# Patient Record
Sex: MALE | Race: BLACK OR AFRICAN AMERICAN | NOT HISPANIC OR LATINO | Employment: FULL TIME | URBAN - NONMETROPOLITAN AREA
[De-identification: names, ages, dates, MRNs, and addresses within clinical notes are randomized per-mention and may not be internally consistent; named-entity substitution may affect disease eponyms.]

---

## 2018-08-11 ENCOUNTER — APPOINTMENT (OUTPATIENT)
Dept: CT IMAGING | Facility: HOSPITAL | Age: 28
End: 2018-08-11

## 2018-08-11 ENCOUNTER — HOSPITAL ENCOUNTER (EMERGENCY)
Facility: HOSPITAL | Age: 28
Discharge: HOME | End: 2018-08-11
Attending: EMERGENCY MEDICINE | Admitting: EMERGENCY MEDICINE

## 2018-08-11 VITALS
HEART RATE: 57 BPM | SYSTOLIC BLOOD PRESSURE: 133 MMHG | HEIGHT: 70 IN | BODY MASS INDEX: 22.33 KG/M2 | OXYGEN SATURATION: 98 % | RESPIRATION RATE: 16 BRPM | WEIGHT: 156 LBS | TEMPERATURE: 98.6 F | DIASTOLIC BLOOD PRESSURE: 71 MMHG

## 2018-08-11 DIAGNOSIS — R20.2 PARESTHESIA: Primary | ICD-10-CM

## 2018-08-11 LAB
ALBUMIN SERPL-MCNC: 4.6 G/DL (ref 3.5–5)
ALBUMIN/GLOB SERPL: 1.4 G/DL (ref 1.1–2.5)
ALP SERPL-CCNC: 50 U/L (ref 24–120)
ALT SERPL W P-5'-P-CCNC: 23 U/L (ref 0–54)
ANION GAP SERPL CALCULATED.3IONS-SCNC: 10 MMOL/L (ref 4–13)
AST SERPL-CCNC: 27 U/L (ref 7–45)
BASOPHILS # BLD AUTO: 0.02 10*3/MM3 (ref 0–0.2)
BASOPHILS NFR BLD AUTO: 0.3 % (ref 0–2)
BILIRUB SERPL-MCNC: 0.5 MG/DL (ref 0.1–1)
BUN BLD-MCNC: 8 MG/DL (ref 5–21)
BUN/CREAT SERPL: 8 (ref 7–25)
CALCIUM SPEC-SCNC: 9.5 MG/DL (ref 8.4–10.4)
CHLORIDE SERPL-SCNC: 104 MMOL/L (ref 98–110)
CO2 SERPL-SCNC: 28 MMOL/L (ref 24–31)
CREAT BLD-MCNC: 1 MG/DL (ref 0.5–1.4)
DEPRECATED RDW RBC AUTO: 37.5 FL (ref 40–54)
EOSINOPHIL # BLD AUTO: 0.05 10*3/MM3 (ref 0–0.7)
EOSINOPHIL NFR BLD AUTO: 0.8 % (ref 0–4)
ERYTHROCYTE [DISTWIDTH] IN BLOOD BY AUTOMATED COUNT: 12.6 % (ref 12–15)
GFR SERPL CREATININE-BSD FRML MDRD: 106 ML/MIN/1.73
GLOBULIN UR ELPH-MCNC: 3.2 GM/DL
GLUCOSE BLD-MCNC: 103 MG/DL (ref 70–100)
HCT VFR BLD AUTO: 41.3 % (ref 40–52)
HGB BLD-MCNC: 13.4 G/DL (ref 14–18)
IMM GRANULOCYTES # BLD: 0.02 10*3/MM3 (ref 0–0.03)
IMM GRANULOCYTES NFR BLD: 0.3 % (ref 0–5)
LYMPHOCYTES # BLD AUTO: 1.14 10*3/MM3 (ref 0.72–4.86)
LYMPHOCYTES NFR BLD AUTO: 18.8 % (ref 15–45)
MAGNESIUM SERPL-MCNC: 2 MG/DL (ref 1.4–2.2)
MCH RBC QN AUTO: 26.7 PG (ref 28–32)
MCHC RBC AUTO-ENTMCNC: 32.4 G/DL (ref 33–36)
MCV RBC AUTO: 82.4 FL (ref 82–95)
MONOCYTES # BLD AUTO: 0.84 10*3/MM3 (ref 0.19–1.3)
MONOCYTES NFR BLD AUTO: 13.9 % (ref 4–12)
NEUTROPHILS # BLD AUTO: 3.98 10*3/MM3 (ref 1.87–8.4)
NEUTROPHILS NFR BLD AUTO: 65.9 % (ref 39–78)
NRBC BLD MANUAL-RTO: 0 /100 WBC (ref 0–0)
PLATELET # BLD AUTO: 254 10*3/MM3 (ref 130–400)
PMV BLD AUTO: 10 FL (ref 6–12)
POTASSIUM BLD-SCNC: 4.1 MMOL/L (ref 3.5–5.3)
PROT SERPL-MCNC: 7.8 G/DL (ref 6.3–8.7)
RBC # BLD AUTO: 5.01 10*6/MM3 (ref 4.8–5.9)
SODIUM BLD-SCNC: 142 MMOL/L (ref 135–145)
WBC NRBC COR # BLD: 6.05 10*3/MM3 (ref 4.8–10.8)

## 2018-08-11 PROCEDURE — 70450 CT HEAD/BRAIN W/O DYE: CPT

## 2018-08-11 PROCEDURE — 85025 COMPLETE CBC W/AUTO DIFF WBC: CPT | Performed by: EMERGENCY MEDICINE

## 2018-08-11 PROCEDURE — 80053 COMPREHEN METABOLIC PANEL: CPT | Performed by: EMERGENCY MEDICINE

## 2018-08-11 PROCEDURE — 83735 ASSAY OF MAGNESIUM: CPT | Performed by: EMERGENCY MEDICINE

## 2018-08-11 PROCEDURE — 72125 CT NECK SPINE W/O DYE: CPT

## 2018-08-11 PROCEDURE — 99283 EMERGENCY DEPT VISIT LOW MDM: CPT

## 2018-08-11 RX ORDER — SODIUM CHLORIDE 0.9 % (FLUSH) 0.9 %
10 SYRINGE (ML) INJECTION AS NEEDED
Status: DISCONTINUED | OUTPATIENT
Start: 2018-08-11 | End: 2018-08-11 | Stop reason: HOSPADM

## 2018-08-11 NOTE — ED PROVIDER NOTES
Subjective   Patient is a 28-year-old male who presents to the ER with neurological changes.  Patient states that approximately one month ago he woke up from sleep and the left side of his body from his left shoulder to his foot felt warm.  Patient states the left side feels different than the right side and he can only describe it as feeling warm and the feeling has persisted.  He says sometimes he will have tingling but denies any tingling at present.  He also denies any weakness, trauma, fever or headache.  Patient never had anything like this previously.  He denies any chest pain, shortness of breath, abdominal pain, nausea vomiting diarrhea, fever, urinary changes.            Review of Systems   Constitutional: Negative.    HENT: Negative.    Eyes: Negative.    Respiratory: Negative.    Cardiovascular: Negative.    Gastrointestinal: Negative.    Endocrine: Negative.    Genitourinary: Negative.    Musculoskeletal: Negative.    Skin: Negative.    Allergic/Immunologic: Negative.    Neurological: Negative.         Abnormal temperature sensation to left side of body   Hematological: Negative.    Psychiatric/Behavioral: Negative.    All other systems reviewed and are negative.      History reviewed. No pertinent past medical history.    Allergies   Allergen Reactions   • Shellfish-Derived Products Anaphylaxis   • Aldomet [Methyldopa] Rash       History reviewed. No pertinent surgical history.    No family history on file.    Social History     Social History   • Marital status: Single     Social History Main Topics   • Drug use: Unknown     Other Topics Concern   • Not on file           Objective   Physical Exam   Constitutional: He is oriented to person, place, and time. He appears well-developed and well-nourished.   HENT:   Head: Normocephalic and atraumatic.   Eyes: Pupils are equal, round, and reactive to light. Conjunctivae are normal.   Neck: Normal range of motion.   Cardiovascular: Normal rate, regular  rhythm and normal heart sounds.    Pulmonary/Chest: Effort normal and breath sounds normal.   Abdominal: Soft. There is no tenderness.   Musculoskeletal: Normal range of motion. He exhibits no edema or deformity.   Neurological: He is alert and oriented to person, place, and time. He has normal strength. No cranial nerve deficit or sensory deficit.   Skin: Skin is warm.   Psychiatric: He has a normal mood and affect. His behavior is normal.   Nursing note and vitals reviewed.      Procedures           ED Course        Lab Results (last 24 hours)     Procedure Component Value Units Date/Time    CBC & Differential [[id number]] Collected:  08/11/18 0942    Specimen:  Blood Updated:  08/11/18 0958    Narrative:       The following orders were created for panel order CBC & Differential.  Procedure                               Abnormality         Status                     ---------                               -----------         ------                     CBC Auto Differential[[id number]]         Abnormal            Final result                 Please view results for these tests on the individual orders.    Comprehensive Metabolic Panel [[id number]]  (Abnormal) Collected:  08/11/18 0942    Specimen:  Blood Updated:  08/11/18 1008     Glucose 103 (H) mg/dL      BUN 8 mg/dL      Creatinine 1.00 mg/dL      Sodium 142 mmol/L      Potassium 4.1 mmol/L      Chloride 104 mmol/L      CO2 28.0 mmol/L      Calcium 9.5 mg/dL      Total Protein 7.8 g/dL      Albumin 4.60 g/dL      ALT (SGPT) 23 U/L      AST (SGOT) 27 U/L      Alkaline Phosphatase 50 U/L      Total Bilirubin 0.5 mg/dL      eGFR   Amer 106 mL/min/1.73      Globulin 3.2 gm/dL      A/G Ratio 1.4 g/dL      BUN/Creatinine Ratio 8.0     Anion Gap 10.0 mmol/L     Magnesium [[id number]]  (Normal) Collected:  08/11/18 0942    Specimen:  Blood Updated:  08/11/18 1008     Magnesium 2.0 mg/dL     CBC Auto Differential [[id number]]  (Abnormal) Collected:  08/11/18 0942     Specimen:  Blood Updated:  08/11/18 0958     WBC 6.05 10*3/mm3      RBC 5.01 10*6/mm3      Hemoglobin 13.4 (L) g/dL      Hematocrit 41.3 %      MCV 82.4 fL      MCH 26.7 (L) pg      MCHC 32.4 (L) g/dL      RDW 12.6 %      RDW-SD 37.5 (L) fl      MPV 10.0 fL      Platelets 254 10*3/mm3      Neutrophil % 65.9 %      Lymphocyte % 18.8 %      Monocyte % 13.9 (H) %      Eosinophil % 0.8 %      Basophil % 0.3 %      Immature Grans % 0.3 %      Neutrophils, Absolute 3.98 10*3/mm3      Lymphocytes, Absolute 1.14 10*3/mm3      Monocytes, Absolute 0.84 10*3/mm3      Eosinophils, Absolute 0.05 10*3/mm3      Basophils, Absolute 0.02 10*3/mm3      Immature Grans, Absolute 0.02 10*3/mm3      nRBC 0.0 /100 WBC         CT Cervical Spine Without Contrast   Final Result   1. No evidence of acute osseous injury or malalignment in the cervical   spine.           This report was finalized on 08/11/2018 10:15 by Dr. Hood Rose MD.      CT Head Without Contrast   Final Result   1. No acute intracranial process.   2. Mucous cyst left sphenoid air cell           This report was finalized on 08/11/2018 10:14 by Dr. Hood Rose MD.        Labs were negative.  CT scan of the head and C-spine were negative.  No cause for temperature difference was found.  He will be discharged home to follow-up with PCP and neurology as soon as possible.   he may need an outpatient MRI if symptoms persist.  Return immediately for any worsening or new symptoms or other concerns.          MDM      Final diagnoses:   ParSury Moraes MD  08/11/18 5570

## 2018-08-11 NOTE — ED NOTES
Patient's mother signs with him;  Reports warmth and tingling on the left side of the body.     Sis Miller RN  08/11/18 1002

## 2018-08-11 NOTE — DISCHARGE INSTRUCTIONS
Follow up with one of the [facility] physician groups below to setup primary care. If you have trouble following up, please call the [facility] Nurse Line at [phone]    (Dr. Francis Ann DO,  Jagruti Gonzalez APRLUIS ARMANDO, and LISBET Khan)  [facility], Primary Care   [address], [address], [place], [place] [address] [phone]     (Dr. Lexus Vazquez MD, LISEBT Harris, and LISBET Barreto)  [facility], Primary Care   [address], [place], [place] [address] [phone]    (Dr. Doc Fontanez MD and Dr. Sterling Melgar MD)  [facility], Primary Care  [address], [address] [phone]    (Dr. Don Hernández MD)  [facility], Primary Care  [address], [address], [place], [place], [address] [phone]

## 2018-10-25 ENCOUNTER — DOCUMENTATION (OUTPATIENT)
Dept: NEUROLOGY | Facility: CLINIC | Age: 28
End: 2018-10-25

## 2018-10-25 ENCOUNTER — OFFICE VISIT (OUTPATIENT)
Dept: NEUROLOGY | Facility: CLINIC | Age: 28
End: 2018-10-25

## 2018-10-25 VITALS
HEART RATE: 80 BPM | WEIGHT: 159 LBS | BODY MASS INDEX: 22.26 KG/M2 | SYSTOLIC BLOOD PRESSURE: 140 MMHG | HEIGHT: 71 IN | RESPIRATION RATE: 18 BRPM | DIASTOLIC BLOOD PRESSURE: 80 MMHG

## 2018-10-25 DIAGNOSIS — R20.2 NUMBNESS AND TINGLING: Primary | ICD-10-CM

## 2018-10-25 DIAGNOSIS — R68.89 SENSATION OF CHANGE IN BODY TEMPERATURE: ICD-10-CM

## 2018-10-25 DIAGNOSIS — R20.0 NUMBNESS AND TINGLING: Primary | ICD-10-CM

## 2018-10-25 DIAGNOSIS — G47.09 OTHER INSOMNIA: ICD-10-CM

## 2018-10-25 DIAGNOSIS — R20.2 NUMBNESS AND TINGLING OF LEFT UPPER EXTREMITY: ICD-10-CM

## 2018-10-25 DIAGNOSIS — R20.0 NUMBNESS AND TINGLING OF LEFT UPPER EXTREMITY: ICD-10-CM

## 2018-10-25 DIAGNOSIS — R00.2 PALPITATIONS: ICD-10-CM

## 2018-10-25 DIAGNOSIS — M54.2 NECK PAIN: ICD-10-CM

## 2018-10-25 PROCEDURE — 99214 OFFICE O/P EST MOD 30 MIN: CPT | Performed by: NURSE PRACTITIONER

## 2018-10-25 NOTE — PROGRESS NOTES
Spoke with patient's God mother in regards to his MRI and having any metal in his body that would interfere with him having the MRI.  The God mother states he does not have any metal nor a Cochlea Implant.  I have notified Berta Oconnor NP.

## 2018-10-25 NOTE — PROGRESS NOTES
Subjective     Chief Complaint   Patient presents with   • Numbness     left side   • Pain       Deangelo Hall is a 28 y.o. male .  He is present today with family.  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 We have accessed our services and were provided with  via [facility] interpretation services. He presents today for HFU for episodes on left sided numbness and paraesthesia involving the left side of body from neck to foot.  These spells are intermittent and have happened twice.  He states that he has not had any episodes since last ER visit on 8/2018 but continues to have pain in his neck area with numbness.  He continues to deny any trauma, recent illness, or weakness. He was also evaluated at a hospital in [place] for the same problem but we do not have access to those records and the patient does not know which hospital it was.    He denies any known PMH other than being premature infant. At times he will feel his heart fluttering and relates that he was told he had a heart problem as an infant.  Patient nor family in the room can determine what kind of heart condition.  He denies any tobacco, alcohol, or illicit drug use.  He recently obtained insurance and has PCP listed on his card but has not established any routine care. Overall, he is improved today. He does relate continued pain to the left shoulder area when lying on it with numbness down the left arm. He is unable to sleep on this side.     He reports that he has difficulty initiating and falling to sleep.  He feels anxious at times.    Neurologic Problem   Primary symptoms comment: numbness parasthesia on left side with pain . This is a new problem. The current episode started more than 1 month ago. The neurological problem developed gradually. The problem has been resolved since onset. Affected Side: left side of body. Past treatments  "include nothing.        No current outpatient prescriptions on file.     No current facility-administered medications for this visit.        Past Medical History:   Diagnosis Date   • Deaf        History reviewed. No pertinent surgical history.    family history is not on file.    Social History   Substance Use Topics   • Smoking status: Never Smoker   • Smokeless tobacco: Never Used   • Alcohol use Yes      Comment: socially       Review of Systems   Constitutional: Negative.    HENT: Positive for hearing loss.    Eyes: Negative.    Respiratory: Negative.    Cardiovascular: Negative.    Gastrointestinal: Negative.    Endocrine: Negative.    Genitourinary: Negative.    Musculoskeletal: Negative.    Skin: Negative.    Allergic/Immunologic: Negative.    Neurological: Positive for numbness.   Hematological: Negative.    Psychiatric/Behavioral: Positive for sleep disturbance.   All other systems reviewed and are negative.      Objective     /80 (BP Location: Left arm, Patient Position: Sitting)   Pulse 80   Resp 18   Ht 180.3 cm (71\")   Wt 72.1 kg (159 lb)   BMI 22.18 kg/m² , Body mass index is 22.18 kg/m².    Physical Exam   Constitutional: He is oriented to person, place, and time. He appears well-developed and well-nourished.   HENT:   Head: Normocephalic and atraumatic.   Right Ear: Decreased hearing is noted.   Left Ear: Decreased hearing is noted.   Eyes: Pupils are equal, round, and reactive to light. EOM are normal.   Neck: Normal range of motion. Neck supple.   Cardiovascular: Normal rate, normal heart sounds and intact distal pulses.    Pulmonary/Chest: Effort normal and breath sounds normal.   Abdominal: Soft.   Musculoskeletal: Normal range of motion.   Neurological: He is alert and oriented to person, place, and time. He has normal strength and normal reflexes. He displays a negative Romberg sign. GCS eye subscore is 4. GCS verbal subscore is 5. GCS motor subscore is 6.   Reflex Scores:       " Tricep reflexes are 2+ on the right side and 2+ on the left side.       Bicep reflexes are 2+ on the right side and 2+ on the left side.       Brachioradialis reflexes are 2+ on the right side and 2+ on the left side.       Patellar reflexes are 2+ on the right side and 2+ on the left side.       Achilles reflexes are 2+ on the right side and 2+ on the left side.  Awake, alert. No aphasia, no dysarthria  Completes simple and complex commands    CN II:  Visual fields full.  Pupils equally reactive to light  CN III, IV, VI:  Extraocular Muscles full with no signs of nystagmus  CN V:  Facial sensory is symmetric with no asymetries.  CN VII:  Facial motor symmetric  CN VIII:  Gross hearing intact bilaterally  CN IX:  Palate elevates symmetrically  CN X:  Palate elevates symmetrically  CN XI:  Shoulder shrug symmetric  CN XII:  Tongue is midline on protrusion    Full and symmetric strength bilateral upper and lower extremities.   Skin: Skin is warm and dry. Capillary refill takes less than 2 seconds.   Psychiatric: He has a normal mood and affect. His speech is normal and behavior is normal. Cognition and memory are normal.   Nursing note and vitals reviewed.        Results for orders placed or performed during the hospital encounter of 08/11/18   Comprehensive Metabolic Panel   Result Value Ref Range    Glucose 103 (H) 70 - 100 mg/dL    BUN 8 5 - 21 mg/dL    Creatinine 1.00 0.50 - 1.40 mg/dL    Sodium 142 135 - 145 mmol/L    Potassium 4.1 3.5 - 5.3 mmol/L    Chloride 104 98 - 110 mmol/L    CO2 28.0 24.0 - 31.0 mmol/L    Calcium 9.5 8.4 - 10.4 mg/dL    Total Protein 7.8 6.3 - 8.7 g/dL    Albumin 4.60 3.50 - 5.00 g/dL    ALT (SGPT) 23 0 - 54 U/L    AST (SGOT) 27 7 - 45 U/L    Alkaline Phosphatase 50 24 - 120 U/L    Total Bilirubin 0.5 0.1 - 1.0 mg/dL    eGFR  African Amer 106 >60 mL/min/1.73    Globulin 3.2 gm/dL    A/G Ratio 1.4 1.1 - 2.5 g/dL    BUN/Creatinine Ratio 8.0 7.0 - 25.0    Anion Gap 10.0 4.0 - 13.0 mmol/L    Magnesium   Result Value Ref Range    Magnesium 2.0 1.4 - 2.2 mg/dL   CBC Auto Differential   Result Value Ref Range    WBC 6.05 4.80 - 10.80 10*3/mm3    RBC 5.01 4.80 - 5.90 10*6/mm3    Hemoglobin 13.4 (L) 14.0 - 18.0 g/dL    Hematocrit 41.3 40.0 - 52.0 %    MCV 82.4 82.0 - 95.0 fL    MCH 26.7 (L) 28.0 - 32.0 pg    MCHC 32.4 (L) 33.0 - 36.0 g/dL    RDW 12.6 12.0 - 15.0 %    RDW-SD 37.5 (L) 40.0 - 54.0 fl    MPV 10.0 6.0 - 12.0 fL    Platelets 254 130 - 400 10*3/mm3    Neutrophil % 65.9 39.0 - 78.0 %    Lymphocyte % 18.8 15.0 - 45.0 %    Monocyte % 13.9 (H) 4.0 - 12.0 %    Eosinophil % 0.8 0.0 - 4.0 %    Basophil % 0.3 0.0 - 2.0 %    Immature Grans % 0.3 0.0 - 5.0 %    Neutrophils, Absolute 3.98 1.87 - 8.40 10*3/mm3    Lymphocytes, Absolute 1.14 0.72 - 4.86 10*3/mm3    Monocytes, Absolute 0.84 0.19 - 1.30 10*3/mm3    Eosinophils, Absolute 0.05 0.00 - 0.70 10*3/mm3    Basophils, Absolute 0.02 0.00 - 0.20 10*3/mm3    Immature Grans, Absolute 0.02 0.00 - 0.03 10*3/mm3    nRBC 0.0 0.0 - 0.0 /100 WBC      CT Head:    ASSESSMENT/PLAN    Diagnoses and all orders for this visit:    Numbness and tingling  -     US segmental limbs upper arterial w stress; Future  -     MRI Brain With & Without Contrast; Future    Neck pain  -     MRI Cervical Spine Without Contrast; Future    Numbness and tingling of left upper extremity  -     US segmental limbs upper arterial w stress; Future  -     MRI Brain With & Without Contrast; Future  -     MRI Cervical Spine Without Contrast; Future  -     Holter Monitor - 24 Hour; Future  -     Adult Transthoracic Echo Complete W/ Cont if Necessary Per Protocol; Future  -     CBC & Differential; Future  -     Comprehensive Metabolic Panel; Future  -     Lipid Panel; Future  -     TSH; Future  -     T4, Free; Future  -     Vitamin B12; Future  -     Folate; Future  -     Magnesium; Future  -     Sedimentation Rate; Future  -     C-reactive Protein; Future  -     Vitamin D 25 Hydroxy; Future  -      EEG (Hospital Performed); Future  -     Methylmalonic Acid, Serum; Future  -     Homocysteine, serum; Future    Sensation of change in body temperature  -     MRI Brain With & Without Contrast; Future  -     MRI Cervical Spine Without Contrast; Future  -     Holter Monitor - 24 Hour; Future  -     Adult Transthoracic Echo Complete W/ Cont if Necessary Per Protocol; Future  -     CBC & Differential; Future  -     Comprehensive Metabolic Panel; Future  -     Lipid Panel; Future  -     TSH; Future  -     T4, Free; Future  -     Vitamin B12; Future  -     Folate; Future  -     Magnesium; Future  -     Sedimentation Rate; Future  -     C-reactive Protein; Future  -     Vitamin D 25 Hydroxy; Future  -     EEG (Hospital Performed); Future  -     Methylmalonic Acid, Serum; Future  -     Homocysteine, serum; Future    Other insomnia    Palpitations  -     Holter Monitor - 24 Hour; Future  -     Adult Transthoracic Echo Complete W/ Cont if Necessary Per Protocol; Future  -     CBC & Differential; Future  -     Comprehensive Metabolic Panel; Future  -     Lipid Panel; Future  -     TSH; Future  -     T4, Free; Future  -     Vitamin B12; Future  -     Folate; Future  -     Magnesium; Future  -     Sedimentation Rate; Future  -     C-reactive Protein; Future  -     Vitamin D 25 Hydroxy; Future  -     EEG (Hospital Performed); Future  -     Methylmalonic Acid, Serum; Future  -     Homocysteine, serum; Future        Allergies and all known medications/prescriptions have been reviewed using resources available on this encounter.    Patient's Body mass index is 22.18 kg/m². BMI is within normal parameters. No follow-up required.    Return in about 4 weeks (around 2018).    MEDICAL DECISION MAKIN. Obtain EEG, unlikely this may be a variant seizure activity but still warrants ruling out.   2. Obtain MRI Brain with and without contrast. Will check with family to ensure no known cochlear implant.  3. Obtain MRI Cervical Spine  without contrast - will check no cochlear implant .  4. Obtain 24 hr Holter Monitor- palpitations with possible history of congenital heart anomaly.  5. Obtain TTE- same as #4  6. Obtain labs as listed above.   7. Counseled on importance of establishing with PCP.   8. Us segmental limb upper arterial with stress to rule possible thoracic outlet syndrome.     WE WILL NEED  PRESENT AT OV!!!!    Berta Oconnor, APRN

## 2018-10-26 ENCOUNTER — LAB (OUTPATIENT)
Dept: LAB | Facility: HOSPITAL | Age: 28
End: 2018-10-26

## 2018-10-26 DIAGNOSIS — R68.89 SENSATION OF CHANGE IN BODY TEMPERATURE: ICD-10-CM

## 2018-10-26 DIAGNOSIS — R20.0 NUMBNESS AND TINGLING OF LEFT UPPER EXTREMITY: ICD-10-CM

## 2018-10-26 DIAGNOSIS — R00.2 PALPITATIONS: ICD-10-CM

## 2018-10-26 DIAGNOSIS — R20.2 NUMBNESS AND TINGLING OF LEFT UPPER EXTREMITY: ICD-10-CM

## 2018-10-26 LAB
25(OH)D3 SERPL-MCNC: 27.5 NG/ML (ref 30–100)
ALBUMIN SERPL-MCNC: 4.7 G/DL (ref 3.5–5)
ALBUMIN/GLOB SERPL: 1.3 G/DL (ref 1.1–2.5)
ALP SERPL-CCNC: 52 U/L (ref 24–120)
ALT SERPL W P-5'-P-CCNC: 26 U/L (ref 0–54)
ANION GAP SERPL CALCULATED.3IONS-SCNC: 12 MMOL/L (ref 4–13)
ARTICHOKE IGE QN: 92 MG/DL (ref 0–99)
AST SERPL-CCNC: 34 U/L (ref 7–45)
BASOPHILS # BLD AUTO: 0.04 10*3/MM3 (ref 0–0.2)
BASOPHILS NFR BLD AUTO: 0.6 % (ref 0–2)
BILIRUB SERPL-MCNC: 0.9 MG/DL (ref 0.1–1)
BUN BLD-MCNC: 9 MG/DL (ref 5–21)
BUN/CREAT SERPL: 8.7 (ref 7–25)
CALCIUM SPEC-SCNC: 9.5 MG/DL (ref 8.4–10.4)
CHLORIDE SERPL-SCNC: 99 MMOL/L (ref 98–110)
CHOLEST SERPL-MCNC: 191 MG/DL (ref 130–200)
CO2 SERPL-SCNC: 29 MMOL/L (ref 24–31)
CREAT BLD-MCNC: 1.04 MG/DL (ref 0.5–1.4)
CRP SERPL-MCNC: <0.5 MG/DL (ref 0–0.99)
DEPRECATED RDW RBC AUTO: 39 FL (ref 40–54)
EOSINOPHIL # BLD AUTO: 0.17 10*3/MM3 (ref 0–0.7)
EOSINOPHIL NFR BLD AUTO: 2.5 % (ref 0–4)
ERYTHROCYTE [DISTWIDTH] IN BLOOD BY AUTOMATED COUNT: 12.7 % (ref 12–15)
ERYTHROCYTE [SEDIMENTATION RATE] IN BLOOD: <1 MM/HR (ref 0–15)
FOLATE SERPL-MCNC: 7.94 NG/ML
GFR SERPL CREATININE-BSD FRML MDRD: 102 ML/MIN/1.73
GLOBULIN UR ELPH-MCNC: 3.6 GM/DL
GLUCOSE BLD-MCNC: 97 MG/DL (ref 70–100)
HCT VFR BLD AUTO: 43.4 % (ref 40–52)
HDLC SERPL-MCNC: 74 MG/DL
HGB BLD-MCNC: 14.2 G/DL (ref 14–18)
IMM GRANULOCYTES # BLD: 0.02 10*3/MM3 (ref 0–0.03)
IMM GRANULOCYTES NFR BLD: 0.3 % (ref 0–5)
LDLC/HDLC SERPL: 1.36 {RATIO}
LYMPHOCYTES # BLD AUTO: 2 10*3/MM3 (ref 0.72–4.86)
LYMPHOCYTES NFR BLD AUTO: 29.7 % (ref 15–45)
MAGNESIUM SERPL-MCNC: 2 MG/DL (ref 1.4–2.2)
MCH RBC QN AUTO: 27.5 PG (ref 28–32)
MCHC RBC AUTO-ENTMCNC: 32.7 G/DL (ref 33–36)
MCV RBC AUTO: 83.9 FL (ref 82–95)
MONOCYTES # BLD AUTO: 0.57 10*3/MM3 (ref 0.19–1.3)
MONOCYTES NFR BLD AUTO: 8.5 % (ref 4–12)
NEUTROPHILS # BLD AUTO: 3.94 10*3/MM3 (ref 1.87–8.4)
NEUTROPHILS NFR BLD AUTO: 58.4 % (ref 39–78)
NRBC BLD MANUAL-RTO: 0 /100 WBC (ref 0–0)
PLATELET # BLD AUTO: 228 10*3/MM3 (ref 130–400)
PMV BLD AUTO: 9.8 FL (ref 6–12)
POTASSIUM BLD-SCNC: 3.6 MMOL/L (ref 3.5–5.3)
PROT SERPL-MCNC: 8.3 G/DL (ref 6.3–8.7)
RBC # BLD AUTO: 5.17 10*6/MM3 (ref 4.8–5.9)
SODIUM BLD-SCNC: 140 MMOL/L (ref 135–145)
T4 FREE SERPL-MCNC: 0.98 NG/DL (ref 0.78–2.19)
TRIGL SERPL-MCNC: 80 MG/DL (ref 0–149)
TSH SERPL DL<=0.05 MIU/L-ACNC: 2.06 MIU/ML (ref 0.47–4.68)
VIT B12 BLD-MCNC: 753 PG/ML (ref 239–931)
WBC NRBC COR # BLD: 6.74 10*3/MM3 (ref 4.8–10.8)

## 2018-10-26 PROCEDURE — 85651 RBC SED RATE NONAUTOMATED: CPT | Performed by: NURSE PRACTITIONER

## 2018-10-26 PROCEDURE — 82306 VITAMIN D 25 HYDROXY: CPT | Performed by: NURSE PRACTITIONER

## 2018-10-26 PROCEDURE — 82607 VITAMIN B-12: CPT | Performed by: NURSE PRACTITIONER

## 2018-10-26 PROCEDURE — 80061 LIPID PANEL: CPT | Performed by: NURSE PRACTITIONER

## 2018-10-26 PROCEDURE — 36415 COLL VENOUS BLD VENIPUNCTURE: CPT

## 2018-10-26 PROCEDURE — 83921 ORGANIC ACID SINGLE QUANT: CPT | Performed by: NURSE PRACTITIONER

## 2018-10-26 PROCEDURE — 83735 ASSAY OF MAGNESIUM: CPT | Performed by: NURSE PRACTITIONER

## 2018-10-26 PROCEDURE — 85025 COMPLETE CBC W/AUTO DIFF WBC: CPT | Performed by: NURSE PRACTITIONER

## 2018-10-26 PROCEDURE — 86140 C-REACTIVE PROTEIN: CPT | Performed by: NURSE PRACTITIONER

## 2018-10-26 PROCEDURE — 80053 COMPREHEN METABOLIC PANEL: CPT | Performed by: NURSE PRACTITIONER

## 2018-10-26 PROCEDURE — 84443 ASSAY THYROID STIM HORMONE: CPT | Performed by: NURSE PRACTITIONER

## 2018-10-26 PROCEDURE — 84439 ASSAY OF FREE THYROXINE: CPT | Performed by: NURSE PRACTITIONER

## 2018-10-26 PROCEDURE — 83090 ASSAY OF HOMOCYSTEINE: CPT | Performed by: NURSE PRACTITIONER

## 2018-10-26 PROCEDURE — 82746 ASSAY OF FOLIC ACID SERUM: CPT | Performed by: NURSE PRACTITIONER

## 2018-10-26 RX ORDER — ERGOCALCIFEROL 1.25 MG/1
50000 CAPSULE ORAL WEEKLY
Qty: 4 CAPSULE | Refills: 1 | Status: SHIPPED | OUTPATIENT
Start: 2018-10-26 | End: 2018-11-18

## 2018-10-27 LAB — HCYS SERPL-SCNC: 9.3 UMOL/L (ref 0–15)

## 2018-10-29 ENCOUNTER — DOCUMENTATION (OUTPATIENT)
Dept: NEUROLOGY | Facility: CLINIC | Age: 28
End: 2018-10-29

## 2018-10-29 NOTE — PROGRESS NOTES
Spoke with patient's God mother about the results of his blood work.  I did let her know the patient will need to see his primary care doctor about his anemia.  I did let her know the patient has a low Vitamin D level and a prescription has been sent to his pharmacy for Vitamin D 50,000 units 1 tab weekly for 8 weeks.  The patient's God mother voices understanding and will let the patient know.

## 2018-10-30 LAB
Lab: NORMAL
METHYLMALONATE SERPL-SCNC: 154 NMOL/L (ref 0–378)

## 2018-11-01 ENCOUNTER — HOSPITAL ENCOUNTER (OUTPATIENT)
Dept: ULTRASOUND IMAGING | Facility: HOSPITAL | Age: 28
Discharge: HOME | End: 2018-11-01

## 2018-11-01 ENCOUNTER — HOSPITAL ENCOUNTER (OUTPATIENT)
Dept: CARDIOLOGY | Facility: HOSPITAL | Age: 28
Discharge: HOME | End: 2018-11-01

## 2018-11-01 ENCOUNTER — HOSPITAL ENCOUNTER (OUTPATIENT)
Dept: MRI IMAGING | Facility: HOSPITAL | Age: 28
Discharge: HOME | End: 2018-11-01

## 2018-11-01 ENCOUNTER — PREP FOR SURGERY (OUTPATIENT)
Dept: OTHER | Facility: HOSPITAL | Age: 28
End: 2018-11-01

## 2018-11-01 ENCOUNTER — HOSPITAL ENCOUNTER (OUTPATIENT)
Dept: MRI IMAGING | Facility: HOSPITAL | Age: 28
Discharge: HOME | End: 2018-11-01
Admitting: NURSE PRACTITIONER

## 2018-11-01 VITALS
SYSTOLIC BLOOD PRESSURE: 138 MMHG | DIASTOLIC BLOOD PRESSURE: 87 MMHG | BODY MASS INDEX: 22.25 KG/M2 | WEIGHT: 158.95 LBS | HEIGHT: 71 IN

## 2018-11-01 DIAGNOSIS — R68.89 SENSATION OF CHANGE IN BODY TEMPERATURE: ICD-10-CM

## 2018-11-01 DIAGNOSIS — R00.2 PALPITATIONS: ICD-10-CM

## 2018-11-01 DIAGNOSIS — G35 MULTIPLE SCLEROSIS (HCC): Primary | ICD-10-CM

## 2018-11-01 DIAGNOSIS — R20.0 NUMBNESS AND TINGLING OF LEFT UPPER EXTREMITY: ICD-10-CM

## 2018-11-01 DIAGNOSIS — R20.2 NUMBNESS AND TINGLING: ICD-10-CM

## 2018-11-01 DIAGNOSIS — M54.2 NECK PAIN: ICD-10-CM

## 2018-11-01 DIAGNOSIS — R20.2 NUMBNESS AND TINGLING OF LEFT UPPER EXTREMITY: ICD-10-CM

## 2018-11-01 DIAGNOSIS — R20.0 NUMBNESS AND TINGLING: ICD-10-CM

## 2018-11-01 PROCEDURE — 70553 MRI BRAIN STEM W/O & W/DYE: CPT

## 2018-11-01 PROCEDURE — 93226 XTRNL ECG REC<48 HR SCAN A/R: CPT

## 2018-11-01 PROCEDURE — 93306 TTE W/DOPPLER COMPLETE: CPT

## 2018-11-01 PROCEDURE — 93225 XTRNL ECG REC<48 HRS REC: CPT

## 2018-11-01 PROCEDURE — 93306 TTE W/DOPPLER COMPLETE: CPT | Performed by: INTERNAL MEDICINE

## 2018-11-01 PROCEDURE — 0 GADOBENATE DIMEGLUMINE 529 MG/ML SOLUTION: Performed by: NURSE PRACTITIONER

## 2018-11-01 PROCEDURE — 93923 UPR/LXTR ART STDY 3+ LVLS: CPT

## 2018-11-01 PROCEDURE — 72141 MRI NECK SPINE W/O DYE: CPT

## 2018-11-01 PROCEDURE — 93923 UPR/LXTR ART STDY 3+ LVLS: CPT | Performed by: SURGERY

## 2018-11-01 PROCEDURE — A9577 INJ MULTIHANCE: HCPCS | Performed by: NURSE PRACTITIONER

## 2018-11-01 RX ADMIN — GADOBENATE DIMEGLUMINE 10 ML: 529 INJECTION, SOLUTION INTRAVENOUS at 08:19

## 2018-11-05 LAB
BH CV ECHO MEAS - AO MAX PG (FULL): 2 MMHG
BH CV ECHO MEAS - AO MAX PG: 5.5 MMHG
BH CV ECHO MEAS - AO MEAN PG (FULL): 1 MMHG
BH CV ECHO MEAS - AO MEAN PG: 3 MMHG
BH CV ECHO MEAS - AO ROOT AREA (BSA CORRECTED): 1.6
BH CV ECHO MEAS - AO ROOT AREA: 7.1 CM^2
BH CV ECHO MEAS - AO ROOT DIAM: 3 CM
BH CV ECHO MEAS - AO V2 MAX: 117 CM/SEC
BH CV ECHO MEAS - AO V2 MEAN: 86.1 CM/SEC
BH CV ECHO MEAS - AO V2 VTI: 28.4 CM
BH CV ECHO MEAS - AVA(I,A): 2.9 CM^2
BH CV ECHO MEAS - AVA(I,D): 2.9 CM^2
BH CV ECHO MEAS - AVA(V,A): 3 CM^2
BH CV ECHO MEAS - AVA(V,D): 3 CM^2
BH CV ECHO MEAS - BSA(HAYCOCK): 1.9 M^2
BH CV ECHO MEAS - BSA: 1.9 M^2
BH CV ECHO MEAS - BZI_BMI: 22.2 KILOGRAMS/M^2
BH CV ECHO MEAS - BZI_METRIC_HEIGHT: 180.3 CM
BH CV ECHO MEAS - BZI_METRIC_WEIGHT: 72.1 KG
BH CV ECHO MEAS - EDV(CUBED): 93 ML
BH CV ECHO MEAS - EDV(MOD-SP4): 67.8 ML
BH CV ECHO MEAS - EDV(TEICH): 93.9 ML
BH CV ECHO MEAS - EF(CUBED): 87.3 %
BH CV ECHO MEAS - EF(MOD-SP4): 59.3 %
BH CV ECHO MEAS - EF(TEICH): 81.1 %
BH CV ECHO MEAS - ESV(CUBED): 11.9 ML
BH CV ECHO MEAS - ESV(MOD-SP4): 27.6 ML
BH CV ECHO MEAS - ESV(TEICH): 17.7 ML
BH CV ECHO MEAS - FS: 49.7 %
BH CV ECHO MEAS - IVS/LVPW: 1
BH CV ECHO MEAS - IVSD: 0.94 CM
BH CV ECHO MEAS - LA DIMENSION: 2.6 CM
BH CV ECHO MEAS - LA/AO: 0.87
BH CV ECHO MEAS - LAT PEAK E' VEL: 14.3 CM/SEC
BH CV ECHO MEAS - LV DIASTOLIC VOL/BSA (35-75): 35.4 ML/M^2
BH CV ECHO MEAS - LV MASS(C)D: 141.4 GRAMS
BH CV ECHO MEAS - LV MASS(C)DI: 73.9 GRAMS/M^2
BH CV ECHO MEAS - LV MAX PG: 3.5 MMHG
BH CV ECHO MEAS - LV MEAN PG: 2 MMHG
BH CV ECHO MEAS - LV SYSTOLIC VOL/BSA (12-30): 14.4 ML/M^2
BH CV ECHO MEAS - LV V1 MAX: 93.1 CM/SEC
BH CV ECHO MEAS - LV V1 MEAN: 61.9 CM/SEC
BH CV ECHO MEAS - LV V1 VTI: 21.4 CM
BH CV ECHO MEAS - LVIDD: 4.5 CM
BH CV ECHO MEAS - LVIDS: 2.3 CM
BH CV ECHO MEAS - LVLD AP4: 7.7 CM
BH CV ECHO MEAS - LVLS AP4: 6.6 CM
BH CV ECHO MEAS - LVOT AREA (M): 3.8 CM^2
BH CV ECHO MEAS - LVOT AREA: 3.8 CM^2
BH CV ECHO MEAS - LVOT DIAM: 2.2 CM
BH CV ECHO MEAS - LVPWD: 0.93 CM
BH CV ECHO MEAS - MED PEAK E' VEL: 13.8 CM/SEC
BH CV ECHO MEAS - MV A MAX VEL: 48 CM/SEC
BH CV ECHO MEAS - MV DEC TIME: 0.14 SEC
BH CV ECHO MEAS - MV E MAX VEL: 105 CM/SEC
BH CV ECHO MEAS - MV E/A: 2.2
BH CV ECHO MEAS - RAP SYSTOLE: 5 MMHG
BH CV ECHO MEAS - RVSP: 23.3 MMHG
BH CV ECHO MEAS - SI(AO): 104.9 ML/M^2
BH CV ECHO MEAS - SI(CUBED): 42.4 ML/M^2
BH CV ECHO MEAS - SI(LVOT): 42.5 ML/M^2
BH CV ECHO MEAS - SI(MOD-SP4): 21 ML/M^2
BH CV ECHO MEAS - SI(TEICH): 39.8 ML/M^2
BH CV ECHO MEAS - SV(AO): 200.7 ML
BH CV ECHO MEAS - SV(CUBED): 81.1 ML
BH CV ECHO MEAS - SV(LVOT): 81.3 ML
BH CV ECHO MEAS - SV(MOD-SP4): 40.2 ML
BH CV ECHO MEAS - SV(TEICH): 76.2 ML
BH CV ECHO MEAS - TR MAX VEL: 214 CM/SEC
BH CV ECHO MEASUREMENTS AVERAGE E/E' RATIO: 7.47
LEFT ATRIUM VOLUME INDEX: 33.2 ML/M2
LEFT ATRIUM VOLUME: 63.4 CM3

## 2018-11-05 PROCEDURE — 93227 XTRNL ECG REC<48 HR R&I: CPT | Performed by: INTERNAL MEDICINE

## 2018-11-14 ENCOUNTER — HOSPITAL ENCOUNTER (OUTPATIENT)
Dept: NEUROLOGY | Facility: HOSPITAL | Age: 28
Discharge: HOME | End: 2018-11-14
Admitting: NURSE PRACTITIONER

## 2018-11-14 ENCOUNTER — HOSPITAL ENCOUNTER (OUTPATIENT)
Dept: MRI IMAGING | Facility: HOSPITAL | Age: 28
Discharge: HOME | End: 2018-11-14

## 2018-11-14 DIAGNOSIS — R68.89 SENSATION OF CHANGE IN BODY TEMPERATURE: ICD-10-CM

## 2018-11-14 DIAGNOSIS — G35 MULTIPLE SCLEROSIS (HCC): ICD-10-CM

## 2018-11-14 DIAGNOSIS — R00.2 PALPITATIONS: ICD-10-CM

## 2018-11-14 DIAGNOSIS — R20.0 NUMBNESS AND TINGLING OF LEFT UPPER EXTREMITY: ICD-10-CM

## 2018-11-14 DIAGNOSIS — R20.2 NUMBNESS AND TINGLING OF LEFT UPPER EXTREMITY: ICD-10-CM

## 2018-11-14 PROCEDURE — A9577 INJ MULTIHANCE: HCPCS | Performed by: NURSE PRACTITIONER

## 2018-11-14 PROCEDURE — 72157 MRI CHEST SPINE W/O & W/DYE: CPT

## 2018-11-14 PROCEDURE — 95816 EEG AWAKE AND DROWSY: CPT

## 2018-11-14 PROCEDURE — 95816 EEG AWAKE AND DROWSY: CPT | Performed by: PSYCHIATRY & NEUROLOGY

## 2018-11-14 PROCEDURE — 0 GADOBENATE DIMEGLUMINE 529 MG/ML SOLUTION: Performed by: NURSE PRACTITIONER

## 2018-11-14 RX ADMIN — GADOBENATE DIMEGLUMINE 10 ML: 529 INJECTION, SOLUTION INTRAVENOUS at 09:08

## 2018-11-16 ENCOUNTER — LAB (OUTPATIENT)
Dept: LAB | Facility: HOSPITAL | Age: 28
End: 2018-11-16

## 2018-11-16 ENCOUNTER — APPOINTMENT (OUTPATIENT)
Dept: MRI IMAGING | Facility: HOSPITAL | Age: 28
End: 2018-11-16

## 2018-11-16 ENCOUNTER — HOSPITAL ENCOUNTER (OUTPATIENT)
Dept: GENERAL RADIOLOGY | Facility: HOSPITAL | Age: 28
Discharge: HOME | End: 2018-11-16
Admitting: NURSE PRACTITIONER

## 2018-11-16 ENCOUNTER — NURSE TRIAGE (OUTPATIENT)
Dept: CALL CENTER | Facility: HOSPITAL | Age: 28
End: 2018-11-16

## 2018-11-16 VITALS
RESPIRATION RATE: 18 BRPM | SYSTOLIC BLOOD PRESSURE: 128 MMHG | HEART RATE: 56 BPM | TEMPERATURE: 98.6 F | OXYGEN SATURATION: 95 % | WEIGHT: 152 LBS | BODY MASS INDEX: 21.76 KG/M2 | DIASTOLIC BLOOD PRESSURE: 91 MMHG | HEIGHT: 70 IN

## 2018-11-16 DIAGNOSIS — G35 MULTIPLE SCLEROSIS (HCC): ICD-10-CM

## 2018-11-16 LAB
APPEARANCE CSF: CLEAR
APPEARANCE CSF: CLEAR
APTT PPP: 34.5 SECONDS (ref 24.1–34.8)
COLOR CSF: COLORLESS
COLOR CSF: COLORLESS
COLOR SPUN CSF: COLORLESS
COLOR SPUN CSF: COLORLESS
GLUCOSE CSF-MCNC: 56 MG/DL (ref 40–70)
INR PPP: 1.04 (ref 0.91–1.09)
METHOD: ABNORMAL
METHOD: ABNORMAL
NUC CELL # CSF MANUAL: 1 /MM3 (ref 0–8)
NUC CELL # CSF MANUAL: 5 /MM3 (ref 0–8)
PLATELET # BLD AUTO: 225 10*3/MM3 (ref 130–400)
PROT CSF-MCNC: 44 MG/DL (ref 12–60)
PROTHROMBIN TIME: 13.9 SECONDS (ref 11.9–14.6)
RBC # CSF MANUAL: 130 /MM3 (ref 0–0)
RBC # CSF MANUAL: 16 /MM3 (ref 0–0)
SPECIMEN VOL CSF: 13.5 ML
SPECIMEN VOL CSF: 13.5 ML
TUBE # CSF: 1
TUBE # CSF: 4

## 2018-11-16 PROCEDURE — 82945 GLUCOSE OTHER FLUID: CPT | Performed by: NURSE PRACTITIONER

## 2018-11-16 PROCEDURE — 87015 SPECIMEN INFECT AGNT CONCNTJ: CPT | Performed by: NURSE PRACTITIONER

## 2018-11-16 PROCEDURE — 84157 ASSAY OF PROTEIN OTHER: CPT | Performed by: NURSE PRACTITIONER

## 2018-11-16 PROCEDURE — 86617 LYME DISEASE ANTIBODY: CPT | Performed by: NURSE PRACTITIONER

## 2018-11-16 PROCEDURE — 86618 LYME DISEASE ANTIBODY: CPT | Performed by: NURSE PRACTITIONER

## 2018-11-16 PROCEDURE — 89050 BODY FLUID CELL COUNT: CPT | Performed by: NURSE PRACTITIONER

## 2018-11-16 PROCEDURE — 86778 TOXOPLASMA ANTIBODY IGM: CPT | Performed by: NURSE PRACTITIONER

## 2018-11-16 PROCEDURE — 86787 VARICELLA-ZOSTER ANTIBODY: CPT | Performed by: NURSE PRACTITIONER

## 2018-11-16 PROCEDURE — 86777 TOXOPLASMA ANTIBODY: CPT | Performed by: NURSE PRACTITIONER

## 2018-11-16 PROCEDURE — 85049 AUTOMATED PLATELET COUNT: CPT | Performed by: RADIOLOGY

## 2018-11-16 PROCEDURE — 87498 ENTEROVIRUS PROBE&REVRS TRNS: CPT | Performed by: NURSE PRACTITIONER

## 2018-11-16 PROCEDURE — 36415 COLL VENOUS BLD VENIPUNCTURE: CPT

## 2018-11-16 PROCEDURE — 85610 PROTHROMBIN TIME: CPT | Performed by: RADIOLOGY

## 2018-11-16 PROCEDURE — 82784 ASSAY IGA/IGD/IGG/IGM EACH: CPT | Performed by: NURSE PRACTITIONER

## 2018-11-16 PROCEDURE — 83916 OLIGOCLONAL BANDS: CPT | Performed by: NURSE PRACTITIONER

## 2018-11-16 PROCEDURE — 87070 CULTURE OTHR SPECIMN AEROBIC: CPT | Performed by: NURSE PRACTITIONER

## 2018-11-16 PROCEDURE — 87496 CYTOMEG DNA AMP PROBE: CPT | Performed by: NURSE PRACTITIONER

## 2018-11-16 PROCEDURE — 82042 OTHER SOURCE ALBUMIN QUAN EA: CPT | Performed by: NURSE PRACTITIONER

## 2018-11-16 PROCEDURE — 87798 DETECT AGENT NOS DNA AMP: CPT | Performed by: NURSE PRACTITIONER

## 2018-11-16 PROCEDURE — 77003 FLUOROGUIDE FOR SPINE INJECT: CPT

## 2018-11-16 PROCEDURE — 85730 THROMBOPLASTIN TIME PARTIAL: CPT | Performed by: RADIOLOGY

## 2018-11-16 PROCEDURE — 87205 SMEAR GRAM STAIN: CPT | Performed by: NURSE PRACTITIONER

## 2018-11-16 PROCEDURE — 82040 ASSAY OF SERUM ALBUMIN: CPT | Performed by: NURSE PRACTITIONER

## 2018-11-16 PROCEDURE — 86592 SYPHILIS TEST NON-TREP QUAL: CPT | Performed by: NURSE PRACTITIONER

## 2018-11-16 PROCEDURE — 83873 ASSAY OF CSF PROTEIN: CPT | Performed by: NURSE PRACTITIONER

## 2018-11-16 RX ORDER — LIDOCAINE HYDROCHLORIDE 10 MG/ML
5 INJECTION, SOLUTION EPIDURAL; INFILTRATION; INTRACAUDAL; PERINEURAL ONCE
Status: DISCONTINUED | OUTPATIENT
Start: 2018-11-16 | End: 2018-11-17 | Stop reason: HOSPADM

## 2018-11-17 NOTE — TELEPHONE ENCOUNTER
"States Deangelo had a lumbar puncture today at [facility]. States they had to stick him twice. States he has some burning and pain, with a little numbness earlier down one leg. States no numbness currently. Asking what to do? Explained ok to take tylenol and can use ice pack 15-20 minutes every few hours but put something between back and ice. Discussed signs/symptoms that require attention including fever, numbness, tingling, pain, incontinence, etc. Verbalized understanding.    Reason for Disposition  • Nursing judgment or information in reference    Additional Information  • Negative: Nursing judgment, per information in Reference  • Negative: Information only call about a Well Adult (no illness or injury)  • Negative: Nursing judgment or information in reference  • Negative: Nursing judgment or information in reference  • Negative: Nursing judgment or information in reference  • Negative: Nursing judgment or information in reference  • Negative: Nursing judgment or information in reference  • Negative: Nursing judgment or information in reference  • Negative: Nursing judgment or information in reference  • Negative: Nursing judgment or information in reference  • Negative: Nursing judgment or information in reference  • Negative: Nursing judgment or information in reference  • Negative: Nursing judgment or information in reference  • Negative: Nursing judgment or information in reference  • Negative: Nursing judgment or information in reference    Answer Assessment - Initial Assessment Questions  1. REASON FOR CALL: \"What is your main concern right now?\"      Burning, down leg post LP today  2. ONSET: \"When did the ___ start?\"      tonight  3. SEVERITY: \"How bad is the ___?\"      moderate  4. FEVER: \"Do you have a fever?\"      no  5. OTHER SYMPTOMS: \"Do you have any other new symptoms?\"      no  6. INTERVENTIONS AND RESPONSE: \"What have you done so far to try to make this better? What medications have you used?\"     " " nothing  7. PREGNANCY: \"Is there any chance you are pregnant?\"      n/a    Protocols used: NO GUIDELINE AVAILABLE-ADULT-AH      "

## 2018-11-18 ENCOUNTER — APPOINTMENT (OUTPATIENT)
Dept: MRI IMAGING | Facility: HOSPITAL | Age: 28
End: 2018-11-18

## 2018-11-18 ENCOUNTER — HOSPITAL ENCOUNTER (EMERGENCY)
Facility: HOSPITAL | Age: 28
Discharge: HOME | End: 2018-11-18
Admitting: EMERGENCY MEDICINE

## 2018-11-18 VITALS
HEART RATE: 49 BPM | RESPIRATION RATE: 18 BRPM | WEIGHT: 158.6 LBS | TEMPERATURE: 99.7 F | BODY MASS INDEX: 22.71 KG/M2 | HEIGHT: 70 IN | SYSTOLIC BLOOD PRESSURE: 139 MMHG | OXYGEN SATURATION: 97 % | DIASTOLIC BLOOD PRESSURE: 89 MMHG

## 2018-11-18 DIAGNOSIS — M54.16 LUMBAR RADICULOPATHY: Primary | ICD-10-CM

## 2018-11-18 DIAGNOSIS — G97.1: ICD-10-CM

## 2018-11-18 LAB
ALBUMIN SERPL-MCNC: 4.6 G/DL (ref 3.5–5)
ALBUMIN/GLOB SERPL: 1.4 G/DL (ref 1.1–2.5)
ALP SERPL-CCNC: 51 U/L (ref 24–120)
ALT SERPL W P-5'-P-CCNC: 29 U/L (ref 0–54)
ANION GAP SERPL CALCULATED.3IONS-SCNC: 10 MMOL/L (ref 4–13)
AST SERPL-CCNC: 38 U/L (ref 7–45)
BASOPHILS # BLD AUTO: 0.03 10*3/MM3 (ref 0–0.2)
BASOPHILS NFR BLD AUTO: 0.7 % (ref 0–2)
BILIRUB SERPL-MCNC: 0.9 MG/DL (ref 0.1–1)
BILIRUB UR QL STRIP: NEGATIVE
BUN BLD-MCNC: 11 MG/DL (ref 5–21)
BUN/CREAT SERPL: 11.8 (ref 7–25)
CALCIUM SPEC-SCNC: 9.6 MG/DL (ref 8.4–10.4)
CHLORIDE SERPL-SCNC: 102 MMOL/L (ref 98–110)
CLARITY UR: ABNORMAL
CO2 SERPL-SCNC: 31 MMOL/L (ref 24–31)
COLOR UR: YELLOW
CREAT BLD-MCNC: 0.93 MG/DL (ref 0.5–1.4)
DEPRECATED RDW RBC AUTO: 37.1 FL (ref 40–54)
EOSINOPHIL # BLD AUTO: 0.08 10*3/MM3 (ref 0–0.7)
EOSINOPHIL NFR BLD AUTO: 1.8 % (ref 0–4)
ERYTHROCYTE [DISTWIDTH] IN BLOOD BY AUTOMATED COUNT: 12.2 % (ref 12–15)
ERYTHROCYTE [SEDIMENTATION RATE] IN BLOOD: 4 MM/HR (ref 0–15)
GFR SERPL CREATININE-BSD FRML MDRD: 116 ML/MIN/1.73
GLOBULIN UR ELPH-MCNC: 3.4 GM/DL
GLUCOSE BLD-MCNC: 99 MG/DL (ref 70–100)
GLUCOSE UR STRIP-MCNC: NEGATIVE MG/DL
HCT VFR BLD AUTO: 42.9 % (ref 40–52)
HGB BLD-MCNC: 14.3 G/DL (ref 14–18)
HGB UR QL STRIP.AUTO: NEGATIVE
HOLD SPECIMEN: NORMAL
HOLD SPECIMEN: NORMAL
IMM GRANULOCYTES # BLD: 0.01 10*3/MM3 (ref 0–0.03)
IMM GRANULOCYTES NFR BLD: 0.2 % (ref 0–5)
INR PPP: 1.03 (ref 0.91–1.09)
KETONES UR QL STRIP: NEGATIVE
LEUKOCYTE ESTERASE UR QL STRIP.AUTO: NEGATIVE
LYMPHOCYTES # BLD AUTO: 1.7 10*3/MM3 (ref 0.72–4.86)
LYMPHOCYTES NFR BLD AUTO: 37.3 % (ref 15–45)
MCH RBC QN AUTO: 27.8 PG (ref 28–32)
MCHC RBC AUTO-ENTMCNC: 33.3 G/DL (ref 33–36)
MCV RBC AUTO: 83.5 FL (ref 82–95)
MONOCYTES # BLD AUTO: 0.44 10*3/MM3 (ref 0.19–1.3)
MONOCYTES NFR BLD AUTO: 9.6 % (ref 4–12)
NEUTROPHILS # BLD AUTO: 2.3 10*3/MM3 (ref 1.87–8.4)
NEUTROPHILS NFR BLD AUTO: 50.4 % (ref 39–78)
NITRITE UR QL STRIP: NEGATIVE
NRBC BLD MANUAL-RTO: 0 /100 WBC (ref 0–0)
PH UR STRIP.AUTO: 8.5 [PH] (ref 5–8)
PLATELET # BLD AUTO: 225 10*3/MM3 (ref 130–400)
PMV BLD AUTO: 10 FL (ref 6–12)
POTASSIUM BLD-SCNC: 4.1 MMOL/L (ref 3.5–5.3)
PROT SERPL-MCNC: 8 G/DL (ref 6.3–8.7)
PROT UR QL STRIP: NEGATIVE
PROTHROMBIN TIME: 13.8 SECONDS (ref 11.9–14.6)
RBC # BLD AUTO: 5.14 10*6/MM3 (ref 4.8–5.9)
SODIUM BLD-SCNC: 143 MMOL/L (ref 135–145)
SP GR UR STRIP: 1.02 (ref 1–1.03)
UROBILINOGEN UR QL STRIP: ABNORMAL
WBC NRBC COR # BLD: 4.56 10*3/MM3 (ref 4.8–10.8)
WHOLE BLOOD HOLD SPECIMEN: NORMAL
WHOLE BLOOD HOLD SPECIMEN: NORMAL

## 2018-11-18 PROCEDURE — 85651 RBC SED RATE NONAUTOMATED: CPT | Performed by: NURSE PRACTITIONER

## 2018-11-18 PROCEDURE — 99283 EMERGENCY DEPT VISIT LOW MDM: CPT

## 2018-11-18 PROCEDURE — 0 GADOBENATE DIMEGLUMINE 529 MG/ML SOLUTION: Performed by: NURSE PRACTITIONER

## 2018-11-18 PROCEDURE — 80053 COMPREHEN METABOLIC PANEL: CPT | Performed by: NURSE PRACTITIONER

## 2018-11-18 PROCEDURE — 85610 PROTHROMBIN TIME: CPT | Performed by: NURSE PRACTITIONER

## 2018-11-18 PROCEDURE — 85025 COMPLETE CBC W/AUTO DIFF WBC: CPT | Performed by: NURSE PRACTITIONER

## 2018-11-18 PROCEDURE — 72158 MRI LUMBAR SPINE W/O & W/DYE: CPT

## 2018-11-18 PROCEDURE — A9577 INJ MULTIHANCE: HCPCS | Performed by: NURSE PRACTITIONER

## 2018-11-18 PROCEDURE — 81003 URINALYSIS AUTO W/O SCOPE: CPT | Performed by: NURSE PRACTITIONER

## 2018-11-18 RX ORDER — ERGOCALCIFEROL 1.25 MG/1
50000 CAPSULE ORAL WEEKLY
Qty: 4 CAPSULE | Refills: 0 | Status: SHIPPED | OUTPATIENT
Start: 2018-11-18 | End: 2018-12-03 | Stop reason: SDUPTHER

## 2018-11-18 RX ORDER — CYCLOBENZAPRINE HCL 10 MG
10 TABLET ORAL 3 TIMES DAILY PRN
Qty: 20 TABLET | Refills: 0 | Status: SHIPPED | OUTPATIENT
Start: 2018-11-18 | End: 2018-11-28 | Stop reason: ALTCHOICE

## 2018-11-18 RX ORDER — METHYLPREDNISOLONE 4 MG/1
TABLET ORAL
Qty: 21 TABLET | Refills: 0 | Status: SHIPPED | OUTPATIENT
Start: 2018-11-18 | End: 2018-12-06

## 2018-11-18 RX ORDER — SODIUM CHLORIDE 0.9 % (FLUSH) 0.9 %
10 SYRINGE (ML) INJECTION AS NEEDED
Status: DISCONTINUED | OUTPATIENT
Start: 2018-11-18 | End: 2018-11-18 | Stop reason: HOSPADM

## 2018-11-18 RX ADMIN — GADOBENATE DIMEGLUMINE 10 ML: 529 INJECTION, SOLUTION INTRAVENOUS at 09:24

## 2018-11-18 NOTE — ED PROVIDER NOTES
Subjective   Patient is a 28-year-old black male presents with increasing low back pain and left leg pain.  Patient had lumbar puncture on November 16 for diagnostic testing for multiple sclerosis.  Patient is still awaiting those results.  He states that he started having immediate pain down his left lower leg when the spinal needle was inserted into his back.  He states that he had to be stuck twice.  He states the pain has increased over the last day or so.  He also is having some numbness and tingling to the extremity.  Patient is deaf therefore communication was initiated through the hospital provided .         History provided by:  Patient   used: No        Review of Systems   Constitutional: Negative.    HENT: Negative.    Eyes: Negative.    Respiratory: Negative.    Cardiovascular: Negative.    Gastrointestinal: Negative.    Endocrine: Negative.    Genitourinary: Negative.    Musculoskeletal: Negative.    Skin: Negative.    Allergic/Immunologic: Negative.    Neurological: Negative.         Patient is a 28-year-old black male presents with increasing low back pain and left leg pain.  Patient had lumbar puncture on November 16 for diagnostic testing for multiple sclerosis.  Patient is still awaiting those results.  He states that he started having immediate pain down his left lower leg when the spinal needle was inserted into his back.  He states that he had to be stuck twice.  He states the pain has increased over the last day or so.  He also is having some numbness and tingling to the extremity.  Patient is deaf therefore communication was initiated through the hospital provided .      Hematological: Negative.    Psychiatric/Behavioral: Negative.    All other systems reviewed and are negative.      Past Medical History:   Diagnosis Date   • Deaf        Allergies   Allergen Reactions   • Shellfish-Derived Products Anaphylaxis   • Aldomet [Methyldopa] Rash     Pt denies   "      No past surgical history on file.    No family history on file.    Social History     Socioeconomic History   • Marital status: Single     Spouse name: Not on file   • Number of children: Not on file   • Years of education: Not on file   • Highest education level: Not on file   Tobacco Use   • Smoking status: Never Smoker   • Smokeless tobacco: Never Used   Substance and Sexual Activity   • Alcohol use: Yes     Comment: socially   • Drug use: No   • Sexual activity: Defer       Prior to Admission medications    Medication Sig Start Date End Date Taking? Authorizing Provider   vitamin D (ERGOCALCIFEROL) 92520 units capsule capsule Take 1 capsule by mouth 1 (One) Time Per Week for 4 doses. 10/26/18 11/17/18  Berta Oconnor, APRN       /89   Pulse (!) 49   Temp 99.7 °F (37.6 °C)   Resp 18   Ht 177.8 cm (70\")   Wt 71.9 kg (158 lb 9.6 oz)   SpO2 97%   BMI 22.76 kg/m²     Objective   Physical Exam   Constitutional: He is oriented to person, place, and time. He appears well-developed and well-nourished.   HENT:   Head: Normocephalic and atraumatic.   Eyes: Conjunctivae and EOM are normal. Pupils are equal, round, and reactive to light.   Neck: Normal range of motion. Neck supple.   Cardiovascular: Normal rate, regular rhythm, normal heart sounds and intact distal pulses.   Pulmonary/Chest: Effort normal and breath sounds normal.   Abdominal: Soft. Bowel sounds are normal.   Musculoskeletal: Normal range of motion.   Mild  Tenderness on palpation of lower lumbar spine. No stepoff or laxity. No ecchymosis or soft tissue swelling noted. slr is positive to lle at 10 degrees. Foot push pull strong, equal. Moves all other extremities well. Pedal pulses palp    Neurological: He is alert and oriented to person, place, and time. He has normal reflexes.   Skin: Skin is warm and dry.   Psychiatric: He has a normal mood and affect. His behavior is normal. Judgment and thought content normal.   Nursing note and " vitals reviewed.      Procedures         Lab Results (last 24 hours)     Procedure Component Value Units Date/Time    CBC & Differential [[id number]] Collected:  11/18/18 0822    Specimen:  Blood Updated:  11/18/18 0839    Narrative:       The following orders were created for panel order CBC & Differential.  Procedure                               Abnormality         Status                     ---------                               -----------         ------                     CBC Auto Differential[[id number]]        Abnormal            Final result                 Please view results for these tests on the individual orders.    Comprehensive Metabolic Panel [[id number]] Collected:  11/18/18 0822    Specimen:  Blood Updated:  11/18/18 0844     Glucose 99 mg/dL      BUN 11 mg/dL      Creatinine 0.93 mg/dL      Sodium 143 mmol/L      Potassium 4.1 mmol/L      Chloride 102 mmol/L      CO2 31.0 mmol/L      Calcium 9.6 mg/dL      Total Protein 8.0 g/dL      Albumin 4.60 g/dL      ALT (SGPT) 29 U/L      AST (SGOT) 38 U/L      Alkaline Phosphatase 51 U/L      Total Bilirubin 0.9 mg/dL      eGFR  African Amer 116 mL/min/1.73      Globulin 3.4 gm/dL      A/G Ratio 1.4 g/dL      BUN/Creatinine Ratio 11.8     Anion Gap 10.0 mmol/L     Protime-INR [[id number]]  (Normal) Collected:  11/18/18 0822    Specimen:  Blood Updated:  11/18/18 0853     Protime 13.8 Seconds      INR 1.03    Sedimentation Rate [[id number]]  (Normal) Collected:  11/18/18 0822    Specimen:  Blood Updated:  11/18/18 0900     Sed Rate 4 mm/hr     CBC Auto Differential [[id number]]  (Abnormal) Collected:  11/18/18 0822    Specimen:  Blood Updated:  11/18/18 0839     WBC 4.56 10*3/mm3      RBC 5.14 10*6/mm3      Hemoglobin 14.3 g/dL      Hematocrit 42.9 %      MCV 83.5 fL      MCH 27.8 pg      MCHC 33.3 g/dL      RDW 12.2 %      RDW-SD 37.1 fl      MPV 10.0 fL      Platelets 225 10*3/mm3      Neutrophil % 50.4 %      Lymphocyte % 37.3 %      Monocyte % 9.6 %       Eosinophil % 1.8 %      Basophil % 0.7 %      Immature Grans % 0.2 %      Neutrophils, Absolute 2.30 10*3/mm3      Lymphocytes, Absolute 1.70 10*3/mm3      Monocytes, Absolute 0.44 10*3/mm3      Eosinophils, Absolute 0.08 10*3/mm3      Basophils, Absolute 0.03 10*3/mm3      Immature Grans, Absolute 0.01 10*3/mm3      nRBC 0.0 /100 WBC     Urinalysis With Microscopic If Indicated (No Culture) - Urine, Clean Catch [[id number]]  (Abnormal) Collected:  11/18/18 1016    Specimen:  Urine, Clean Catch Updated:  11/18/18 1025     Color, UA Yellow     Appearance, UA Turbid     pH, UA 8.5     Specific Gravity, UA 1.023     Glucose, UA Negative     Ketones, UA Negative     Bilirubin, UA Negative     Blood, UA Negative     Protein, UA Negative     Leuk Esterase, UA Negative     Nitrite, UA Negative     Urobilinogen, UA 0.2 E.U./dL    Narrative:       Urine microscopic not indicated.          MRI Lumbar Spine With & Without Contrast   Final Result   No acute findings, minimal bulging of the discs at L3-4 and   L4-5. No abnormal contrast enhancement is identified.   This report was finalized on 11/18/2018 09:35 by Dr. Jorge Mike MD.          ED Course  ED Course as of Nov 18 1121   Sun Nov 18, 2018   1042 Reviewed results with pt and pt girlfriend. Used the interpretor to discuss results and pt care plan. Pt and girlfriend are in agreement with care plan and are to follow up with dr burton this week. Will discharge pt home shortly in stable condition with girlfriend. Advised to return if symptoms worsen   [CW]      ED Course User Index  [CW] Tran Ramirez, APRN          MDM  Number of Diagnoses or Management Options  Complication of lumbar puncture: minor  Lumbar radiculopathy: minor     Amount and/or Complexity of Data Reviewed  Clinical lab tests: ordered and reviewed  Tests in the radiology section of CPT®: ordered and reviewed    Patient Progress  Patient progress: stable      Final diagnoses:   Lumbar  radiculopathy   Complication of lumbar puncture          Tran Ramirez, LISBET  11/18/18 1119       Tran Ramirez, LISBET  11/18/18 1122

## 2018-11-18 NOTE — DISCHARGE INSTRUCTIONS
Return to ER if symptoms worsen   Ice to area for 24 hours, then moist heat compresses three times a day for 15-20 min intervals     Back Exercises  If you have pain in your back, do these exercises 2-3 times each day or as told by your doctor. When the pain goes away, do the exercises once each day, but repeat the steps more times for each exercise (do more repetitions). If you do not have pain in your back, do these exercises once each day or as told by your doctor.  Exercises  Single Knee to Chest    Do these steps 3-5 times in a row for each le. Lie on your back on a firm bed or the floor with your legs stretched out.  2. Bring one knee to your chest.  3. Hold your knee to your chest by grabbing your knee or thigh.  4. Pull on your knee until you feel a gentle stretch in your lower back.  5. Keep doing the stretch for 10-30 seconds.  6. Slowly let go of your leg and straighten it.    Pelvic Tilt    Do these steps 5-10 times in a row:  1. Lie on your back on a firm bed or the floor with your legs stretched out.  2. Bend your knees so they point up to the ceiling. Your feet should be flat on the floor.  3. Tighten your lower belly (abdomen) muscles to press your lower back against the floor. This will make your tailbone point up to the ceiling instead of pointing down to your feet or the floor.  4. Stay in this position for 5-10 seconds while you gently tighten your muscles and breathe evenly.    Cat-Cow    Do these steps until your lower back bends more easily:  1. Get on your hands and knees on a firm surface. Keep your hands under your shoulders, and keep your knees under your hips. You may put padding under your knees.  2. Let your head hang down, and make your tailbone point down to the floor so your lower back is round like the back of a cat.  3. Stay in this position for 5 seconds.  4. Slowly lift your head and make your tailbone point up to the ceiling so your back hangs low (sags) like the back of a  cow.  5. Stay in this position for 5 seconds.    Press-Ups    Do these steps 5-10 times in a row:  1. Lie on your belly (face-down) on the floor.  2. Place your hands near your head, about shoulder-width apart.  3. While you keep your back relaxed and keep your hips on the floor, slowly straighten your arms to raise the top half of your body and lift your shoulders. Do not use your back muscles. To make yourself more comfortable, you may change where you place your hands.  4. Stay in this position for 5 seconds.  5. Slowly return to lying flat on the floor.    Bridges    Do these steps 10 times in a row:  1. Lie on your back on a firm surface.  2. Bend your knees so they point up to the ceiling. Your feet should be flat on the floor.  3. Tighten your butt muscles and lift your butt off of the floor until your waist is almost as high as your knees. If you do not feel the muscles working in your butt and the back of your thighs, slide your feet 1-2 inches farther away from your butt.  4. Stay in this position for 3-5 seconds.  5. Slowly lower your butt to the floor, and let your butt muscles relax.    If this exercise is too easy, try doing it with your arms crossed over your chest.  Belly Crunches    Do these steps 5-10 times in a row:  1. Lie on your back on a firm bed or the floor with your legs stretched out.  2. Bend your knees so they point up to the ceiling. Your feet should be flat on the floor.  3. Cross your arms over your chest.  4. Tip your chin a little bit toward your chest but do not bend your neck.  5. Tighten your belly muscles and slowly raise your chest just enough to lift your shoulder blades a tiny bit off of the floor.  6. Slowly lower your chest and your head to the floor.    Back Lifts  Do these steps 5-10 times in a row:  1. Lie on your belly (face-down) with your arms at your sides, and rest your forehead on the floor.  2. Tighten the muscles in your legs and your butt.  3. Slowly lift your  chest off of the floor while you keep your hips on the floor. Keep the back of your head in line with the curve in your back. Look at the floor while you do this.  4. Stay in this position for 3-5 seconds.  5. Slowly lower your chest and your face to the floor.    Contact a doctor if:  · Your back pain gets a lot worse when you do an exercise.  · Your back pain does not lessen 2 hours after you exercise.  If you have any of these problems, stop doing the exercises. Do not do them again unless your doctor says it is okay.  Get help right away if:  · You have sudden, very bad back pain. If this happens, stop doing the exercises. Do not do them again unless your doctor says it is okay.  This information is not intended to replace advice given to you by your health care provider. Make sure you discuss any questions you have with your health care provider.  Document Released: 01/20/2012 Document Revised: 05/25/2017 Document Reviewed: 02/11/2016  [employer] Interactive Patient Education © 2018 [employer]      Radicular Pain  Radicular pain is a type of pain that spreads from your back or neck along a spinal nerve. Spinal nerves are nerves that leave the spinal cord and go to the muscles. Radicular pain occurs when one of these nerves becomes irritated or squeezed (compressed). Radicular pain is sometimes called radiculopathy, radiculitis, or a pinched nerve. When you have this type of pain, you may also have weakness, numbness, or tingling in the area of your body that is supplied by the nerve. The pain may feel sharp and burning.  Spinal nerves leave the spinal cord through openings between the 24 bones (vertebrae) that make up the spine. Radicular pain is often caused by something pushing on a spinal nerve. This pushing may be done by a vertebra or by one of the round cushions between vertebrae (intervertebral disks). This can result from an injury, from wear and tear or aging of a disk, or from the growth of a bone  spur that pushes on the nerve. Radicular pain can occur in various areas depending on which spinal nerve is affected:  · Cervical radicular pain occurs in the neck. You may also feel pain, numbness, weakness, or tingling in the arms.  · Thoracic radicular pain occurs in the mid-spine area. You would feel this pain in the back and chest. This type is rare.  · Lumbar radicular pain occurs in the lower back area. You would feel this pain as low back pain. You may feel pain, numbness, weakness, or tingling in the buttocks or legs. Sciatica is a type of lumbar radicular pain that shoots down the back of the leg.    Radicular pain often goes away when you follow instructions from your health care provider for relieving pain at home.  Follow these instructions at home:  Managing pain  · If directed, apply ice to the affected area:  ? Put ice in a plastic bag.  ? Place a towel between your skin and the bag.  ? Leave the ice on for 20 minutes, 2-3 times a day.  · If directed, apply heat to the affected area as often as told by your health care provider. Use the heat source that your health care provider recommends, such as a moist heat pack or a heating pad.  ? Place a towel between your skin and the heat source.  ? Leave the heat on for 20-30 minutes.  ? Remove the heat if your skin turns bright red. This is especially important if you are unable to feel pain, heat, or cold. You may have a greater risk of getting burned.  Activity    · Do not sit or rest in bed for long periods of time.  · Try to stay as active as possible. Ask your health care provider what type of exercise or activity is best for you.  · Avoid activities that make your pain worse, such as bending and lifting.  · Do not lift anything that is heavier than 10 lb (4.5 kg). Practice using proper technique when lifting items. Proper lifting technique involves bending your knees and rising up.  · Do strength and range-of-motion exercises only as told by your  health care provider.  General instructions  · Take over-the-counter and prescription medicines only as told by your health care provider.  · Pay attention to any changes in your symptoms.  · Keep all follow-up visits as told by your health care provider. This is important.  Contact a health care provider if:  · Your pain and other symptoms get worse.  · Your pain medicine is not helping.  · Your pain has not improved after a few weeks of home care.  · You have a fever.  Get help right away if:  · You have severe pain, weakness, or numbness.  · You have difficulty with bladder or bowel control.  This information is not intended to replace advice given to you by your health care provider. Make sure you discuss any questions you have with your health care provider.  Document Released: 01/25/2006 Document Revised: 05/25/2017 Document Reviewed: 07/13/2016  [employer] Interactive Patient Education © 2018 [employer]

## 2018-11-19 ENCOUNTER — APPOINTMENT (OUTPATIENT)
Dept: MRI IMAGING | Facility: HOSPITAL | Age: 28
End: 2018-11-19

## 2018-11-19 LAB — REAGIN AB CSF QL: NON REACTIVE

## 2018-11-20 LAB
ALB CSF/SERPL: 5 {RATIO} (ref 0–8)
ALBUMIN CSF-MCNC: 23 MG/DL (ref 11–48)
ALBUMIN SERPL-MCNC: 5 G/DL (ref 3.5–5.5)
CMV DNA SPEC QL NAA+PROBE: NEGATIVE
EBV DNA SPEC QL NAA+PROBE: NEGATIVE
IGG CSF-MCNC: 4.5 MG/DL (ref 0–8.6)
IGG SERPL-MCNC: 1435 MG/DL (ref 700–1600)
IGG SYNTH RATE SER+CSF CALC-MRATE: 2.3 MG/DAY
IGG/ALB CLEAR SER+CSF-RTO: 0.7 (ref 0–0.7)
IGG/ALB CSF: 0.2 {RATIO} (ref 0–0.25)
MBP CSF-MCNC: 2.9 NG/ML (ref 0–1.2)
OLIGOCLONAL BANDS.IT SER+CSF QL: ABNORMAL

## 2018-11-21 ENCOUNTER — HOSPITAL ENCOUNTER (OUTPATIENT)
Dept: MRI IMAGING | Facility: HOSPITAL | Age: 28
Discharge: HOME | End: 2018-11-21
Admitting: NURSE PRACTITIONER

## 2018-11-21 DIAGNOSIS — G35 MULTIPLE SCLEROSIS (HCC): ICD-10-CM

## 2018-11-21 LAB
B BURGDOR AB CSF IA-ACNC: 0.03 LIV
B BURGDOR IGG PATRN SER IB-IMP: NEGATIVE
B BURGDOR IGM PATRN SER IB-IMP: NEGATIVE
BACTERIA SPEC AEROBE CULT: NORMAL
CREAT BLDA-MCNC: 1 MG/DL (ref 0.6–1.3)
GRAM STN SPEC: NORMAL
P18 AB. IGG: NORMAL
P23 AB. IGG CSF: NORMAL
P23 AB. IGM CSF: NORMAL
P28 AB. IGG CSF: NORMAL
P30 AB. IGG CSF: NORMAL
P39 AB. IGG CSF: NORMAL
P39 AB. IGM CSF: NORMAL
P41 AB. IGG CSF: NORMAL
P41 AB. IGM CSF: NORMAL
P45 AB. IGG CSF: NORMAL
P58 AB. IGG CSF: NORMAL
P66 AB. IGG CSF: NORMAL
P93 AB. IGG CSF: NORMAL

## 2018-11-21 PROCEDURE — 0 GADOBENATE DIMEGLUMINE 529 MG/ML SOLUTION: Performed by: NURSE PRACTITIONER

## 2018-11-21 PROCEDURE — A9577 INJ MULTIHANCE: HCPCS | Performed by: NURSE PRACTITIONER

## 2018-11-21 PROCEDURE — 72142 MRI NECK SPINE W/DYE: CPT

## 2018-11-21 PROCEDURE — 82565 ASSAY OF CREATININE: CPT

## 2018-11-21 RX ADMIN — GADOBENATE DIMEGLUMINE 14 ML: 529 INJECTION, SOLUTION INTRAVENOUS at 10:01

## 2018-11-22 LAB
EV RNA SPEC QL NAA+PROBE: NEGATIVE
T GONDII IGG SERPL IA-ACNC: <3 IU/ML
T GONDII IGM SER IA-ACNC: <3 AU/ML
VZV IGM CSF-ACNC: 0 ISR

## 2018-11-23 LAB
CONV INDEX VALUE: 2.89
INTERPRETATION: ABNORMAL
INTERPRETATION: ABNORMAL
JCV ANTIBODY: POSITIVE

## 2018-11-28 ENCOUNTER — LAB (OUTPATIENT)
Dept: LAB | Facility: HOSPITAL | Age: 28
End: 2018-11-28

## 2018-11-28 ENCOUNTER — TELEPHONE (OUTPATIENT)
Dept: NEUROLOGY | Facility: CLINIC | Age: 28
End: 2018-11-28

## 2018-11-28 ENCOUNTER — OFFICE VISIT (OUTPATIENT)
Dept: NEUROLOGY | Facility: CLINIC | Age: 28
End: 2018-11-28

## 2018-11-28 VITALS
DIASTOLIC BLOOD PRESSURE: 80 MMHG | BODY MASS INDEX: 22.9 KG/M2 | WEIGHT: 160 LBS | RESPIRATION RATE: 18 BRPM | HEIGHT: 70 IN | SYSTOLIC BLOOD PRESSURE: 124 MMHG | HEART RATE: 80 BPM

## 2018-11-28 DIAGNOSIS — I49.3 PVC (PREMATURE VENTRICULAR CONTRACTION): ICD-10-CM

## 2018-11-28 DIAGNOSIS — I34.0 TRACE MITRAL VALVE REGURGITATION: ICD-10-CM

## 2018-11-28 DIAGNOSIS — G35 MULTIPLE SCLEROSIS (HCC): ICD-10-CM

## 2018-11-28 DIAGNOSIS — M50.30 DEGENERATIVE DISC DISEASE, CERVICAL: ICD-10-CM

## 2018-11-28 DIAGNOSIS — E55.9 VITAMIN D DEFICIENCY: ICD-10-CM

## 2018-11-28 DIAGNOSIS — M54.2 NECK PAIN: ICD-10-CM

## 2018-11-28 DIAGNOSIS — M51.36 BULGE OF LUMBAR DISC WITHOUT MYELOPATHY: ICD-10-CM

## 2018-11-28 DIAGNOSIS — G35 MULTIPLE SCLEROSIS (HCC): Primary | ICD-10-CM

## 2018-11-28 DIAGNOSIS — R20.0 NUMBNESS AND TINGLING: ICD-10-CM

## 2018-11-28 DIAGNOSIS — R20.2 NUMBNESS AND TINGLING: ICD-10-CM

## 2018-11-28 DIAGNOSIS — M54.42 ACUTE LEFT-SIDED LOW BACK PAIN WITH LEFT-SIDED SCIATICA: ICD-10-CM

## 2018-11-28 PROBLEM — M51.369 BULGE OF LUMBAR DISC WITHOUT MYELOPATHY: Status: ACTIVE | Noted: 2018-11-28

## 2018-11-28 LAB
25(OH)D3 SERPL-MCNC: 34.3 NG/ML (ref 30–100)
HBV SURFACE AB SER QL: 176
HBV SURFACE AB SER RIA-ACNC: NORMAL
HBV SURFACE AG SERPL QL IA: NEGATIVE
HCV AB SER DONR QL: NEGATIVE
HCV S/C RATIO: 0.03 (ref 0–0.99)

## 2018-11-28 PROCEDURE — 99214 OFFICE O/P EST MOD 30 MIN: CPT | Performed by: NURSE PRACTITIONER

## 2018-11-28 PROCEDURE — 86706 HEP B SURFACE ANTIBODY: CPT | Performed by: NURSE PRACTITIONER

## 2018-11-28 PROCEDURE — 82306 VITAMIN D 25 HYDROXY: CPT | Performed by: NURSE PRACTITIONER

## 2018-11-28 PROCEDURE — 86704 HEP B CORE ANTIBODY TOTAL: CPT | Performed by: NURSE PRACTITIONER

## 2018-11-28 PROCEDURE — 86708 HEPATITIS A ANTIBODY: CPT | Performed by: NURSE PRACTITIONER

## 2018-11-28 PROCEDURE — 87340 HEPATITIS B SURFACE AG IA: CPT | Performed by: NURSE PRACTITIONER

## 2018-11-28 PROCEDURE — 86803 HEPATITIS C AB TEST: CPT | Performed by: NURSE PRACTITIONER

## 2018-11-28 PROCEDURE — 36415 COLL VENOUS BLD VENIPUNCTURE: CPT

## 2018-11-28 RX ORDER — BACLOFEN 10 MG/1
10 TABLET ORAL 2 TIMES DAILY PRN
Qty: 30 TABLET | Refills: 1 | Status: SHIPPED | OUTPATIENT
Start: 2018-11-28 | End: 2018-11-28

## 2018-11-28 RX ORDER — BACLOFEN 10 MG/1
10 TABLET ORAL 2 TIMES DAILY PRN
Qty: 30 TABLET | Refills: 0 | Status: SHIPPED | OUTPATIENT
Start: 2018-11-28 | End: 2018-12-06 | Stop reason: SINTOL

## 2018-11-28 RX ORDER — MELOXICAM 7.5 MG/1
7.5 TABLET ORAL DAILY
Qty: 15 TABLET | Refills: 0 | Status: SHIPPED | OUTPATIENT
Start: 2018-11-28 | End: 2019-01-16

## 2018-11-28 NOTE — PATIENT INSTRUCTIONS
Finish Medrol dose pack and then begin Meloxicam 7.5 mg 1 tablet daily with food for back pain.    Stop Flexeril.         Multiple Sclerosis        Multiple sclerosis (MS) is a disease of the central nervous system. It leads to the loss of the insulating covering of the nerves (myelin sheath) of your brain. When this happens, brain signals do not get sent properly or may not get sent at all. The age of onset of MS varies.  What are the causes?  The cause of MS is unknown. However, it is more common in the [place] than in the southern [place].  What increases the risk?  There is a higher number of women with MS than men. MS is not an illness that is passed down to you from your family members (inherited). However, your risk of MS is higher if you have a relative with MS.  What are the signs or symptoms?  The symptoms of MS occur in episodes or attacks. These attacks may last weeks to months. There may be long periods of almost no symptoms between attacks. The symptoms of MS vary. This is because of the many different ways it affects the central nervous system. The main symptoms of MS include:  · Vision problems and eye pain.  · Numbness.  · Weakness.  · Inability to move your arms, hands, feet, or legs (paralysis).  · Balance problems.  · Tremors.    How is this diagnosed?  Your health care provider can diagnose MS with the help of imaging exams and lab tests. These may include specialized X-ray exams and spinal fluid tests. The best imaging exam to confirm a diagnosis of MS is an MRI.  How is this treated?  There is no known cure for MS, but there are medicines that can decrease the number and frequency of attacks. Steroids are often used for short-term relief. Physical and occupational therapy may also help. There are also many new alternative or complementary treatments available to help control the symptoms of MS. Ask your health care provider if any of these other options are right for  you.  Follow these instructions at home:  · Take medicines as directed by your health care provider.  · Exercise as directed by your health care provider.  Contact a health care provider if:  You begin to feel depressed.  Get help right away if:  · You develop paralysis.  · You have problems with bladder, bowel, or sexual function.  · You develop mental changes, such as forgetfulness or mood swings.  · You have a period of uncontrolled movements (seizure).  This information is not intended to replace advice given to you by your health care provider. Make sure you discuss any questions you have with your health care provider.  Document Released: 12/15/2001 Document Revised: 05/25/2017 Document Reviewed: 08/25/2014  [employer] Interactive Patient Education © 2017 [employer]

## 2018-11-28 NOTE — PROGRESS NOTES
Subjective     Chief Complaint   Patient presents with   • Numbness     MS Deangelo Hall is a 28 y.o.  male present with family today for FU of testing. He isAn interpeter was used for the entirety of this visit with identification code of #[id number]. He was a new patient to our practice on 10/25/2018 for onset of temperature changes/parasthesia of the left side of his body from neck to foot. He underwent testing including MRI Brain, cervical spine, and thoracic spine. Tests were indicative of Multiple Sclerosis therefore he was sent for lumbar puncture. He did have a traumatic lumbar tap and states that he had to be stuck twice. After the second stick he developed radiating pain down his left leg.  He presented to the ED on 11/18/2018 2 days after LP with complaints of back pain and pain down the left leg. A MRI of Lumbar spine was performed and patient was noted to have minor bulge of disc present in L4-5, L3-4 with no herniation. No abnormal enhancement was noted. He was given a medrol dose pack and Flexeril. He states that he has been taking his Flexeril TID and he has two days left of his medrol dose pack. He denies any GI upset. He states that Flexeril does help him but he continues to have pain with bending and weakness with walking long distances. There was a note in ER report that he was to FU with Dr. Ceja in one week but I do not see where that referral was made and patient was not aware of this. He has not tried PT. He does work at [employer] and standing/bending is aggravating him. He localizes all his discomfort at site of LP radiating down left leg. He denies any other symptoms otherwise. He has not had reoccurrence of temperature changes/parasthesias in the left side of his body. He denies any difficulty urinating/bowel movements, vision changes, blurry vision, double vision, or weakness to other parts of his body. He has not established with PCP. He is here today to talk about all  his testing.     As you recall, He was last evaluated by myself on 10/25/2018 as a new patient to Neurology practice from ED visit. episodes on left sided numbness and paraesthesia involving the left side of body from neck to foot.  These spells are intermittent and have happened twice.  He stated that he has not had any episodes since last ER visit on 8/2018 but continues to have pain in his neck area with numbness.  He continued to deny any trauma, recent illness, or weakness. He was also evaluated at a hospital in [place] for the same problem but we did not have access to those records and the patient does not know which hospital it was.     He denied any known PMH other than being premature infant. At times he will feel his heart fluttering and relates that he was told he had a heart problem as an infant.  Patient nor family in the room can determine what kind of heart condition.  He denies any tobacco, alcohol, or illicit drug use.   Overall, he is improved today. He does relate continued pain to the left shoulder area when lying on it with numbness down the left arm. He is unable to sleep on this side.      Neurologic Problem   The patient's primary symptoms include weakness. Primary symptoms comment: multiple sclerosis. New diagnosis 10/2018. This is a new problem. The neurological problem developed gradually. Associated symptoms include back pain. Pertinent negatives include no bladder incontinence, bowel incontinence or vertigo. (Numbness and paraesthesias on the left side. ) (Deaf)   Back Pain   This is a new problem. Episode onset: immediately following LP 11/2018. The problem occurs daily. The problem is unchanged. The pain is present in the lumbar spine. The pain radiates to the left foot. The symptoms are aggravated by bending, lying down, sitting and standing. Associated symptoms include weakness. Pertinent negatives include no bladder incontinence or bowel incontinence. Treatments tried: flexeril,  "medrol dose pack.        Current Outpatient Medications   Medication Sig Dispense Refill   • MethylPREDNISolone (MEDROL, DANAY,) 4 MG tablet Take as directed on package instructions. 21 tablet 0   • vitamin D (ERGOCALCIFEROL) 95234 units capsule capsule Take 1 capsule by mouth 1 (One) Time Per Week for 4 doses. 4 capsule 0   • baclofen (LIORESAL) 10 MG tablet Take 1 tablet by mouth 2 (Two) Times a Day As Needed for Muscle Spasms. AS NEEDED for muscle spasm 30 tablet 0   • meloxicam (MOBIC) 7.5 MG tablet Take 1 tablet by mouth Daily. 15 tablet 0     No current facility-administered medications for this visit.        Past Medical History:   Diagnosis Date   • Deaf        History reviewed. No pertinent surgical history.    family history is not on file.    Social History     Tobacco Use   • Smoking status: Never Smoker   • Smokeless tobacco: Never Used   Substance Use Topics   • Alcohol use: Yes     Comment: socially   • Drug use: No       Review of Systems   HENT: Negative.    Eyes: Negative.    Respiratory: Negative.    Cardiovascular: Negative.    Gastrointestinal: Negative.  Negative for bowel incontinence.   Endocrine: Negative.    Genitourinary: Negative.  Negative for urinary incontinence.   Musculoskeletal: Positive for back pain.   Skin: Negative.    Allergic/Immunologic: Negative.    Neurological: Positive for weakness. Negative for vertigo.   Hematological: Negative.    Psychiatric/Behavioral: Negative.    All other systems reviewed and are negative.      Objective     /80 (BP Location: Left arm, Patient Position: Sitting)   Pulse 80   Resp 18   Ht 177.8 cm (70\")   Wt 72.6 kg (160 lb)   BMI 22.96 kg/m² , Body mass index is 22.96 kg/m².    Physical Exam   Constitutional: He is oriented to person, place, and time. He appears well-developed and well-nourished.   HENT:   Head: Normocephalic and atraumatic.   Eyes: EOM are normal. Pupils are equal, round, and reactive to light.   Neck: Normal range of " motion. Neck supple.   Cardiovascular: Normal rate, normal heart sounds and intact distal pulses.   Pulmonary/Chest: Effort normal and breath sounds normal.   Abdominal: Soft.   Musculoskeletal: Normal range of motion.   Positive straight leg raise on left lower extremity in office   Neurological: He is alert and oriented to person, place, and time. He has normal strength. He displays no atrophy. No cranial nerve deficit or sensory deficit. He exhibits normal muscle tone. He displays a negative Romberg sign. GCS eye subscore is 4. GCS verbal subscore is 5. GCS motor subscore is 6.   Reflex Scores:       Tricep reflexes are 2+ on the right side and 2+ on the left side.       Bicep reflexes are 2+ on the right side and 2+ on the left side.       Brachioradialis reflexes are 2+ on the right side and 2+ on the left side.       Patellar reflexes are 2+ on the right side and 2+ on the left side.       Achilles reflexes are 2+ on the right side and 2+ on the left side.  Awake, alert. No aphasia, no dysarthria  Completes simple and complex commands    CN II:  Visual fields full.  Pupils equally reactive to light  CN III, IV, VI:  Extraocular Muscles full with no signs of nystagmus  CN V:  Facial sensory is symmetric with no asymetries.  CN VII:  Facial motor symmetric  CN VIII:  Gross hearing intact bilaterally  CN IX:  Palate elevates symmetrically  CN X:  Palate elevates symmetrically  CN XI:  Shoulder shrug symmetric  CN XII:  Tongue is midline on protrusion    Full and symmetric strength bilateral upper and lower extremities.   Skin: Skin is warm and dry. Capillary refill takes less than 2 seconds.   Psychiatric: He has a normal mood and affect. His speech is normal and behavior is normal. Cognition and memory are normal.   Nursing note and vitals reviewed.        Results for orders placed or performed in visit on 11/28/18   Vitamin D 25 Hydroxy   Result Value Ref Range    25 Hydroxy, Vitamin D 34.3 30.0 - 100.0 ng/ml       MRI Brain:  FINDINGS:   Diffusion: No restriction of diffusion to suggest acute ischemia.     Midline structures: Nondisplaced.     Ventricles: Normal in configuration and symmetric in size.     Masses: No masses or mass effect.     Basilar cisterns: Maintained.     Extra axial space: No abnormal extra-axial fluid.     Gray-white matter signal: There are several foci of abnormal T2 signal  within the periventricular and higher white matter tracts. This includes  a subcortical lesion within the right frontal lobe and a lesion  involving the left frontal callosal septal interface. Radiographic  findings are suspicious for plaques of multiple sclerosis.     Cerebellum: Normal.     Brainstem: Normal.     Enhancement: No abnormal enhancement.     Other: Proximal cervical spinal cord is normal. Bilateral globes and  orbits are normal in appearance. Normal cerebrovascular flow voids  noted. No abnormal signal in the mastoid air cells or paranasal sinuses.     IMPRESSION:  1. There are several foci of T2 abnormality involving the  periventricular and higher white matter tracts. This includes a right  frontal subcortical lesion as well as a lesion involving the left  frontal periventricular white matter at the callosal septal interface.  Several of these plaques are quite typical in distribution and  appearance for plaques of multiple sclerosis. There is no associated  restricted diffusion or abnormal contrast enhancement. Lumbar puncture  with CSF studies may be helpful for confirmation of diagnosis.  2. Otherwise unremarkable MRI of the brain with and without contrast    MRI Cervical Spine without contrast:  FINDINGS:   Imaged portions of the cerebellum and brainstem are unremarkable.      Alignment: There is loss of normal cervical lordosis. No evidence of  listhesis. There is mild degenerative disc disease at C4-C5, C5-C6 and  C6-C7 levels with disc desiccation and mild narrowing of the disc space  height. There is  mild associated spondylosis..      Marrow signal: No pathologic marrow infiltrate is demonstrated. The  vertebral body heights and posterior elements are maintained.      Cord/Canal: There is a intramedullary lesion measuring approximately 10  mm in size within the right half of the spinal cord at the C2 level.  There is minimal if any expansion of the cord. Given the findings on the  MRI of the brain I suspect this represents a plaque of multiple  sclerosis..      Soft tissues: The surrounding soft tissues are unremarkable.      Levels:   C2-C3: Right-sided uncinate spurring results in mild-to-moderate  right-sided foraminal narrowing. There is no central or left foraminal  stenosis..      C3-C4: There is mild bulging of the disc. Uncinate spurring is present  resulting in mild left-sided and moderate right-sided foraminal  narrowing. There is no central stenosis..      C4-C5: There is degenerative disc disease with mild bulging of the disc.  Uncinate spurring is present resulting in mild bilateral neural  foraminal narrowing. There is no central stenosis..      C5-C6: There is degenerative disease with mild bulging of disc and  spurring. No central or left foraminal stenosis is present. There is  mild right foraminal narrowing..      C6-C7: There is mild bulging of disc and posterior spondylosis. No  central or foraminal stenosis..      C7-T1: No disc bulge is present. No significant neuroforaminal or  central canal stenosis is seen.      IMPRESSION:  1. Intramedullary lesion within the right half of the cord at the C2  level with minimal if any expansion of the cord. Given the findings on  today's MRI of the brain I suspect this represents a plaque of multiple  sclerosis. No other lesions are present.  2. Loss of normal cervical lordosis with mild degenerative disc disease  and spondylosis in the mid and lower cervical spine as described above.  3. No evidence of central stenosis. Neural foraminal narrowing is  noted  at several levels related to uncinate spurring and facet overgrowth.    MRI Cervical Spine with contrast:  FINDINGS: C1 through T2 visualized. 7 cervical vertebral bodies. Normal  alignment. Bone marrow signal within normal limits. No evidence of acute  compression fracture. Cord signal normal. Craniocervical junction within  normal limits. Disc heights are within normal limits.      Normal T1 cord signal. No abnormal enhancement of the cord. Specific  attention was given to the cord at C2 given previously demonstrated T2  hyperintense lesion.     C2-C3:  No spinal canal or foraminal stenosis. Mild right foraminal  stenosis due to uncovertebral hypertrophy.  C3-C4:  No disc bulge or spinal canal stenosis. Mild bilateral foraminal  stenosis due to uncovertebral hypertrophy.  C4-C5:  No disc bulge or spinal canal stenosis. Mild right foraminal  stenosis due to uncovertebral hypertrophy. No left foraminal stenosis.  C5-C6:  No disc bulge or spinal canal stenosis. Mild bilateral foraminal  stenosis due to uncovertebral hypertrophy.  C6-C7:  Mild disc bulge better appreciated on comparison exam. No spinal  canal or foraminal stenosis.  C7-T1:  No evidence of disc bulge, spinal canal, or foraminal stenosis.     Extraspinal soft tissues are within normal limits.        IMPRESSION:  1. No abnormal enhancement of the spinal cord.  2. Mild degenerative changes as above.    MRI Lumbar Spine:  Sagittal images demonstrate mild straightening of the lumbar curvature  with no focal malalignment identified. There is normal hydration of the  lumbar disks. The conus tip is visualized at the T12-L1 level, appears  unremarkable. Marrow signal is homogeneous and normal. The axial images  are reviewed level by level.     L5-S1: Unremarkable.     L4-5: Minor bulge of the disc is present and there is no disc  herniation.     L3-4: Mild bulging the disc is present, no disc herniation is  identified.     L2-3: Unremarkable.     L1-2:  "Unremarkable.     Post contrast images show no abnormal enhancement.     IMPRESSION:  No acute findings, minimal bulging of the discs at L3-4 and  L4-5. No abnormal contrast enhancement is identified.      EEG:  Clinical History:  28 year old male with left upper extremity numbness and tingling.     Medications:  Vit D     Report:  This is an 18 channel EEG recording.  The background activity is best seen over the occipital leads and ranges between 8-10 Hz.  This rhythm attenuates with eye opening.       Sleep Architecture:  none  Photic Stimulation:  Photic driving noted  Hyperventilation:  Not performed     Asymetries:  None  Epileptiform Activities:  None     Findings: Normal awake EEG     US Segmental limbs upper arterial with stress:  IMPRESSION:  Impression:  1.  No significant arterial insufficiency of the right upper extremity  at rest or with provocative maneuvers.   2.  No significant arterial insufficiency of the left upper extremity at  rest or with provocative maneuvers.        Holter Monitor:  · A normal monitor study.  · The predominant rhythm noted during the testing period was sinus rhythm.  · Average HR: 61. Min HR: 39. Max HR: 153.  · Premature atrial contractions did not appear during monitoring.  · Premature ventricular contractions occured rarely (5 isolated PVCs). There was one strip labeled \"ventricular run\"; however, there were no PVCs on this strip - there was no ventricular run identified during monitoring.  · No symptoms reported during the monitoring period.     TTE:  · Left ventricular systolic function is normal. Estimated EF appears to be in the range of 61 - 65%.  · No significant valvular abnormalities identified on this study.           ASSESSMENT/PLAN    Diagnoses and all orders for this visit:    Multiple sclerosis (CMS/HCC)  -     Hepatitis B surface antigen; Future  -     Hepatitis B surface antibody; Future  -     Hepatitis B core antibody, total; Future  -     Hepatitis A " antibody, total; Future  -     Hepatitis C antibody; Future  -     Vitamin D 25 Hydroxy; Future  -     Ambulatory Referral to Family Practice  -     Ambulatory Referral to Neurology  -     Ambulatory Referral to Ophthalmology    Vitamin D deficiency  -     Vitamin D 25 Hydroxy; Future    Acute left-sided low back pain with left-sided sciatica  -     Ambulatory Referral to Physical Therapy Evaluate and treat; Strengthening; Full weight bearing    Bulge of lumbar disc without myelopathy  -     Ambulatory Referral to Physical Therapy Evaluate and treat; Strengthening; Full weight bearing  -     Ambulatory Referral to Neurosurgery    Numbness and tingling    Neck pain  -     Ambulatory Referral to Neurosurgery    Degenerative disc disease, cervical  -     Ambulatory Referral to Neurosurgery    PVC (premature ventricular contraction)  -     Ambulatory Referral to Cardiology    Trace mitral valve regurgitation    Other orders  -     Discontinue: baclofen (LIORESAL) 10 MG tablet; Take 1 tablet by mouth 2 (Two) Times a Day As Needed for Muscle Spasms. AS NEEDED for muscle spasm  -     meloxicam (MOBIC) 7.5 MG tablet; Take 1 tablet by mouth Daily.  -     baclofen (LIORESAL) 10 MG tablet; Take 1 tablet by mouth 2 (Two) Times a Day As Needed for Muscle Spasms. AS NEEDED for muscle spasm        Allergies and all known medications/prescriptions have been reviewed using resources available on this encounter.    Patient's Body mass index is 22.96 kg/m². BMI is within normal parameters. No follow-up required.    Return in about 4 weeks (around 2018).    MEDICAL DECISION MAKIN. Discussed MRI Brain, cervical, thoracic with patient and family member today. LP results were indicative of MS. Myelin basis protein CSF was elevated at 2.9 and 6 oligoclonal bands were observed in the CSF. Patient had a JCV antibody drawn which was positive with an index value of 2.89. Tyabri is not an option for patient. I would like to refer  patient to [facility] clinic at [facility] for second opinion on treatment plans. We did discuss Ocrevus as next option and a hepatitis panel was drawn but I would like input from  prior to starting.     2. Patient continues to complain of pain at LP site with radiculopathy into left leg. MRI cervical spine reviewed in office noted for cervical degeneration with spondylosis . He denies any neck pain today. He also has disc L3-4/ 4-5 with no bulging noted on MRI Lumbar spine performed by ER. We will discontinue Flexeril and begin a short course of baclofen 10 mg take one table BID PRN for muscle spasms. Patient and family counseled to take this ONLY if needed. Patient is to finish last two days of medrol dose pack and then begin Meloxicam 7.5 mg take one tablet daily with food # 15 . Risks and benefits of this medications were discussed. He will discontinue of he develops GERD or GI discomfort. He denies history of GERD.     3. I would like for him to establish with PCP for routine care and management. Referral placed today.     4. Referral to ophthalmology for eye exam. Currently denies any visual problems but will need in depth testing.     5. Referral to Neurosurgery for evaluation of #2.  Patient is to begin PT as well in regards to back pain.    6. Obtain hepatitis panel today as well as a repeat Vitamin D level. Family disclosed at the end of the visit that patient misunderstood directions with vitamin D and took the medication once a day for four days. Will likely need to be on long term vitamin D3 given diagnosis of MS.    7. Work note given to patient in regards to restrictions. Patient would like to keep working at this time.     8. Referral to cardiology for PVC noted on holter monitor as well as trace mitral valve regurgitation for consideration and further evaluation. TTE was normal.      1 hour discussion with patient and family member with assist from interpretor in regards to multiple sclerosis. Information  given to patient on Ocrevus. I would like input from [facility] in regards to best treatment option for him given his JCV lab. I would like to see patient back in the office in 4 weeks for assessment. Long discussion with family in regards to multiple referrals for other issues as well. Complex decision making was encountered during this visit.           Berta Oconnor, APRN

## 2018-11-28 NOTE — TELEPHONE ENCOUNTER
It is hard to say. Dizziness and vertigo are signs associated with MS, however, this could also be related to multiple other medical issues including BP. He needs to keep a log of BP readings sitting and standing and report readings that are abnormal to his PCP ( or us as I am trying to get his established with one). If dizziness is associated with new or worsening issues will need to be evaluated. Also, if he associated dizziness with muscle relaxer he can stop taking those and see if this helps.

## 2018-11-29 LAB
HAV AB SER QL IA: NEGATIVE
HBV CORE AB SER DONR QL IA: NEGATIVE

## 2018-12-03 ENCOUNTER — DOCUMENTATION (OUTPATIENT)
Dept: NEUROLOGY | Facility: CLINIC | Age: 28
End: 2018-12-03

## 2018-12-03 RX ORDER — ERGOCALCIFEROL 1.25 MG/1
50000 CAPSULE ORAL WEEKLY
Qty: 4 CAPSULE | Refills: 0 | Status: SHIPPED | OUTPATIENT
Start: 2018-12-03 | End: 2018-12-25

## 2018-12-03 NOTE — PROGRESS NOTES
Spoke with patient's girl friend and I did go over the lab work with her.  I did let her know the patient will need to do the Vitamin D again.  The prescription has been sent to the pharmacy.

## 2018-12-06 ENCOUNTER — OFFICE VISIT (OUTPATIENT)
Dept: INTERNAL MEDICINE | Facility: CLINIC | Age: 28
End: 2018-12-06

## 2018-12-06 VITALS
WEIGHT: 169 LBS | RESPIRATION RATE: 12 BRPM | BODY MASS INDEX: 24.2 KG/M2 | HEIGHT: 70 IN | TEMPERATURE: 97.6 F | SYSTOLIC BLOOD PRESSURE: 124 MMHG | DIASTOLIC BLOOD PRESSURE: 92 MMHG | OXYGEN SATURATION: 100 % | HEART RATE: 74 BPM

## 2018-12-06 DIAGNOSIS — M51.36 BULGE OF LUMBAR DISC WITHOUT MYELOPATHY: ICD-10-CM

## 2018-12-06 DIAGNOSIS — G47.09 OTHER INSOMNIA: ICD-10-CM

## 2018-12-06 DIAGNOSIS — G35 MULTIPLE SCLEROSIS (HCC): ICD-10-CM

## 2018-12-06 DIAGNOSIS — M51.36 BULGE OF LUMBAR DISC WITHOUT MYELOPATHY: Primary | ICD-10-CM

## 2018-12-06 DIAGNOSIS — R42 DIZZINESS: ICD-10-CM

## 2018-12-06 PROBLEM — H91.90 DEAF: Status: ACTIVE | Noted: 2018-12-06

## 2018-12-06 PROCEDURE — 99204 OFFICE O/P NEW MOD 45 MIN: CPT | Performed by: FAMILY MEDICINE

## 2018-12-06 RX ORDER — CYCLOBENZAPRINE HCL 10 MG
10 TABLET ORAL 2 TIMES DAILY PRN
Qty: 90 TABLET | Refills: 1 | Status: SHIPPED | OUTPATIENT
Start: 2018-12-06 | End: 2018-12-06 | Stop reason: SDUPTHER

## 2018-12-06 RX ORDER — CYCLOBENZAPRINE HCL 10 MG
10 TABLET ORAL 2 TIMES DAILY PRN
Qty: 90 TABLET | Refills: 1 | Status: SHIPPED | OUTPATIENT
Start: 2018-12-06 | End: 2019-01-16

## 2018-12-06 RX ORDER — HYDROXYZINE HYDROCHLORIDE 25 MG/1
25 TABLET, FILM COATED ORAL NIGHTLY PRN
Qty: 30 TABLET | Refills: 1 | Status: SHIPPED | OUTPATIENT
Start: 2018-12-06 | End: 2018-12-06 | Stop reason: SDUPTHER

## 2018-12-06 RX ORDER — HYDROXYZINE HYDROCHLORIDE 25 MG/1
25 TABLET, FILM COATED ORAL NIGHTLY PRN
Qty: 30 TABLET | Refills: 1 | Status: SHIPPED | OUTPATIENT
Start: 2018-12-06 | End: 2022-10-04 | Stop reason: RX

## 2018-12-06 NOTE — PROGRESS NOTES
CC:   Chief Complaint   Patient presents with   • Establish Care     Girlfriend Santa translating for the patient, states no problems today       History:  Deangelo Hall is a 28 y.o. male who presents today for evaluation of the above problems.      Has MS, tested positive for THA virus, awaiting [facility] consult for Tx recommendations    Trouble sleeping, only sleeps 4-5 hours per night. Denies certain thoughts keeping him awake. Reports irritation and anger around his back pain, fearful of not being able to be active and play basketball due to MS.    Has back pain from traumatic lumbar puncture, has been having back pain, also has bulging disc, minor at L3-4 and L4-5. He has number to call for PT. Was on baclofen, but stopped due to pelvic pain and spasms near his groin that went away after DC of med.     Reports dizzy spells at work. Was told to check blood pressure at home, but they do not have anything to check it.     ROS:  Review of Systems   Constitutional: Negative for chills and fever.   HENT: Negative.    Eyes: Negative.    Respiratory: Negative.    Cardiovascular: Negative.    Gastrointestinal: Negative.    Endocrine: Negative.    Genitourinary: Negative.    Musculoskeletal: Positive for back pain.   Skin: Negative.    Neurological: Numbness: better after MS flare.   Psychiatric/Behavioral: Positive for agitation.     Allergies   Allergen Reactions   • Shellfish-Derived Products Anaphylaxis   • Hydrocodone Nausea And Vomiting   • Aldomet [Methyldopa] Rash     Pt denies    • Augmentin [Amoxicillin-Pot Clavulanate] Rash     Past Medical History:   Diagnosis Date   • Anxiety    • Deaf    • Hearing impaired    • THA virus antibody positive    • Multiple sclerosis (CMS/HCC)      History reviewed. No pertinent surgical history.  Family History   Problem Relation Age of Onset   • Dementia Maternal Grandmother    • Cancer Paternal Grandmother    • Hypertension Paternal Grandmother    • Heart disease Paternal  "Grandfather       reports that  has never smoked. he has never used smokeless tobacco. Drug use questions deferred to the physician. He reports that he does not drink alcohol.      Current Outpatient Medications:   •  meloxicam (MOBIC) 7.5 MG tablet, Take 1 tablet by mouth Daily., Disp: 15 tablet, Rfl: 0  •  vitamin D (ERGOCALCIFEROL) 17644 units capsule capsule, Take 1 capsule by mouth 1 (One) Time Per Week for 4 doses., Disp: 4 capsule, Rfl: 0  •  cyclobenzaprine (FLEXERIL) 10 MG tablet, Take 1 tablet by mouth 2 (Two) Times a Day As Needed for Muscle Spasms., Disp: 90 tablet, Rfl: 1  •  hydrOXYzine (ATARAX) 25 MG tablet, Take 1 tablet by mouth At Night As Needed (insomnia)., Disp: 30 tablet, Rfl: 1    OBJECTIVE:  /92 (BP Location: Left arm, Patient Position: Sitting, Cuff Size: Adult)   Pulse 74   Temp 97.6 °F (36.4 °C) (Oral)   Resp 12   Ht 177.8 cm (70\")   Wt 76.7 kg (169 lb)   SpO2 100%   BMI 24.25 kg/m²    Physical Exam   Constitutional: He is oriented to person, place, and time. No distress.   HENT:   Head: Normocephalic and atraumatic.   Right Ear: External ear normal.   Left Ear: External ear normal.   Nose: Nose normal.   Mouth/Throat: Oropharynx is clear and moist.   TMs neg   Eyes: Conjunctivae are normal. Right eye exhibits no discharge. Left eye exhibits no discharge. No scleral icterus.   Neck: No JVD present. No tracheal deviation present. No thyromegaly present.   Cardiovascular: Normal rate, regular rhythm and normal heart sounds. Exam reveals no gallop and no friction rub.   No murmur heard.  Pulmonary/Chest: Effort normal and breath sounds normal. No respiratory distress. He has no wheezes. He has no rales.   Musculoskeletal: He exhibits no edema.   Hypertonic lumbar paraspinals with b/l psoas spasm and lumbosacral  compression   Lymphadenopathy:     He has no cervical adenopathy.   Neurological: He is alert and oriented to person, place, and time. He displays normal reflexes. No " sensory deficit. He exhibits normal muscle tone.   Skin: Skin is warm and dry. Capillary refill takes less than 2 seconds. He is not diaphoretic. No pallor.   Psychiatric: He has a normal mood and affect. His behavior is normal. Judgment and thought content normal.   Nursing note and vitals reviewed.      Assessment/Plan    Problem List Items Addressed This Visit     Other insomnia     Hydroxyzine trial         Relevant Medications    hydrOXYzine (ATARAX) 25 MG tablet    Bulge of lumbar disc without myelopathy - Primary     Flexeril PRN pain  Discussed OMT, recommend f/u OMT appt for further evaluation  Continue with PT assessment and referral         Relevant Medications    cyclobenzaprine (FLEXERIL) 10 MG tablet    Multiple sclerosis (CMS/HCC)     Currently stable, followed by neurology awaiting consult for treatment         Dizziness     Unclear etiology, MS vs orthostatic HTN, recent holter monitor reviewed without significant arrhythmia.   -home BP monitoring  -f/u PRN         Relevant Orders    Miscellaneous DME          Patient's Body mass index is 24.25 kg/m². BMI is within normal parameters. No follow-up required.      An After Visit Summary was printed and given to the patient at discharge.  Return in about 4 weeks (around 1/3/2019) for 30 min appt.         Ildefonso Lawler D.O.  [facility]  Osteopathic Neuromusculoskeletal Medicine  12/6/2018

## 2018-12-06 NOTE — ASSESSMENT & PLAN NOTE
Flexeril PRN pain  Discussed OMT, recommend f/u OMT appt for further evaluation  Continue with PT assessment and referral

## 2018-12-06 NOTE — ASSESSMENT & PLAN NOTE
Unclear etiology, MS vs orthostatic HTN, recent holter monitor reviewed without significant arrhythmia.   -home BP monitoring  -f/u PRN

## 2018-12-06 NOTE — PATIENT INSTRUCTIONS
Try flexeril for muscle spasms. Take a dose at home to see how it will affect you before you use it during the day at work.     Try hydroxyzine for sleep. Do not take it with the flexeril.     If you are interested in osteopathic manipulative treatment (OMT) for your back, please make an appointment. I have used this to help patients with MS as well as bulging discs and back pain.     What is Osteopathic Medicine  Osteopathic medicine provides all of the benefits of modern medicine including prescription drugs, surgery, and the use of technology to diagnose disease and evaluate injury. It also offers the added benefit of hands-on diagnosis and treatment through a system of therapy known as osteopathic manipulative medicine. Osteopathic medicine emphasizes helping each person achieve a high level of wellness by focusing on health promotion and disease prevention. ([email or web address])     What is a DO  Doctor's of Osteopathy (DO) are fully trained physicians, capable of practicing the entire scope of medicine. In addition to conventional medical practice, DO’s are trained to use their hands to palpate (feel) tissue function and dysfunction. Gentle manipulative techniques are applied, restoring optimal function (motion).     4 Principles define Osteopathic Medicine  • The body is a fully integrated being of body, mind and spirit  • The body is capable of self-regulation, self-healing, and health maintenance  • Structure and function are interrelated  • Rational treatment is based upon an understanding of the basic principles of body unity, self-regulation, and the relationship of structure and function     Osteopathic Manipulative Medicine (OMM)   OMM encompasses a wide range of techniques addressing problems in joints, ligaments, muscles, tendons, and fascia (tissue surrounding muscles and other organs) that may cause pain or interfere with the body’s function. When there are restrictions within the structure of the body it  does not function properly, often times causing pain. Using different techniques (listed below) the restrictions in the body are relieved so the body can function at optimal health. Patients often experience a sense of deep relaxation, tingling, fluid flows and relief of pain. These changes may be experienced immediately as they occur or later after the treatment. OMM may be referred to as Osteopathic Manipulative Treatment (OMT).     Common Treatment Modalities  Osteopathy in the Cranial Field- a system of treatment that utilizes the intrinsic motion of the cranial and neurological system to treat the whole body     Myofascial Release - used to treat restrictions of muscle and fascia     Counterstrain - focused on specific tender points on the body that are held in a position of comfort for 90 seconds, after which the tenderness is relieved     Muscle Energy - uses the relaxation after a muscle is contracted to stretch muscles and increase range of motion     Balanced Ligamentous Tension - ligaments or joints are placed into a state of balanced until the tension is relieved     Facilitated Positional Release - patient’s spine is placed at neutral position while the isolated segment for treatment is placed at ease. Compression or traction is then added to release the tension in the muscle, fascia, and/or joints     High Velocity Low Amplitude (HVLA)- use of fast, short thrusts through restricted joints; a technique with which most people are familiar (also known as the “cracking” or “popping” technique)     Who would benefit  Osteopathy treats the patient, not the disease. Our intention is to find and restore health as well as structural integrity and fluid continuity.      Some of the problems that typically respond to osteopathic treatment:  · SOMATIC PAIN  · Back, neck, and joint pain  · Sciatica  · Headaches/Migraines  · Temporal Mandibular Joint Dysfunction (TMJ)     · TRAUMATIC INJURIES  · Head trauma  · Post  Concussion Syndrome  · Overuse Syndromes  · Whiplash Syndromes     · CHRONIC CONDITIONS UNRESPONSIVE TO CONVENTIAL TREATMENT  · Neurologic disorders  · Gastrointestinal disorders  · Genitourinary disorders  · Respiratory Disorders     · WOMEN DURING PREGNANCY can be made more comfortable, their labor and delivery eased considerably by providing freedom to the ligamentous support of the uterus and pelvis.     ADDITIONAL RESOURCES  www.osteopathic.org  www.[employer]  http://[email or web address].com/aboutosteopathy/research/ (Research articles)  Book: Dr. Mendoza’s Touch of Life by Francis Mendoza DO     Information gathered from [employer],  aacom.org, A Brief Guide to Osteopathic Medicine.

## 2018-12-20 ENCOUNTER — APPOINTMENT (OUTPATIENT)
Dept: GENERAL RADIOLOGY | Facility: HOSPITAL | Age: 28
End: 2018-12-20

## 2018-12-20 ENCOUNTER — HOSPITAL ENCOUNTER (EMERGENCY)
Facility: HOSPITAL | Age: 28
Discharge: HOME | End: 2018-12-20
Attending: EMERGENCY MEDICINE | Admitting: EMERGENCY MEDICINE

## 2018-12-20 ENCOUNTER — APPOINTMENT (OUTPATIENT)
Dept: CT IMAGING | Facility: HOSPITAL | Age: 28
End: 2018-12-20

## 2018-12-20 VITALS
HEIGHT: 69 IN | OXYGEN SATURATION: 99 % | BODY MASS INDEX: 25.11 KG/M2 | SYSTOLIC BLOOD PRESSURE: 131 MMHG | TEMPERATURE: 98 F | DIASTOLIC BLOOD PRESSURE: 78 MMHG | RESPIRATION RATE: 15 BRPM | HEART RATE: 78 BPM | WEIGHT: 169.56 LBS

## 2018-12-20 DIAGNOSIS — F12.90 CANNABINOID HYPEREMESIS SYNDROME: Primary | ICD-10-CM

## 2018-12-20 DIAGNOSIS — R11.2 CANNABINOID HYPEREMESIS SYNDROME: Primary | ICD-10-CM

## 2018-12-20 LAB
ALBUMIN SERPL-MCNC: 4.8 G/DL (ref 3.5–5)
ALBUMIN/GLOB SERPL: 1.2 G/DL (ref 1.1–2.5)
ALP SERPL-CCNC: 61 U/L (ref 24–120)
ALT SERPL W P-5'-P-CCNC: 57 U/L (ref 0–54)
AMPHET+METHAMPHET UR QL: NEGATIVE
ANION GAP SERPL CALCULATED.3IONS-SCNC: 12 MMOL/L (ref 4–13)
AST SERPL-CCNC: 52 U/L (ref 7–45)
BARBITURATES UR QL SCN: NEGATIVE
BASOPHILS # BLD AUTO: 0.04 10*3/MM3 (ref 0–0.2)
BASOPHILS NFR BLD AUTO: 0.6 % (ref 0–2)
BENZODIAZ UR QL SCN: NEGATIVE
BILIRUB SERPL-MCNC: 0.9 MG/DL (ref 0.1–1)
BUN BLD-MCNC: 6 MG/DL (ref 5–21)
BUN/CREAT SERPL: 6.5 (ref 7–25)
CALCIUM SPEC-SCNC: 9.5 MG/DL (ref 8.4–10.4)
CANNABINOIDS SERPL QL: POSITIVE
CHLORIDE SERPL-SCNC: 98 MMOL/L (ref 98–110)
CO2 SERPL-SCNC: 30 MMOL/L (ref 24–31)
COCAINE UR QL: NEGATIVE
CREAT BLD-MCNC: 0.93 MG/DL (ref 0.5–1.4)
CRP SERPL-MCNC: <0.5 MG/DL (ref 0–0.99)
D-LACTATE SERPL-SCNC: 1.5 MMOL/L (ref 0.5–2)
DEPRECATED RDW RBC AUTO: 37.7 FL (ref 40–54)
DEVELOPER EXPIRATION DATE: NORMAL
DEVELOPER LOT NUMBER: 139
EOSINOPHIL # BLD AUTO: 0.07 10*3/MM3 (ref 0–0.7)
EOSINOPHIL NFR BLD AUTO: 1.1 % (ref 0–4)
ERYTHROCYTE [DISTWIDTH] IN BLOOD BY AUTOMATED COUNT: 12.3 % (ref 12–15)
EXPIRATION DATE: NORMAL
FECAL OCCULT BLOOD SCREEN, POC: NEGATIVE
GFR SERPL CREATININE-BSD FRML MDRD: 116 ML/MIN/1.73
GLOBULIN UR ELPH-MCNC: 3.9 GM/DL
GLUCOSE BLD-MCNC: 120 MG/DL (ref 70–100)
HCT VFR BLD AUTO: 42.5 % (ref 40–52)
HGB BLD-MCNC: 13.8 G/DL (ref 14–18)
HOLD SPECIMEN: NORMAL
IMM GRANULOCYTES # BLD: 0.03 10*3/MM3 (ref 0–0.03)
IMM GRANULOCYTES NFR BLD: 0.5 % (ref 0–5)
LIPASE SERPL-CCNC: 22 U/L (ref 23–203)
LYMPHOCYTES # BLD AUTO: 0.94 10*3/MM3 (ref 0.72–4.86)
LYMPHOCYTES NFR BLD AUTO: 14.6 % (ref 15–45)
Lab: 139
MCH RBC QN AUTO: 27.1 PG (ref 28–32)
MCHC RBC AUTO-ENTMCNC: 32.5 G/DL (ref 33–36)
MCV RBC AUTO: 83.5 FL (ref 82–95)
METHADONE UR QL SCN: NEGATIVE
MONOCYTES # BLD AUTO: 0.3 10*3/MM3 (ref 0.19–1.3)
MONOCYTES NFR BLD AUTO: 4.6 % (ref 4–12)
NEGATIVE CONTROL: NEGATIVE
NEUTROPHILS # BLD AUTO: 5.08 10*3/MM3 (ref 1.87–8.4)
NEUTROPHILS NFR BLD AUTO: 78.6 % (ref 39–78)
NRBC BLD MANUAL-RTO: 0 /100 WBC (ref 0–0)
OPIATES UR QL: NEGATIVE
PCP UR QL SCN: NEGATIVE
PLATELET # BLD AUTO: 292 10*3/MM3 (ref 130–400)
PMV BLD AUTO: 9.7 FL (ref 6–12)
POSITIVE CONTROL: POSITIVE
POTASSIUM BLD-SCNC: 4.1 MMOL/L (ref 3.5–5.3)
PROCALCITONIN SERPL-MCNC: <0.25 NG/ML
PROT SERPL-MCNC: 8.7 G/DL (ref 6.3–8.7)
RBC # BLD AUTO: 5.09 10*6/MM3 (ref 4.8–5.9)
SODIUM BLD-SCNC: 140 MMOL/L (ref 135–145)
WBC NRBC COR # BLD: 6.46 10*3/MM3 (ref 4.8–10.8)
WHOLE BLOOD HOLD SPECIMEN: NORMAL

## 2018-12-20 PROCEDURE — 99284 EMERGENCY DEPT VISIT MOD MDM: CPT

## 2018-12-20 PROCEDURE — 80307 DRUG TEST PRSMV CHEM ANLYZR: CPT | Performed by: EMERGENCY MEDICINE

## 2018-12-20 PROCEDURE — 25010000002 ONDANSETRON PER 1 MG: Performed by: EMERGENCY MEDICINE

## 2018-12-20 PROCEDURE — 36415 COLL VENOUS BLD VENIPUNCTURE: CPT

## 2018-12-20 PROCEDURE — 80053 COMPREHEN METABOLIC PANEL: CPT | Performed by: EMERGENCY MEDICINE

## 2018-12-20 PROCEDURE — 83690 ASSAY OF LIPASE: CPT | Performed by: EMERGENCY MEDICINE

## 2018-12-20 PROCEDURE — 85025 COMPLETE CBC W/AUTO DIFF WBC: CPT | Performed by: EMERGENCY MEDICINE

## 2018-12-20 PROCEDURE — 83605 ASSAY OF LACTIC ACID: CPT | Performed by: EMERGENCY MEDICINE

## 2018-12-20 PROCEDURE — 82270 OCCULT BLOOD FECES: CPT | Performed by: EMERGENCY MEDICINE

## 2018-12-20 PROCEDURE — 84145 PROCALCITONIN (PCT): CPT | Performed by: EMERGENCY MEDICINE

## 2018-12-20 PROCEDURE — 74177 CT ABD & PELVIS W/CONTRAST: CPT

## 2018-12-20 PROCEDURE — 96374 THER/PROPH/DIAG INJ IV PUSH: CPT

## 2018-12-20 PROCEDURE — 0 IOPAMIDOL PER 1 ML: Performed by: EMERGENCY MEDICINE

## 2018-12-20 PROCEDURE — 86140 C-REACTIVE PROTEIN: CPT | Performed by: EMERGENCY MEDICINE

## 2018-12-20 PROCEDURE — 74018 RADEX ABDOMEN 1 VIEW: CPT

## 2018-12-20 RX ORDER — ONDANSETRON 2 MG/ML
4 INJECTION INTRAMUSCULAR; INTRAVENOUS ONCE
Status: COMPLETED | OUTPATIENT
Start: 2018-12-20 | End: 2018-12-20

## 2018-12-20 RX ORDER — ONDANSETRON 4 MG/1
4 TABLET, FILM COATED ORAL EVERY 6 HOURS
Qty: 15 TABLET | Refills: 0 | OUTPATIENT
Start: 2018-12-20 | End: 2021-08-02

## 2018-12-20 RX ADMIN — IOPAMIDOL 100 ML: 755 INJECTION, SOLUTION INTRAVENOUS at 10:43

## 2018-12-20 RX ADMIN — ONDANSETRON 4 MG: 2 INJECTION INTRAMUSCULAR; INTRAVENOUS at 08:53

## 2018-12-20 NOTE — DISCHARGE INSTRUCTIONS
Cannabis Use Disorder  Cannabis use disorder is when using marijuana disrupts a person's daily life or causes health problems. This condition can be dangerous. The health problems this condition can cause include:  · Long-lasting problems with thinking and learning. These can be permanent in young people.  · Severe anxiety.  · Paranoia.  · Hallucinations.  · Dangerously high blood pressure and heart rate.  · Schizophrenia.  · Breathing problems.  · Problems with child development during and after pregnancy.    People with this condition are also more likely to use other drugs.  What are the causes?  This condition is caused by using marijuana too much over time. It is not caused by using it only once in a while. Many people with this condition use marijuana because it gives them a feeling of extreme pleasure or relaxation.  What increases the risk?  This condition is more likely to develop in:  · Men.  · People with a family history of cannabis use disorder.  · People with mental health issues such as depression or post-traumatic stress disorder.    What are the signs or symptoms?  Symptoms of this condition include:  · Using greater amounts of marijuana than you want to, or using marijuana for longer than you want to.  · Craving marijuana.  · Spending a lot of time getting marijuana and using it or recovering from its effects.  · Having problems at work, at school, at home, or in relationships because of marijuana use.  · Giving up or cutting down on important life activities because of marijuana use.  · Using marijuana at times when it is dangerous, such as while you are driving a car.  · Needing more and more marijuana to get the same effect you want from the marijuana (building up a tolerance).  · Physical problems, such as:  ? A long-lasting cough.  ? Bronchitis.  ? Emphysema.  ? Throat and lung cancer.  · Mental problems, such as:  ? Psychosis.  ? Anxiety.  ? Trouble sleeping.  · Having symptoms of withdrawal  when you stop using marijuana. Symptom of withdrawal include:  ? Irritability or anger.  ? Anxiety or restlessness.  ? Trouble sleeping.  ? Loss of appetite or weight loss.  ? Aches and pains.  ? Shakiness, sweating, or chills.    How is this diagnosed?  This condition is diagnosed with an assessment. During the assessment, your health care provider will ask about your marijuana use and about how it affects your life. You will be diagnosed with the condition if you have had at least two symptoms of this condition within a 12-month period. How severe the condition is depends on how many symptoms you have.  · If you have two to three symptoms, your condition is mild.  · If you have four to five symptoms, your condition is moderate.  · If you have six or more symptoms, your condition is severe.    Your health care provider may perform a physical exam or do lab tests to see if you have physical problems resulting from marijuana use. Your health care provider may also screen for drug use and refer you to a mental health professional for evaluation.  How is this treated?  Treatment for this condition is usually provided by mental health professionals with training in substance use disorders. Your treatment may involve:  · Counseling. This treatment is also called talk therapy. It is provided by substance use treatment counselors. A counselor can address the reasons you use marijuana and suggest ways to keep you from using it again. The goals of talk therapy are to:  ? Find healthy activities to replace using marijuana.  ? Identify and avoid the things that trigger your marijuana use.  ? Help you learn how to handle cravings.  · Support groups. Support groups are led by people who have quit using marijuana. They provide emotional support, advice, and guidance.  · Medicine. Medicine is used to treat mental health issues that trigger marijuana use or that result from it.    Follow these instructions at home:  · Take  over-the-counter and prescription medicines only as told by your health care provider.  · Check with your health care provider before starting any new medicines.  · Keep all follow-up visits as told by your health care provider. This is important.  · Work with your counselor or group to develop tools to keep you from using marijuana again (relapsing).  · Make healthy lifestyle choices, such as:  ? Eating a healthy diet.  ? Getting enough exercise.  ? Improving your stress-management skills.  · Learn daily living skills and work skills.  Where to find more information:  · National [place] on Drug Abuse: www.drugabuse.gov  · Substance Abuse and Mental Health Services Administration: www.samhsa.gov  Contact a health care provider if:  · You are not able to take your medicines as told.  · Your symptoms get worse.  Get help right away if:  · You have serious thoughts about hurting yourself or others.  If you ever feel like you may hurt yourself or others, or have thoughts about taking your own life, get help right away. You can go to your nearest emergency department or call:  · Your local emergency services (911 in the [place]  · A suicide crisis helpline, such as the National Suicide Prevention Lifeline at 1-542.795.2149. This is open 24 hours a day.    This information is not intended to replace advice given to you by your health care provider. Make sure you discuss any questions you have with your health care provider.  Document Released: 12/15/2001 Document Revised: 09/15/2017 Document Reviewed: 09/15/2017  [employer] Interactive Patient Education © 2018 [employer]

## 2018-12-28 ENCOUNTER — OFFICE VISIT (OUTPATIENT)
Dept: INTERNAL MEDICINE | Facility: CLINIC | Age: 28
End: 2018-12-28

## 2018-12-28 VITALS
BODY MASS INDEX: 25.04 KG/M2 | TEMPERATURE: 98 F | OXYGEN SATURATION: 99 % | WEIGHT: 169.1 LBS | DIASTOLIC BLOOD PRESSURE: 91 MMHG | SYSTOLIC BLOOD PRESSURE: 135 MMHG | RESPIRATION RATE: 16 BRPM | HEIGHT: 69 IN | HEART RATE: 67 BPM

## 2018-12-28 DIAGNOSIS — K62.5 RECTAL BLEEDING: Primary | ICD-10-CM

## 2018-12-28 DIAGNOSIS — M51.36 BULGE OF LUMBAR DISC WITHOUT MYELOPATHY: ICD-10-CM

## 2018-12-28 DIAGNOSIS — F12.90 MARIJUANA USE, EPISODIC: ICD-10-CM

## 2018-12-28 DIAGNOSIS — D64.9 ANEMIA, UNSPECIFIED TYPE: ICD-10-CM

## 2018-12-28 PROCEDURE — 99214 OFFICE O/P EST MOD 30 MIN: CPT | Performed by: FAMILY MEDICINE

## 2018-12-28 NOTE — PATIENT INSTRUCTIONS
I think that you have hemorrhoids, as long as you are eating plenty of fiber and staying hydrated I think that it will get better. If not, we can send you to a GI doctor.     Lets check your blood count in about 6 weeks to make sure you are not losing more blood through your GI tract.     I would try stretching for your back before and after basketball, and at least twice daily. If no better, see me back for OMT. I did send you to physical therapy today.    What is Osteopathic Medicine  Osteopathic medicine provides all of the benefits of modern medicine including prescription drugs, surgery, and the use of technology to diagnose disease and evaluate injury. It also offers the added benefit of hands-on diagnosis and treatment through a system of therapy known as osteopathic manipulative medicine. Osteopathic medicine emphasizes helping each person achieve a high level of wellness by focusing on health promotion and disease prevention. (AACOM.ORG)     What is a DO  Doctor's of Osteopathy (DO) are fully trained physicians, capable of practicing the entire scope of medicine. In addition to conventional medical practice, DO’s are trained to use their hands to palpate (feel) tissue function and dysfunction. Gentle manipulative techniques are applied, restoring optimal function (motion).     4 Principles define Osteopathic Medicine  • The body is a fully integrated being of body, mind and spirit  • The body is capable of self-regulation, self-healing, and health maintenance  • Structure and function are interrelated  • Rational treatment is based upon an understanding of the basic principles of body unity, self-regulation, and the relationship of structure and function     Osteopathic Manipulative Medicine (OMM)   OMM encompasses a wide range of techniques addressing problems in joints, ligaments, muscles, tendons, and fascia (tissue surrounding muscles and other organs) that may cause pain or interfere with the body’s  function. When there are restrictions within the structure of the body it does not function properly, often times causing pain. Using different techniques (listed below) the restrictions in the body are relieved so the body can function at optimal health. Patients often experience a sense of deep relaxation, tingling, fluid flows and relief of pain. These changes may be experienced immediately as they occur or later after the treatment. OMM may be referred to as Osteopathic Manipulative Treatment (OMT).     Common Treatment Modalities  Osteopathy in the Cranial Field- a system of treatment that utilizes the intrinsic motion of the cranial and neurological system to treat the whole body     Myofascial Release - used to treat restrictions of muscle and fascia     Counterstrain - focused on specific tender points on the body that are held in a position of comfort for 90 seconds, after which the tenderness is relieved     Muscle Energy - uses the relaxation after a muscle is contracted to stretch muscles and increase range of motion     Balanced Ligamentous Tension - ligaments or joints are placed into a state of balanced until the tension is relieved     Facilitated Positional Release - patient’s spine is placed at neutral position while the isolated segment for treatment is placed at ease. Compression or traction is then added to release the tension in the muscle, fascia, and/or joints     High Velocity Low Amplitude (HVLA)- use of fast, short thrusts through restricted joints; a technique with which most people are familiar (also known as the “cracking” or “popping” technique)     Who would benefit  Osteopathy treats the patient, not the disease. Our intention is to find and restore health as well as structural integrity and fluid continuity.      Some of the problems that typically respond to osteopathic treatment:  · SOMATIC PAIN  · Back, neck, and joint pain  · Sciatica  · Headaches/Migraines  · Temporal  Mandibular Joint Dysfunction (TMJ)     · TRAUMATIC INJURIES  · Head trauma  · Post Concussion Syndrome  · Overuse Syndromes  · Whiplash Syndromes     · CHRONIC CONDITIONS UNRESPONSIVE TO CONVENTIAL TREATMENT  · Neurologic disorders  · Gastrointestinal disorders  · Genitourinary disorders  · Respiratory Disorders     · WOMEN DURING PREGNANCY can be made more comfortable, their labor and delivery eased considerably by providing freedom to the ligamentous support of the uterus and pelvis.     ADDITIONAL RESOURCES  www.osteopathic.org  www.[employer]  http://[email or web address]-[employer]izer.com/aboutosteopathy/research/ (Research articles)  Book: Dr. Mendoza’s Touch of Life by Francis Mendoza DO     Information gathered from [employer],  aacom.org, A Brief Guide to Osteopathic Medicine.

## 2018-12-28 NOTE — ASSESSMENT & PLAN NOTE
Suspect internal hemorrhoids, very mild anemia on recent CBC, will repeat in 6 weeks. Recommend hydration, high fiber diet. F/u PRN

## 2018-12-28 NOTE — PROGRESS NOTES
"CC:   Chief Complaint   Patient presents with   • Rectal Bleeding       History:  Deangelo Hall is a 28 y.o. male who presents today for follow-up for evaluation of the above:    Rectal bleeding only twice since last visit, no constipation, minimal amount of blood in water and on toilet paper. No pain. Great grandmother had colon cancer in her 60s.    Was seen in the ED recently for hyperemesis due to marijuana. At that time CBC was 13.8, normocytic.     Was drinking most days prior to moving here, now rare use, had one beer prior to ED presentation. Occasional marijuana use.     Also reports that he still having low back pain, feels like his back is tight with little motion following exercise.  Currently not in a stretching regimen.    ROS:  Review of Systems   Constitutional: Negative for fatigue.   Gastrointestinal: Positive for blood in stool. Negative for abdominal pain.   Psychiatric/Behavioral: Sleep disturbance: better.     Mr. Hall  reports that  has never smoked. he has never used smokeless tobacco. Drug use questions deferred to the physician. He reports that he does not drink alcohol.      Current Outpatient Medications:   •  cyclobenzaprine (FLEXERIL) 10 MG tablet, Take 1 tablet by mouth 2 (Two) Times a Day As Needed for Muscle Spasms., Disp: 90 tablet, Rfl: 1  •  ondansetron (ZOFRAN) 4 MG tablet, Take 1 tablet by mouth Every 6 (Six) Hours., Disp: 15 tablet, Rfl: 0  •  hydrOXYzine (ATARAX) 25 MG tablet, Take 1 tablet by mouth At Night As Needed (insomnia)., Disp: 30 tablet, Rfl: 1  •  meloxicam (MOBIC) 7.5 MG tablet, Take 1 tablet by mouth Daily., Disp: 15 tablet, Rfl: 0    OBJECTIVE:  /91 (BP Location: Left arm, Patient Position: Sitting)   Pulse 67   Temp 98 °F (36.7 °C) (Oral)   Resp 16   Ht 175.3 cm (69\")   Wt 76.7 kg (169 lb 1.6 oz)   SpO2 99%   BMI 24.97 kg/m²    Physical Exam   Constitutional: He is oriented to person, place, and time. No distress.   HENT:   Head: Normocephalic " and atraumatic.   Nose: Nose normal.   Eyes: Conjunctivae are normal. Right eye exhibits no discharge. Left eye exhibits no discharge. No scleral icterus.   Neck: No tracheal deviation present.   Pulmonary/Chest: Effort normal.   Abdominal:   Normal bowel sounds, mild periumbilical pain without rebound   Neurological: He is alert and oriented to person, place, and time.   Skin: Skin is warm and dry. He is not diaphoretic. No pallor.   Psychiatric: He has a normal mood and affect. His behavior is normal. Judgment and thought content normal.   Nursing note and vitals reviewed.    Assessment/Plan    Problem List Items Addressed This Visit     Bulge of lumbar disc without myelopathy     Recommend stretching  PT referral  F/u for OMT         Relevant Orders    Ambulatory Referral to Physical Therapy Evaluate and treat    Rectal bleeding - Primary     Suspect internal hemorrhoids, very mild anemia on recent CBC, will repeat in 6 weeks. Recommend hydration, high fiber diet. F/u PRN         Relevant Orders    CBC No Differential    Marijuana use, episodic     Counseled pt on hyperemesis with use         Anemia     Mild, normocytic, has had occasional rectal bleeding, with recent Dx of MS concerning for anemia of chronic disease. Repeat in 6 weeks             Patient's Body mass index is 24.97 kg/m². BMI is within normal parameters. No follow-up required.      An After Visit Summary was printed and given to the patient at discharge.  Return if symptoms worsen or fail to improve. Sooner if problems arise.         Ildefonso Lawler D.O.  Family Medicine  Osteopathic Neuromusculoskeletal Medicine

## 2019-01-16 ENCOUNTER — LAB (OUTPATIENT)
Dept: LAB | Facility: HOSPITAL | Age: 29
End: 2019-01-16
Attending: PSYCHIATRY & NEUROLOGY

## 2019-01-16 ENCOUNTER — OFFICE VISIT (OUTPATIENT)
Dept: NEUROLOGY | Facility: CLINIC | Age: 29
End: 2019-01-16

## 2019-01-16 VITALS
RESPIRATION RATE: 18 BRPM | SYSTOLIC BLOOD PRESSURE: 130 MMHG | HEART RATE: 80 BPM | WEIGHT: 174 LBS | BODY MASS INDEX: 25.77 KG/M2 | HEIGHT: 69 IN | DIASTOLIC BLOOD PRESSURE: 80 MMHG

## 2019-01-16 DIAGNOSIS — G35 MS (MULTIPLE SCLEROSIS) (HCC): ICD-10-CM

## 2019-01-16 DIAGNOSIS — G35 MS (MULTIPLE SCLEROSIS) (HCC): Primary | ICD-10-CM

## 2019-01-16 DIAGNOSIS — K62.5 RECTAL BLEEDING: ICD-10-CM

## 2019-01-16 LAB
DEPRECATED RDW RBC AUTO: 37.5 FL (ref 40–54)
ERYTHROCYTE [DISTWIDTH] IN BLOOD BY AUTOMATED COUNT: 12.3 % (ref 12–15)
HCT VFR BLD AUTO: 42.7 % (ref 40–52)
HGB BLD-MCNC: 13.7 G/DL (ref 14–18)
HIV1+2 AB SER QL: NEGATIVE
MCH RBC QN AUTO: 26.6 PG (ref 28–32)
MCHC RBC AUTO-ENTMCNC: 32.1 G/DL (ref 33–36)
MCV RBC AUTO: 82.9 FL (ref 82–95)
PLATELET # BLD AUTO: 268 10*3/MM3 (ref 130–400)
PMV BLD AUTO: 9.7 FL (ref 6–12)
RBC # BLD AUTO: 5.15 10*6/MM3 (ref 4.8–5.9)
WBC NRBC COR # BLD: 6.45 10*3/MM3 (ref 4.8–10.8)

## 2019-01-16 PROCEDURE — 86480 TB TEST CELL IMMUN MEASURE: CPT | Performed by: PSYCHIATRY & NEUROLOGY

## 2019-01-16 PROCEDURE — 36415 COLL VENOUS BLD VENIPUNCTURE: CPT

## 2019-01-16 PROCEDURE — 85027 COMPLETE CBC AUTOMATED: CPT | Performed by: FAMILY MEDICINE

## 2019-01-16 PROCEDURE — 99215 OFFICE O/P EST HI 40 MIN: CPT | Performed by: PSYCHIATRY & NEUROLOGY

## 2019-01-16 PROCEDURE — G0432 EIA HIV-1/HIV-2 SCREEN: HCPCS | Performed by: PSYCHIATRY & NEUROLOGY

## 2019-01-16 RX ORDER — MELATONIN
1000 DAILY
COMMUNITY
End: 2019-02-21 | Stop reason: SDUPTHER

## 2019-01-16 NOTE — PROGRESS NOTES
Subjective   Deangelo Hall, 1990, is a male who is being seen today for   Chief Complaint   Patient presents with   • Multiple Sclerosis       HISTORY OF PRESENT ILLNESS: Extended follow-up.  Patient started having in July 2018 episode of dizziness and left-sided numbness arm and leg.  He was fatigued and weak.  Patient eventually was seen to have abnormalities on the brain MRI suggestive of possible MS and abnormalities in the cervical spinal cord with nonenhancing lesion.  This was at C2 and below.  Patient had thoracic spine abnormality at T10- 11 in the spinal cord as well.  No definite abnormalities appreciated and lumbar spine.  Patient sometimes when he turns his neck gets a sharp pain in the left neck area but no electrical shock feeling.  Patient has positive THA virus to a titer of 2.89.  Patient actually has less symptomatic now than last few months.  Patient said that when he was having the significant problems in his left side numbness was particularly noticeable when he went to check for sensation with ice on the left side and it did not feel the same as on the right.  Patient still says he has some mild numbness in his left upper extremity feeling different than on the right.  His vitamin D level was low and he is on vitamin D supplement.  Patient is noticing intermittently some left index finger tremor.  EEG was normal.    REVIEW OF SYSTEMS:   GENERAL:Blood pressure 130/80 left arm seated  PULMONARY: No acute respiratory difficulties  CVS:  No chest pain or palpitation  GASTROINTESTINAL: No acute GI distress  GENITOURINARY: No acute  distress  GYN: Not applicable  MUSCULOSKELETAL: Right shoulder pain status post old basketball injury  HEENT:  No acute vision change.  Patient is deaf and evaluation was done through an  [id number]/Maikel was signing.  ENDOCRINE:  No acute endocrine symptoms  PSYCHIATRIC: No acute psychiatric symptoms  HEMATOLOGY: No anemia  SKIN: No skin changes  Family  history reviewed and otherwise noncontributory  Patient denies any  smoking or alcohol use.  Apparently the patient has used marijuana in the past    PHYSICAL EXAMINATION:    GENERAL: No acute distress  CRANIUM: Normocephalic/atraumatic  HEENT: No acute fundic abnormalities.  Pupils equal round reactive to light.       EYES:EOMs intact without nystagmus and fields full to confrontation       EARS:  Tympanic membrane on the right obscured by wax and partially obscured on the left.       THROAT:No oropharynx abnormalities   NECK:  No bruits/no lymphadenopathy  CHEST:No acute cardiopulmonary abnormalities by auscultation   ABDOMEN: NONDISTENDED   EXTREMITIES: pulses symmetrical   NEURO:Patient alert and follows commands without difficulty   SPEECH:  normal    CRANIAL NERVES:  motor sensory about the face normal and symmetric    MOTOR STRENGTH:  fine and gross motor strength is normal upper and lower extremities  STATION AND GAIT: gait normal/Romberg negative  CEREBELLAR:  finger-nose and heel shin normal  SENSORY:  patient has some pin sensation difference left arm versus right but face and leg are symmetric.  Vibration is symmetric in the lower extremity.  There is fairly symmetric vibration in the upper extremities  REFLEXES:Reflexes are present and symmetric upper and lower extremity without Babinski's/clonus      ASSESSMENT AND PLAN: patient with probable MS relapsing remitting.  Patient needs to be started on medication to reduce the chance of relapse.  I discussed various medications with the patient and his girlfriend.  Patient gave permission to get HIV testing and we are also doing TB testing.  Patient is to see MS specialist at [facility] and 2 weeks.  They are going to decide what type medication patient is going to start at that time.  I discussed the fact that the patient has some increase in titer of the THA virus and that may play a factor choosing the medication.  I might recommend that the  patient starts Rebif but they will review this with the MS specialist.  I spent 40 minutes with this patient with 30 minutes counseling.  I discussed with patient and girlfriend that if patient has an exacerbation to get to emergency room immediately.Patient's Body mass index is 25.7 kg/m². BMI is within normal parameters. No follow-up required..      Deangelo was seen today for multiple sclerosis.    Diagnoses and all orders for this visit:    MS (multiple sclerosis) (CMS/[facility])  -     QuantiFERON TB Gold; Future  -     HIV-1 & HIV-2 Antibodies; Future    Other orders  -     Cancel: TB Skin Test

## 2019-01-19 LAB
QUANTIFERON CRITERIA: NORMAL
QUANTIFERON MITOGEN VALUE: >10 IU/ML
QUANTIFERON NIL VALUE: 0.07 IU/ML
QUANTIFERON TB1 AG VALUE: 0.35 IU/ML
QUANTIFERON TB2 AG VALUE: 0.26 IU/ML
QUANTIFERON-TB GOLD PLUS: NEGATIVE

## 2019-01-20 ENCOUNTER — HOSPITAL ENCOUNTER (OUTPATIENT)
Age: 29
Setting detail: OBSERVATION
Discharge: PSYCHIATRIC OR SUBSTANCE USE FACILITY | End: 2019-01-21
Attending: EMERGENCY MEDICINE | Admitting: FAMILY MEDICINE
Payer: MEDICAID

## 2019-01-20 DIAGNOSIS — R46.89 AGGRESSIVE BEHAVIOR: ICD-10-CM

## 2019-01-20 DIAGNOSIS — T50.902A INTENTIONAL DRUG OVERDOSE, INITIAL ENCOUNTER (HCC): Primary | ICD-10-CM

## 2019-01-20 LAB
ACETAMINOPHEN LEVEL: <15 UG/ML
ALBUMIN SERPL-MCNC: 4.8 G/DL (ref 3.5–5.2)
ALP BLD-CCNC: 67 U/L (ref 40–130)
ALT SERPL-CCNC: 33 U/L (ref 5–41)
AMPHETAMINE SCREEN, URINE: NEGATIVE
ANION GAP SERPL CALCULATED.3IONS-SCNC: 10 MMOL/L (ref 7–19)
AST SERPL-CCNC: 32 U/L (ref 5–40)
BARBITURATE SCREEN URINE: NEGATIVE
BASOPHILS ABSOLUTE: 0 K/UL (ref 0–0.2)
BASOPHILS RELATIVE PERCENT: 0.7 % (ref 0–1)
BENZODIAZEPINE SCREEN, URINE: NEGATIVE
BILIRUB SERPL-MCNC: 0.6 MG/DL (ref 0.2–1.2)
BILIRUBIN URINE: NEGATIVE
BLOOD, URINE: NEGATIVE
BUN BLDV-MCNC: 7 MG/DL (ref 6–20)
CALCIUM SERPL-MCNC: 9.4 MG/DL (ref 8.6–10)
CANNABINOID SCREEN URINE: POSITIVE
CHLORIDE BLD-SCNC: 101 MMOL/L (ref 98–111)
CLARITY: CLEAR
CO2: 28 MMOL/L (ref 22–29)
COCAINE METABOLITE SCREEN URINE: NEGATIVE
COLOR: YELLOW
CREAT SERPL-MCNC: 1 MG/DL (ref 0.5–1.2)
EOSINOPHILS ABSOLUTE: 0 K/UL (ref 0–0.6)
EOSINOPHILS RELATIVE PERCENT: 0.5 % (ref 0–5)
ETHANOL: <10 MG/DL (ref 0–0.08)
GFR NON-AFRICAN AMERICAN: >60
GLUCOSE BLD-MCNC: 121 MG/DL (ref 74–109)
GLUCOSE URINE: NEGATIVE MG/DL
HCT VFR BLD CALC: 45.7 % (ref 42–52)
HEMOGLOBIN: 14.5 G/DL (ref 14–18)
KETONES, URINE: NEGATIVE MG/DL
LEUKOCYTE ESTERASE, URINE: NEGATIVE
LYMPHOCYTES ABSOLUTE: 1.5 K/UL (ref 1.1–4.5)
LYMPHOCYTES RELATIVE PERCENT: 25.2 % (ref 20–40)
Lab: ABNORMAL
MCH RBC QN AUTO: 26.8 PG (ref 27–31)
MCHC RBC AUTO-ENTMCNC: 31.7 G/DL (ref 33–37)
MCV RBC AUTO: 84.3 FL (ref 80–94)
MONOCYTES ABSOLUTE: 0.5 K/UL (ref 0–0.9)
MONOCYTES RELATIVE PERCENT: 7.6 % (ref 0–10)
NEUTROPHILS ABSOLUTE: 4 K/UL (ref 1.5–7.5)
NEUTROPHILS RELATIVE PERCENT: 65.7 % (ref 50–65)
NITRITE, URINE: NEGATIVE
OPIATE SCREEN URINE: NEGATIVE
PDW BLD-RTO: 12.4 % (ref 11.5–14.5)
PH UA: 7.5
PLATELET # BLD: 248 K/UL (ref 130–400)
PMV BLD AUTO: 9.3 FL (ref 9.4–12.4)
POTASSIUM REFLEX MAGNESIUM: 3.7 MMOL/L (ref 3.5–5)
PROTEIN UA: ABNORMAL MG/DL
RBC # BLD: 5.42 M/UL (ref 4.7–6.1)
SALICYLATE, SERUM: <3 MG/DL (ref 3–10)
SODIUM BLD-SCNC: 139 MMOL/L (ref 136–145)
SPECIFIC GRAVITY UA: 1.02
TOTAL PROTEIN: 8 G/DL (ref 6.6–8.7)
URINE REFLEX TO CULTURE: ABNORMAL
UROBILINOGEN, URINE: 0.2 E.U./DL
WBC # BLD: 6 K/UL (ref 4.8–10.8)

## 2019-01-20 PROCEDURE — G0480 DRUG TEST DEF 1-7 CLASSES: HCPCS

## 2019-01-20 PROCEDURE — G0378 HOSPITAL OBSERVATION PER HR: HCPCS

## 2019-01-20 PROCEDURE — 80053 COMPREHEN METABOLIC PANEL: CPT

## 2019-01-20 PROCEDURE — 93005 ELECTROCARDIOGRAM TRACING: CPT

## 2019-01-20 PROCEDURE — 96372 THER/PROPH/DIAG INJ SC/IM: CPT

## 2019-01-20 PROCEDURE — 6360000002 HC RX W HCPCS: Performed by: EMERGENCY MEDICINE

## 2019-01-20 PROCEDURE — 36415 COLL VENOUS BLD VENIPUNCTURE: CPT

## 2019-01-20 PROCEDURE — 99285 EMERGENCY DEPT VISIT HI MDM: CPT

## 2019-01-20 PROCEDURE — 80307 DRUG TEST PRSMV CHEM ANLYZR: CPT

## 2019-01-20 PROCEDURE — 81003 URINALYSIS AUTO W/O SCOPE: CPT

## 2019-01-20 PROCEDURE — 2580000003 HC RX 258: Performed by: INTERNAL MEDICINE

## 2019-01-20 PROCEDURE — 85025 COMPLETE CBC W/AUTO DIFF WBC: CPT

## 2019-01-20 PROCEDURE — 99219 PR INITIAL OBSERVATION CARE/DAY 50 MINUTES: CPT | Performed by: INTERNAL MEDICINE

## 2019-01-20 PROCEDURE — 99285 EMERGENCY DEPT VISIT HI MDM: CPT | Performed by: EMERGENCY MEDICINE

## 2019-01-20 RX ORDER — ONDANSETRON 2 MG/ML
4 INJECTION INTRAMUSCULAR; INTRAVENOUS EVERY 6 HOURS PRN
Status: DISCONTINUED | OUTPATIENT
Start: 2019-01-20 | End: 2019-01-21 | Stop reason: HOSPADM

## 2019-01-20 RX ORDER — SODIUM CHLORIDE 0.9 % (FLUSH) 0.9 %
10 SYRINGE (ML) INJECTION EVERY 12 HOURS SCHEDULED
Status: DISCONTINUED | OUTPATIENT
Start: 2019-01-20 | End: 2019-01-21 | Stop reason: HOSPADM

## 2019-01-20 RX ORDER — ZIPRASIDONE MESYLATE 20 MG/ML
20 INJECTION, POWDER, LYOPHILIZED, FOR SOLUTION INTRAMUSCULAR ONCE
Status: DISCONTINUED | OUTPATIENT
Start: 2019-01-20 | End: 2019-01-20

## 2019-01-20 RX ORDER — SODIUM CHLORIDE 0.9 % (FLUSH) 0.9 %
10 SYRINGE (ML) INJECTION PRN
Status: DISCONTINUED | OUTPATIENT
Start: 2019-01-20 | End: 2019-01-21 | Stop reason: HOSPADM

## 2019-01-20 RX ORDER — HALOPERIDOL 5 MG/ML
5 INJECTION INTRAMUSCULAR EVERY 6 HOURS PRN
Status: DISCONTINUED | OUTPATIENT
Start: 2019-01-20 | End: 2019-01-21 | Stop reason: HOSPADM

## 2019-01-20 RX ORDER — ZIPRASIDONE MESYLATE 20 MG/ML
20 INJECTION, POWDER, LYOPHILIZED, FOR SOLUTION INTRAMUSCULAR ONCE
Status: COMPLETED | OUTPATIENT
Start: 2019-01-20 | End: 2019-01-20

## 2019-01-20 RX ADMIN — Medication 10 ML: at 20:44

## 2019-01-20 RX ADMIN — ZIPRASIDONE MESYLATE 20 MG: 20 INJECTION, POWDER, LYOPHILIZED, FOR SOLUTION INTRAMUSCULAR at 17:50

## 2019-01-20 ASSESSMENT — ENCOUNTER SYMPTOMS: INGESTION: 1

## 2019-01-21 VITALS
RESPIRATION RATE: 18 BRPM | DIASTOLIC BLOOD PRESSURE: 75 MMHG | BODY MASS INDEX: 22.9 KG/M2 | HEART RATE: 77 BPM | OXYGEN SATURATION: 100 % | HEIGHT: 70 IN | WEIGHT: 160 LBS | SYSTOLIC BLOOD PRESSURE: 138 MMHG | TEMPERATURE: 98.6 F

## 2019-01-21 LAB
EKG P AXIS: 67 DEGREES
EKG P-R INTERVAL: 212 MS
EKG Q-T INTERVAL: 368 MS
EKG QRS DURATION: 84 MS
EKG QTC CALCULATION (BAZETT): 390 MS
EKG T AXIS: 29 DEGREES

## 2019-01-21 PROCEDURE — 99217 PR OBSERVATION CARE DISCHARGE MANAGEMENT: CPT | Performed by: FAMILY MEDICINE

## 2019-01-21 PROCEDURE — G0378 HOSPITAL OBSERVATION PER HR: HCPCS

## 2019-02-21 ENCOUNTER — OFFICE VISIT (OUTPATIENT)
Dept: NEUROLOGY | Facility: CLINIC | Age: 29
End: 2019-02-21

## 2019-02-21 VITALS
WEIGHT: 170 LBS | RESPIRATION RATE: 18 BRPM | HEART RATE: 78 BPM | HEIGHT: 69 IN | DIASTOLIC BLOOD PRESSURE: 72 MMHG | SYSTOLIC BLOOD PRESSURE: 124 MMHG | BODY MASS INDEX: 25.18 KG/M2

## 2019-02-21 DIAGNOSIS — Z51.81 THERAPEUTIC DRUG MONITORING: Primary | ICD-10-CM

## 2019-02-21 DIAGNOSIS — E55.9 VITAMIN D DEFICIENCY: ICD-10-CM

## 2019-02-21 DIAGNOSIS — H91.90 DEAFNESS, UNSPECIFIED LATERALITY: ICD-10-CM

## 2019-02-21 DIAGNOSIS — M54.42 ACUTE LEFT-SIDED LOW BACK PAIN WITH LEFT-SIDED SCIATICA: ICD-10-CM

## 2019-02-21 DIAGNOSIS — I34.0 TRACE MITRAL VALVE REGURGITATION: ICD-10-CM

## 2019-02-21 DIAGNOSIS — F12.90 MARIJUANA USE, EPISODIC: ICD-10-CM

## 2019-02-21 DIAGNOSIS — G35 MULTIPLE SCLEROSIS (HCC): ICD-10-CM

## 2019-02-21 DIAGNOSIS — R00.2 PALPITATIONS: ICD-10-CM

## 2019-02-21 DIAGNOSIS — M51.36 BULGE OF LUMBAR DISC WITHOUT MYELOPATHY: ICD-10-CM

## 2019-02-21 PROCEDURE — 99214 OFFICE O/P EST MOD 30 MIN: CPT | Performed by: NURSE PRACTITIONER

## 2019-02-21 RX ORDER — MELATONIN
1000 DAILY
Qty: 30 TABLET | Refills: 2 | Status: SHIPPED | OUTPATIENT
Start: 2019-02-21 | End: 2022-10-04 | Stop reason: SDDI

## 2019-02-21 NOTE — PATIENT INSTRUCTIONS
Multiple Sclerosis  Multiple sclerosis (MS) is a disease of the central nervous system. It leads to the loss of the insulating covering of the nerves (myelin sheath) of your brain. When this happens, brain signals do not get sent properly or may not get sent at all. The age of onset of MS varies.  What are the causes?  The cause of MS is unknown. However, it is more common in the [place] than in the southern [place].  What increases the risk?  There is a higher number of women with MS than men. MS is not an illness that is passed down to you from your family members (inherited). However, your risk of MS is higher if you have a relative with MS.  What are the signs or symptoms?  The symptoms of MS occur in episodes or attacks. These attacks may last weeks to months. There may be long periods of almost no symptoms between attacks. The symptoms of MS vary. This is because of the many different ways it affects the central nervous system. The main symptoms of MS include:  · Vision problems and eye pain.  · Numbness.  · Weakness.  · Inability to move your arms, hands, feet, or legs (paralysis).  · Balance problems.  · Tremors.    How is this diagnosed?  Your health care provider can diagnose MS with the help of imaging exams and lab tests. These may include specialized X-ray exams and spinal fluid tests. The best imaging exam to confirm a diagnosis of MS is an MRI.  How is this treated?  There is no known cure for MS, but there are medicines that can decrease the number and frequency of attacks. Steroids are often used for short-term relief. Physical and occupational therapy may also help. There are also many new alternative or complementary treatments available to help control the symptoms of MS. Ask your health care provider if any of these other options are right for you.  Follow these instructions at home:  · Take medicines as directed by your health care provider.  · Exercise as directed by your  health care provider.  Contact a health care provider if:  You begin to feel depressed.  Get help right away if:  · You develop paralysis.  · You have problems with bladder, bowel, or sexual function.  · You develop mental changes, such as forgetfulness or mood swings.  · You have a period of uncontrolled movements (seizure).  This information is not intended to replace advice given to you by your health care provider. Make sure you discuss any questions you have with your health care provider.  Document Released: 12/15/2001 Document Revised: 05/25/2017 Document Reviewed: 08/25/2014  Elsevier Interactive Patient Education © 2017 [employer]

## 2019-02-22 ENCOUNTER — APPOINTMENT (OUTPATIENT)
Dept: ONCOLOGY | Facility: HOSPITAL | Age: 29
End: 2019-02-22

## 2019-02-22 NOTE — PROGRESS NOTES
Subjective     Chief Complaint   Patient presents with   • Multiple Sclerosis       Deangelo Hall is a 28 y.o. male -American presents today with his girlfriend.  We did utilize hospital  service with ID #[id number] as well as ID #[id number] throughout visit.  He originally no showed his appointment this morning and then his girlfriend called stating that he was crying at work with lower extremity weakness and wanted to be worked in.  We did work him in for an appointment and he became quite agitated at his weight after he was worked in.  He does appear calm in the office visit currently.  We referred him to MS clinic in [place] at which point he was evaluated once.  He was actually scheduled for his first infusion of Ranger Malik but no showed that appointment.  His girlfriend and patient relate that they never knew they had an appointment.  He continues to complain of incoordination to his lower extremities as well as feeling like his brain and movements are uncoordinated.  He denies any visual loss, double vision, facial weakness, or weakness.  Denies any arm incoordination.  Does complain of numbness starting at his knee of left lower extremity extending approximately.  Denies any back pain.  Denies any bowel or bladder dysfunction.  He denies any falls.  He continues to work.  He actually left his job today at [employer].  He does not smoke, drink, use tobacco.  He continues to utilize marijuana and states that he smokes this nightly.  He also states that he has noticed issues with his short-term memory.  Referred to neurosurgery for evaluation of mild bulging disc in his lumbar spine.  He did also no-show this appointment.  He was also referred to cardiology for palpitations as well as rare isolated PVCs.  He also no showed this appointment.  His past medical history includes being a premature infant, and deafness which occurred as an infant.  He is related in the past this was due to neglect of ear  infections.  He has been evaluated at [facility] at which point treatment options were discussed.  The plan was to initiate [place] Malik, he was due for his first injection last week but did not show up for that appointment.  He was diagnosed with relapsing remitting multiple sclerosis.  He was deemed to be at risk for early physical and cognitive disability given large periventricular brain lesions, multiple discrete spinal cord lesions and being -American male.  Gilenya was also discussed but that would take longer to start in longer until effective.  He was initiated on vitamin D3.    As you recall, he was diagnosed with multiple sclerosis in November 2018 after presenting for hospital follow-up due to altered sensation on the left side of his body.  He did undergo testing including MRI of the brain, cervical spine, thoracic spines.  These tests were indicated of multiple sclerosis therefore he was sent for lumbar puncture.  He did have a traumatic lumbar tap and had some radiating pain down his left leg following that.  He presented to the ED following his lumbar tap with complaints of back pain and pain on the left leg, and MRI of the lumbar spine was performed noting a minor bulge of disc present at L4, 5, L3, 4 with no herniation.  There is no abnormal enhancement noted.  He was given a Medrol Dosepak and Flexeril.  He was also trialed on baclofen but states that he cannot tolerate either 1 of those.  He was advised to follow-up with neurosurgery in 1 week but that referral was not initiated and patient did not know about that appointment at that time.  His CSF was notable for positive, 6, oligoclonal bands, elevated IgG index, elevated myelin basic protein.  His JCV positive virus was positive at 2.89.       History of Present Illness   Neurologic Problem   The patient's primary symptoms include weakness. Primary symptoms comment: multiple sclerosis. New diagnosis 10/2018. This  is a new problem. The neurological problem developed gradually. Associated symptoms include back pain. Pertinent negatives include no bladder incontinence, bowel incontinence or vertigo. (Numbness and paraesthesias on the left side. ) (Deaf)   Back Pain   This is a new problem. Episode onset: immediately following LP 11/2018. The problem occurs daily. The problem is unchanged. The pain is present in the lumbar spine. The pain radiates to the left foot. The symptoms are aggravated by bending, lying down, sitting and standing. Associated symptoms include weakness. Pertinent negatives include no bladder incontinence or bowel incontinence. Treatments tried: flexeril, medrol dose pack.         Current Outpatient Medications   Medication Sig Dispense Refill   • cholecalciferol (VITAMIN D3) 1000 units tablet Take 1 tablet by mouth Daily. 30 tablet 2   • hydrOXYzine (ATARAX) 25 MG tablet Take 1 tablet by mouth At Night As Needed (insomnia). 30 tablet 1   • ondansetron (ZOFRAN) 4 MG tablet Take 1 tablet by mouth Every 6 (Six) Hours. 15 tablet 0     No current facility-administered medications for this visit.        Past Medical History:   Diagnosis Date   • Anxiety    • Deaf    • Hearing impaired    • THA virus antibody positive    • Multiple sclerosis (CMS/HCC)        History reviewed. No pertinent surgical history.    family history includes Cancer in his paternal grandmother; Dementia in his maternal grandmother; Heart disease in his paternal grandfather; Hypertension in his paternal grandmother.    Social History     Tobacco Use   • Smoking status: Never Smoker   • Smokeless tobacco: Never Used   Substance Use Topics   • Alcohol use: No     Frequency: Never     Comment: socially   • Drug use: Defer       Review of Systems   Constitutional: Positive for activity change.   HENT: Negative.    Eyes: Negative.    Respiratory: Negative.    Cardiovascular: Negative.    Gastrointestinal: Negative.    Endocrine: Negative.   "  Genitourinary: Negative.    Musculoskeletal: Positive for back pain and gait problem.   Skin: Negative.    Allergic/Immunologic: Negative.    Neurological: Positive for weakness and memory problem.   Hematological: Negative.    Psychiatric/Behavioral: Negative.    All other systems reviewed and are negative.      Objective     /72 (BP Location: Left arm, Patient Position: Sitting)   Pulse 78   Resp 18   Ht 175.3 cm (69\")   Wt 77.1 kg (170 lb)   BMI 25.10 kg/m² , Body mass index is 25.1 kg/m².    Physical Exam   Constitutional: He is oriented to person, place, and time. He appears well-developed and well-nourished.   HENT:   Head: Normocephalic and atraumatic.   Limited ophthalmologic exam with no evidence of papilledema.    Eyes: EOM are normal. Pupils are equal, round, and reactive to light.   Neck: Normal range of motion. Neck supple.   Cardiovascular: Normal rate, normal heart sounds and intact distal pulses.   Pulmonary/Chest: Effort normal and breath sounds normal.   Abdominal: Soft.   Musculoskeletal: Normal range of motion.   Neurological: He is alert and oriented to person, place, and time. He has normal strength and normal reflexes. He displays no tremor. A sensory deficit (patient reports a sharp/dull proprioception difference of the left lower extremity beginning at ankle exending distall into foot . ) is present. No cranial nerve deficit. He exhibits normal muscle tone. He displays a negative Romberg sign. GCS eye subscore is 4. GCS verbal subscore is 5. GCS motor subscore is 6. He displays no Babinski's sign on the right side. He displays no Babinski's sign on the left side.   Reflex Scores:       Tricep reflexes are 2+ on the right side and 2+ on the left side.       Bicep reflexes are 2+ on the right side and 2+ on the left side.       Brachioradialis reflexes are 2+ on the right side and 2+ on the left side.       Patellar reflexes are 2+ on the right side and 2+ on the left side.       " Achilles reflexes are 2+ on the right side and 2+ on the left side.  Awake, alert. No aphasia, no dysarthria  Completes simple and complex commands    CN II:  Visual fields full.  Pupils equally reactive to light  CN III, IV, VI:  Extraocular Muscles full with no signs of nystagmus  CN V:  Facial sensory is symmetric with no asymetries.  CN VII:  Facial motor symmetric  CN VIII:  Gross hearing intact bilaterally  CN IX:  Palate elevates symmetrically  CN X:  Palate elevates symmetrically  CN XI:  Shoulder shrug symmetric  CN XII:  Tongue is midline on protrusion    Full and symmetric strength bilateral upper and lower extremities.    FTN normal and HTS normal.   Skin: Skin is warm and dry. Capillary refill takes less than 2 seconds.   Psychiatric: He has a normal mood and affect. His speech is normal and behavior is normal. Cognition and memory are normal.   Nursing note and vitals reviewed.        Results for orders placed or performed in visit on 01/16/19   CBC No Differential   Result Value Ref Range    WBC 6.45 4.80 - 10.80 10*3/mm3    RBC 5.15 4.80 - 5.90 10*6/mm3    Hemoglobin 13.7 (L) 14.0 - 18.0 g/dL    Hematocrit 42.7 40.0 - 52.0 %    MCV 82.9 82.0 - 95.0 fL    MCH 26.6 (L) 28.0 - 32.0 pg    MCHC 32.1 (L) 33.0 - 36.0 g/dL    RDW 12.3 12.0 - 15.0 %    RDW-SD 37.5 (L) 40.0 - 54.0 fl    MPV 9.7 6.0 - 12.0 fL    Platelets 268 130 - 400 10*3/mm3   QuantiFERON TB Plus Client Incubated   Result Value Ref Range    QuantiFERON Criteria Comment     QUANTIFERON TB1 AG VALUE 0.35 IU/mL    QUANTIFERON TB2 AG VALUE 0.26 IU/mL    QuantiFERON Nil Value 0.07 IU/mL    QuantiFERON Mitogen Value >10.00 IU/mL    QUANTIFERON-TB GOLD PLUS Negative Negative   HIV-1 & HIV-2 Antibodies   Result Value Ref Range    HIV-1/ HIV-2 Negative Negative        ASSESSMENT/PLAN    Diagnoses and all orders for this visit:    Therapeutic drug monitoring  -     Vitamin D 25 Hydroxy; Future    Other orders  -     cholecalciferol (VITAMIN D3) 1000  units tablet; Take 1 tablet by mouth Daily.        Allergies and all known medications/prescriptions have been reviewed using resources available on this encounter.    Patient's Body mass index is 25.1 kg/m². BMI is within normal parameters. No follow-up required..    Return in about 4 weeks (around 3/21/2019).    MEDICAL DECISION MAKIN.  Long discussion with patient in the office today in regards to disease process.  He has no showed multiple referrals and appointments.  Did advise him we would need compliance with therapy in order to maximize his treatment process.  I did advise in the multiple sclerosis cannot be cured and outcome depending on his compliance of medications.  It is imperative that he get started on his of previous.  I do feel he is at risk of aggressive symptoms and prognosis given areas in his brain as well as multiple discrete spinal cord lesions and being -American male.  I do feel he needs continual follow-up with local multiple sclerosis clinic as well as supportive care at our office.  He is agreeable with this.  I did speak with our clinic and they have got him scheduled for his first of previous injection next week on 2019.  Did cancel his and his girlfriend on importance of keeping that appointment.  I see no focal neurological deficit on exam today.  His exam is comparable to review is.  He is ambulating without assist.  He is able to complete his daily activities.  We did discuss a possible oral steroid taper but due to the fact that he is getting his injection so quickly this was aborted.  2.  Continue vitamin D3.  I would like to obtain a vitamin D lab level for monitoring.  3.  Attempt to refer him again to neurosurgery for evaluation of MRI of lumbar spine.   4.  Will reinitiate referral to cardiology for management of palpitations as well as short runs of PVCs/mitral valve prolapse. He does not currently complain of chest pain or SOB. Orthopnea.  Likely  monitoring for this however would like input.   5.  Continue follow-up with primary care doctor as scheduled.  6.  Noncompliance.  7.  Use of marijuana.  Counseled on cessation.  Did advise him this could negatively impact his memory.  We will continue to monitor his memory at this time.      Upon further review of his outside records it appears that he was admitted to [facility], behavioral health for attempted suicide via medication ingestion.  It is noted that he took a handful of Flexeril and had possible homicidal ideation.  Upon further review he actually did not take the handful of Flexeril he spit these out.  He was admitted to [facility] for 72-hour hold.  I did speak with his girlfriend, Dayna in regards to this.  She is listed in his approved medical contacts.  She states that upon discharge from [facility] he has followed up with [facility] and they do not see a need to initiate medication management.  She states he is currently not suicidal.  She does not feel like she is in danger.  I did advise her should he have these feelings or reoccurrence to seek medical attention again. I WILL NOT BE PRESCRIBING SEDATIVE OR NARCOTICS TO PATIENT AT THIS TIME.     Would like to follow the patient closely in 4 weeks for evaluation.  LISBET Ferreira

## 2019-03-20 ENCOUNTER — DOCUMENTATION (OUTPATIENT)
Dept: NEUROLOGY | Facility: CLINIC | Age: 29
End: 2019-03-20

## 2019-03-20 NOTE — PROGRESS NOTES
I did call [facility] clinic and did leave a message on the nurses' line to send patient's office visit notes.

## 2019-03-21 ENCOUNTER — APPOINTMENT (OUTPATIENT)
Dept: CT IMAGING | Facility: HOSPITAL | Age: 29
End: 2019-03-21

## 2019-03-21 ENCOUNTER — HOSPITAL ENCOUNTER (EMERGENCY)
Facility: HOSPITAL | Age: 29
Discharge: HOME | End: 2019-03-21
Admitting: EMERGENCY MEDICINE

## 2019-03-21 VITALS
HEIGHT: 70 IN | OXYGEN SATURATION: 100 % | HEART RATE: 52 BPM | BODY MASS INDEX: 23.91 KG/M2 | WEIGHT: 167 LBS | RESPIRATION RATE: 18 BRPM | TEMPERATURE: 97.8 F | SYSTOLIC BLOOD PRESSURE: 134 MMHG | DIASTOLIC BLOOD PRESSURE: 87 MMHG

## 2019-03-21 DIAGNOSIS — R10.84 GENERALIZED ABDOMINAL PAIN: Primary | ICD-10-CM

## 2019-03-21 LAB
ALBUMIN SERPL-MCNC: 5.2 G/DL (ref 3.5–5)
ALBUMIN/GLOB SERPL: 1.6 G/DL (ref 1.1–2.5)
ALP SERPL-CCNC: 62 U/L (ref 24–120)
ALT SERPL W P-5'-P-CCNC: 28 U/L (ref 0–54)
ANION GAP SERPL CALCULATED.3IONS-SCNC: 13 MMOL/L (ref 4–13)
AST SERPL-CCNC: 32 U/L (ref 7–45)
BASOPHILS # BLD AUTO: 0.03 10*3/MM3 (ref 0–0.2)
BASOPHILS NFR BLD AUTO: 0.4 % (ref 0–2)
BILIRUB SERPL-MCNC: 1.3 MG/DL (ref 0.1–1)
BILIRUB UR QL STRIP: NEGATIVE
BUN BLD-MCNC: 8 MG/DL (ref 5–21)
BUN/CREAT SERPL: 8.3 (ref 7–25)
CALCIUM SPEC-SCNC: 9.8 MG/DL (ref 8.4–10.4)
CHLORIDE SERPL-SCNC: 99 MMOL/L (ref 98–110)
CLARITY UR: ABNORMAL
CO2 SERPL-SCNC: 29 MMOL/L (ref 24–31)
COLOR UR: YELLOW
CREAT BLD-MCNC: 0.96 MG/DL (ref 0.5–1.4)
D-LACTATE SERPL-SCNC: 2 MMOL/L (ref 0.5–2)
DEPRECATED RDW RBC AUTO: 37.2 FL (ref 40–54)
EOSINOPHIL # BLD AUTO: 0.16 10*3/MM3 (ref 0–0.7)
EOSINOPHIL NFR BLD AUTO: 2 % (ref 0–4)
ERYTHROCYTE [DISTWIDTH] IN BLOOD BY AUTOMATED COUNT: 12.6 % (ref 12–15)
GFR SERPL CREATININE-BSD FRML MDRD: 111 ML/MIN/1.73
GLOBULIN UR ELPH-MCNC: 3.3 GM/DL
GLUCOSE BLD-MCNC: 106 MG/DL (ref 70–100)
GLUCOSE UR STRIP-MCNC: NEGATIVE MG/DL
HCT VFR BLD AUTO: 45.5 % (ref 40–52)
HGB BLD-MCNC: 14.8 G/DL (ref 14–18)
HGB UR QL STRIP.AUTO: NEGATIVE
IMM GRANULOCYTES # BLD AUTO: 0.02 10*3/MM3 (ref 0–0.05)
IMM GRANULOCYTES NFR BLD AUTO: 0.3 % (ref 0–5)
KETONES UR QL STRIP: NEGATIVE
LEUKOCYTE ESTERASE UR QL STRIP.AUTO: NEGATIVE
LIPASE SERPL-CCNC: 42 U/L (ref 23–203)
LYMPHOCYTES # BLD AUTO: 1.47 10*3/MM3 (ref 0.72–4.86)
LYMPHOCYTES NFR BLD AUTO: 18.7 % (ref 15–45)
MCH RBC QN AUTO: 26.5 PG (ref 28–32)
MCHC RBC AUTO-ENTMCNC: 32.5 G/DL (ref 33–36)
MCV RBC AUTO: 81.5 FL (ref 82–95)
MONOCYTES # BLD AUTO: 0.6 10*3/MM3 (ref 0.19–1.3)
MONOCYTES NFR BLD AUTO: 7.6 % (ref 4–12)
NEUTROPHILS # BLD AUTO: 5.57 10*3/MM3 (ref 1.87–8.4)
NEUTROPHILS NFR BLD AUTO: 71 % (ref 39–78)
NITRITE UR QL STRIP: NEGATIVE
NRBC BLD AUTO-RTO: 0 /100 WBC (ref 0–0)
PH UR STRIP.AUTO: 8 [PH] (ref 5–8)
PLATELET # BLD AUTO: 267 10*3/MM3 (ref 130–400)
PMV BLD AUTO: 9.8 FL (ref 6–12)
POTASSIUM BLD-SCNC: 4 MMOL/L (ref 3.5–5.3)
PROT SERPL-MCNC: 8.5 G/DL (ref 6.3–8.7)
PROT UR QL STRIP: NEGATIVE
RBC # BLD AUTO: 5.58 10*6/MM3 (ref 4.8–5.9)
SODIUM BLD-SCNC: 141 MMOL/L (ref 135–145)
SP GR UR STRIP: 1.01 (ref 1–1.03)
UROBILINOGEN UR QL STRIP: ABNORMAL
WBC NRBC COR # BLD: 7.85 10*3/MM3 (ref 4.8–10.8)

## 2019-03-21 PROCEDURE — 85025 COMPLETE CBC W/AUTO DIFF WBC: CPT | Performed by: NURSE PRACTITIONER

## 2019-03-21 PROCEDURE — 96361 HYDRATE IV INFUSION ADD-ON: CPT

## 2019-03-21 PROCEDURE — 96374 THER/PROPH/DIAG INJ IV PUSH: CPT

## 2019-03-21 PROCEDURE — 99284 EMERGENCY DEPT VISIT MOD MDM: CPT

## 2019-03-21 PROCEDURE — 96376 TX/PRO/DX INJ SAME DRUG ADON: CPT

## 2019-03-21 PROCEDURE — 96375 TX/PRO/DX INJ NEW DRUG ADDON: CPT

## 2019-03-21 PROCEDURE — 80053 COMPREHEN METABOLIC PANEL: CPT | Performed by: NURSE PRACTITIONER

## 2019-03-21 PROCEDURE — 25010000002 ONDANSETRON PER 1 MG: Performed by: NURSE PRACTITIONER

## 2019-03-21 PROCEDURE — 81003 URINALYSIS AUTO W/O SCOPE: CPT | Performed by: NURSE PRACTITIONER

## 2019-03-21 PROCEDURE — 25010000002 IOPAMIDOL 61 % SOLUTION: Performed by: NURSE PRACTITIONER

## 2019-03-21 PROCEDURE — 83605 ASSAY OF LACTIC ACID: CPT | Performed by: NURSE PRACTITIONER

## 2019-03-21 PROCEDURE — 83690 ASSAY OF LIPASE: CPT | Performed by: NURSE PRACTITIONER

## 2019-03-21 PROCEDURE — 74177 CT ABD & PELVIS W/CONTRAST: CPT

## 2019-03-21 RX ORDER — FAMOTIDINE 20 MG/1
20 TABLET, FILM COATED ORAL NIGHTLY
Qty: 5 TABLET | Refills: 0 | Status: SHIPPED | OUTPATIENT
Start: 2019-03-21 | End: 2019-03-26

## 2019-03-21 RX ORDER — ONDANSETRON 2 MG/ML
4 INJECTION INTRAMUSCULAR; INTRAVENOUS ONCE
Status: COMPLETED | OUTPATIENT
Start: 2019-03-21 | End: 2019-03-21

## 2019-03-21 RX ORDER — ONDANSETRON 4 MG/1
4 TABLET, ORALLY DISINTEGRATING ORAL EVERY 6 HOURS PRN
Qty: 12 TABLET | Refills: 0 | Status: SHIPPED | OUTPATIENT
Start: 2019-03-21 | End: 2019-03-24

## 2019-03-21 RX ORDER — SODIUM CHLORIDE 0.9 % (FLUSH) 0.9 %
10 SYRINGE (ML) INJECTION AS NEEDED
Status: DISCONTINUED | OUTPATIENT
Start: 2019-03-21 | End: 2019-03-21 | Stop reason: HOSPADM

## 2019-03-21 RX ADMIN — HYDROMORPHONE HYDROCHLORIDE 1 MG: 1 INJECTION, SOLUTION INTRAMUSCULAR; INTRAVENOUS; SUBCUTANEOUS at 07:49

## 2019-03-21 RX ADMIN — ONDANSETRON 4 MG: 2 INJECTION INTRAMUSCULAR; INTRAVENOUS at 07:49

## 2019-03-21 RX ADMIN — SODIUM CHLORIDE 1000 ML: 9 INJECTION, SOLUTION INTRAVENOUS at 07:49

## 2019-03-21 RX ADMIN — IOPAMIDOL 100 ML: 612 INJECTION, SOLUTION INTRAVENOUS at 09:48

## 2019-03-21 RX ADMIN — ONDANSETRON HYDROCHLORIDE 4 MG: 2 SOLUTION INTRAMUSCULAR; INTRAVENOUS at 09:48

## 2019-03-21 NOTE — ED PROVIDER NOTES
Subjective   Patient is a 28-year-old male that presents to the ER today with complaint of abdominal pain and nausea vomiting.  The patient is hearing impaired and uses a family member to sign for him.  The patient reported to his family that he woke up at 6 AM with generalized abdominal pain and has vomited 6 times.  The patient did vomit twice out in triage.  The patient points to his entire stomach when asked where he hurts at.  The patient denies any diarrhea, black, tarry, or bloody stools.  Denies fever.  Patient has a history of multiple sclerosis which was diagnosed approximately 6 months ago.  He recently started ocrelizumab injections at the [facility].  The patient last had an injection on March 13, 2019.  Patient reports that is difficult for him to sit still due to the pain. The patient presents to the ER today for further evaluation.    Pt and family decline  today.         History provided by:  Patient and relative   used: No    Abdominal Pain   Pain location:  Generalized  Pain quality: aching and cramping    Pain radiates to:  Does not radiate  Pain severity:  Moderate  Onset quality:  Sudden  Duration:  1 hour  Timing:  Constant  Progression:  Worsening  Chronicity:  New  Context: awakening from sleep    Context: not alcohol use, not diet changes, not eating, not laxative use, not medication withdrawal, not previous surgeries, not recent illness, not recent sexual activity, not recent travel, not retching, not sick contacts, not suspicious food intake and not trauma    Relieved by:  Nothing  Worsened by:  Nothing  Ineffective treatments:  None tried  Associated symptoms: nausea and vomiting    Associated symptoms: no anorexia, no belching, no chest pain, no chills, no constipation, no cough, no diarrhea, no dysuria, no fatigue, no fever, no flatus, no hematemesis, no hematochezia, no hematuria, no melena, no shortness of breath, no sore throat, no  vaginal bleeding and no vaginal discharge    Risk factors: no alcohol abuse, no aspirin use, not elderly, has not had multiple surgeries, no NSAID use, not obese, not pregnant and no recent hospitalization        Review of Systems   Constitutional: Negative for chills, fatigue and fever.   HENT: Negative for sore throat.    Respiratory: Negative for cough and shortness of breath.    Cardiovascular: Negative for chest pain.   Gastrointestinal: Positive for abdominal pain, nausea and vomiting. Negative for anorexia, constipation, diarrhea, flatus, hematemesis, hematochezia and melena.   Genitourinary: Negative for dysuria, hematuria, vaginal bleeding and vaginal discharge.   All other systems reviewed and are negative.      Past Medical History:   Diagnosis Date   • Anxiety    • Deaf    • Hearing impaired    • THA virus antibody positive    • Multiple sclerosis (CMS/HCC)        Allergies   Allergen Reactions   • Shellfish-Derived Products Anaphylaxis   • Aldomet [Methyldopa] Rash     Pt denies    • Augmentin [Amoxicillin-Pot Clavulanate] Rash   • Hydrocodone Nausea And Vomiting       History reviewed. No pertinent surgical history.    Family History   Problem Relation Age of Onset   • Dementia Maternal Grandmother    • Cancer Paternal Grandmother    • Hypertension Paternal Grandmother    • Heart disease Paternal Grandfather        Social History     Socioeconomic History   • Marital status: Single     Spouse name: Not on file   • Number of children: Not on file   • Years of education: Not on file   • Highest education level: Not on file   Tobacco Use   • Smoking status: Never Smoker   • Smokeless tobacco: Never Used   Substance and Sexual Activity   • Alcohol use: No     Frequency: Never     Comment: socially   • Drug use: Defer   • Sexual activity: Yes     Partners: Female     Birth control/protection: Condom           Objective   Physical Exam   Constitutional: He is oriented to person, place, and time. He appears  well-developed and well-nourished.   HENT:   Head: Normocephalic and atraumatic.   Eyes: Conjunctivae are normal. Pupils are equal, round, and reactive to light.   Cardiovascular: Normal rate, regular rhythm and normal heart sounds.   Pulmonary/Chest: Effort normal and breath sounds normal.   Abdominal: Soft. Bowel sounds are normal. There is generalized tenderness.   Neurological: He is alert and oriented to person, place, and time.   Skin: Skin is warm and dry. Capillary refill takes less than 2 seconds.   Psychiatric: He has a normal mood and affect.   Nursing note and vitals reviewed.      Procedures           ED Course  ED Course as of Mar 21 1537   Thu Mar 21, 2019   1030 Patient's labs are reviewed.  Lipase was normal.  Urinalysis is unremarkable.  CMP shows a normal renal, normal liver enzymes with only a slightly elevated bilirubin she shows a normal white blood cell count, normal hemoglobin hematocrit, normal platelet count.  [LF]   1030 CT scan of the abdomen and pelvis shows no acute findings.   [LF]   1030 At this time the patient will be discharged home in stable condition.  Patient will be given a prescription for Zofran to use for nausea and vomiting.  He is advised home and rest, follow-up with his primary care provider in 1-2 days for recheck.  He is advised to push p.o. fluids.  He is advised to return to the ER for any new or worsening symptoms. Will also give the pt a RX for pepcid.   [LF]   1034 Pt advised clear liquid diet x 24 hours, advance as tolerated.   [LF]      ED Course User Index  [LF] Natasha Sandra, APRN      CT Abdomen Pelvis With Contrast   Final Result   1. No acute process identified in the abdomen or pelvis.    2. No strong CT evidence for colitis/enteritis. The bowel is   nondistended.   3. Small simple renal cysts. No hydronephrosis or stone.       This report was finalized on 03/21/2019 10:22 by Dr David Shaw, .        Labs Reviewed   COMPREHENSIVE METABOLIC PANEL -  Abnormal; Notable for the following components:       Result Value    Glucose 106 (*)     Albumin 5.20 (*)     Total Bilirubin 1.3 (*)     All other components within normal limits    Narrative:     GFR Normal >60  Chronic Kidney Disease <60  Kidney Failure <15   URINALYSIS W/ CULTURE IF INDICATED - Abnormal; Notable for the following components:    Appearance, UA Turbid (*)     All other components within normal limits    Narrative:     Urine microscopic not indicated.   CBC WITH AUTO DIFFERENTIAL - Abnormal; Notable for the following components:    MCV 81.5 (*)     MCH 26.5 (*)     MCHC 32.5 (*)     RDW-SD 37.2 (*)     All other components within normal limits   LIPASE - Normal   LACTIC ACID, PLASMA - Normal   CBC AND DIFFERENTIAL    Narrative:     The following orders were created for panel order CBC & Differential.  Procedure                               Abnormality         Status                     ---------                               -----------         ------                     CBC Auto Differential[[id number]]        Abnormal            Final result                 Please view results for these tests on the individual orders.                 MDM  Number of Diagnoses or Management Options  Generalized abdominal pain: new and requires workup     Amount and/or Complexity of Data Reviewed  Clinical lab tests: ordered and reviewed  Tests in the radiology section of CPT®: ordered and reviewed  Discuss the patient with other providers: yes    Patient Progress  Patient progress: stable        Final diagnoses:   Generalized abdominal pain            Natasha Sandra, APRN  03/21/19 1034       Natasha Sandra, APRN  03/21/19 1537

## 2019-03-21 NOTE — DISCHARGE INSTRUCTIONS
Return to the ER as needed  Clear liquid diet x 24 hours, advance as tolerated  Use medications as prescribed  Push PO fluids  Return to the ER as needed

## 2019-03-31 ENCOUNTER — HOSPITAL ENCOUNTER (EMERGENCY)
Facility: HOSPITAL | Age: 29
Discharge: HOME | End: 2019-03-31
Admitting: EMERGENCY MEDICINE

## 2019-03-31 ENCOUNTER — APPOINTMENT (OUTPATIENT)
Dept: CT IMAGING | Facility: HOSPITAL | Age: 29
End: 2019-03-31

## 2019-03-31 VITALS
HEART RATE: 74 BPM | HEIGHT: 70 IN | BODY MASS INDEX: 23.85 KG/M2 | RESPIRATION RATE: 14 BRPM | DIASTOLIC BLOOD PRESSURE: 74 MMHG | SYSTOLIC BLOOD PRESSURE: 138 MMHG | TEMPERATURE: 98 F | OXYGEN SATURATION: 99 % | WEIGHT: 166.6 LBS

## 2019-03-31 DIAGNOSIS — R10.13 INTERMITTENT EPIGASTRIC ABDOMINAL PAIN: Primary | ICD-10-CM

## 2019-03-31 DIAGNOSIS — K62.5 RECTAL BLEEDING: ICD-10-CM

## 2019-03-31 LAB
ALBUMIN SERPL-MCNC: 5.1 G/DL (ref 3.5–5)
ALBUMIN/GLOB SERPL: 1.6 G/DL (ref 1.1–2.5)
ALP SERPL-CCNC: 60 U/L (ref 24–120)
ALT SERPL W P-5'-P-CCNC: 33 U/L (ref 0–54)
ANION GAP SERPL CALCULATED.3IONS-SCNC: 13 MMOL/L (ref 4–13)
AST SERPL-CCNC: 28 U/L (ref 7–45)
BASOPHILS # BLD AUTO: 0.02 10*3/MM3 (ref 0–0.2)
BASOPHILS NFR BLD AUTO: 0.5 % (ref 0–2)
BILIRUB SERPL-MCNC: 1.3 MG/DL (ref 0.1–1)
BUN BLD-MCNC: 14 MG/DL (ref 5–21)
BUN/CREAT SERPL: 11.6 (ref 7–25)
CALCIUM SPEC-SCNC: 9.9 MG/DL (ref 8.4–10.4)
CHLORIDE SERPL-SCNC: 100 MMOL/L (ref 98–110)
CO2 SERPL-SCNC: 28 MMOL/L (ref 24–31)
CREAT BLD-MCNC: 1.21 MG/DL (ref 0.5–1.4)
D-LACTATE SERPL-SCNC: 1.4 MMOL/L (ref 0.5–2)
DEPRECATED RDW RBC AUTO: 36.8 FL (ref 40–54)
DEVELOPER EXPIRATION DATE: ABNORMAL
DEVELOPER LOT NUMBER: 145
EOSINOPHIL # BLD AUTO: 0.03 10*3/MM3 (ref 0–0.7)
EOSINOPHIL NFR BLD AUTO: 0.7 % (ref 0–4)
ERYTHROCYTE [DISTWIDTH] IN BLOOD BY AUTOMATED COUNT: 12.6 % (ref 12–15)
EXPIRATION DATE: ABNORMAL
FECAL OCCULT BLOOD SCREEN, POC: POSITIVE
GFR SERPL CREATININE-BSD FRML MDRD: 85 ML/MIN/1.73
GLOBULIN UR ELPH-MCNC: 3.2 GM/DL
GLUCOSE BLD-MCNC: 113 MG/DL (ref 70–100)
HCT VFR BLD AUTO: 42.6 % (ref 40–52)
HGB BLD-MCNC: 14.2 G/DL (ref 14–18)
HOLD SPECIMEN: NORMAL
HOLD SPECIMEN: NORMAL
IMM GRANULOCYTES # BLD AUTO: 0.01 10*3/MM3 (ref 0–0.05)
IMM GRANULOCYTES NFR BLD AUTO: 0.2 % (ref 0–5)
LYMPHOCYTES # BLD AUTO: 1.37 10*3/MM3 (ref 0.72–4.86)
LYMPHOCYTES NFR BLD AUTO: 31.1 % (ref 15–45)
Lab: 145
MCH RBC QN AUTO: 26.9 PG (ref 28–32)
MCHC RBC AUTO-ENTMCNC: 33.3 G/DL (ref 33–36)
MCV RBC AUTO: 80.7 FL (ref 82–95)
MONOCYTES # BLD AUTO: 0.45 10*3/MM3 (ref 0.19–1.3)
MONOCYTES NFR BLD AUTO: 10.2 % (ref 4–12)
NEGATIVE CONTROL: NEGATIVE
NEUTROPHILS # BLD AUTO: 2.53 10*3/MM3 (ref 1.87–8.4)
NEUTROPHILS NFR BLD AUTO: 57.3 % (ref 39–78)
NRBC BLD AUTO-RTO: 0 /100 WBC (ref 0–0)
PLATELET # BLD AUTO: 269 10*3/MM3 (ref 130–400)
PMV BLD AUTO: 9.6 FL (ref 6–12)
POSITIVE CONTROL: POSITIVE
POTASSIUM BLD-SCNC: 3.8 MMOL/L (ref 3.5–5.3)
PROT SERPL-MCNC: 8.3 G/DL (ref 6.3–8.7)
RBC # BLD AUTO: 5.28 10*6/MM3 (ref 4.8–5.9)
SODIUM BLD-SCNC: 141 MMOL/L (ref 135–145)
WBC NRBC COR # BLD: 4.41 10*3/MM3 (ref 4.8–10.8)
WHOLE BLOOD HOLD SPECIMEN: NORMAL
WHOLE BLOOD HOLD SPECIMEN: NORMAL

## 2019-03-31 PROCEDURE — 96361 HYDRATE IV INFUSION ADD-ON: CPT

## 2019-03-31 PROCEDURE — 96374 THER/PROPH/DIAG INJ IV PUSH: CPT

## 2019-03-31 PROCEDURE — 96375 TX/PRO/DX INJ NEW DRUG ADDON: CPT

## 2019-03-31 PROCEDURE — 83605 ASSAY OF LACTIC ACID: CPT | Performed by: PHYSICIAN ASSISTANT

## 2019-03-31 PROCEDURE — 82270 OCCULT BLOOD FECES: CPT

## 2019-03-31 PROCEDURE — 25010000002 ONDANSETRON PER 1 MG: Performed by: PHYSICIAN ASSISTANT

## 2019-03-31 PROCEDURE — 99283 EMERGENCY DEPT VISIT LOW MDM: CPT

## 2019-03-31 PROCEDURE — 80053 COMPREHEN METABOLIC PANEL: CPT | Performed by: PHYSICIAN ASSISTANT

## 2019-03-31 PROCEDURE — 85025 COMPLETE CBC W/AUTO DIFF WBC: CPT | Performed by: PHYSICIAN ASSISTANT

## 2019-03-31 RX ORDER — FAMOTIDINE 10 MG/ML
20 INJECTION, SOLUTION INTRAVENOUS ONCE
Status: COMPLETED | OUTPATIENT
Start: 2019-03-31 | End: 2019-03-31

## 2019-03-31 RX ORDER — ONDANSETRON 2 MG/ML
4 INJECTION INTRAMUSCULAR; INTRAVENOUS ONCE
Status: COMPLETED | OUTPATIENT
Start: 2019-03-31 | End: 2019-03-31

## 2019-03-31 RX ORDER — OMEPRAZOLE 40 MG/1
40 CAPSULE, DELAYED RELEASE ORAL DAILY
Qty: 14 CAPSULE | Refills: 0 | Status: SHIPPED | OUTPATIENT
Start: 2019-03-31 | End: 2019-04-14

## 2019-03-31 RX ADMIN — FAMOTIDINE 20 MG: 10 INJECTION, SOLUTION INTRAVENOUS at 08:29

## 2019-03-31 RX ADMIN — ONDANSETRON 4 MG: 2 INJECTION INTRAMUSCULAR; INTRAVENOUS at 08:29

## 2019-03-31 RX ADMIN — SODIUM CHLORIDE 500 ML: 9 INJECTION, SOLUTION INTRAVENOUS at 08:28

## 2019-04-05 ENCOUNTER — OFFICE VISIT (OUTPATIENT)
Dept: INTERNAL MEDICINE | Facility: CLINIC | Age: 29
End: 2019-04-05

## 2019-04-05 VITALS
DIASTOLIC BLOOD PRESSURE: 78 MMHG | WEIGHT: 167 LBS | OXYGEN SATURATION: 99 % | HEART RATE: 62 BPM | BODY MASS INDEX: 23.91 KG/M2 | HEIGHT: 70 IN | SYSTOLIC BLOOD PRESSURE: 116 MMHG | RESPIRATION RATE: 12 BRPM | TEMPERATURE: 98.9 F

## 2019-04-05 DIAGNOSIS — H91.90 DEAFNESS, UNSPECIFIED LATERALITY: ICD-10-CM

## 2019-04-05 DIAGNOSIS — G35 MULTIPLE SCLEROSIS (HCC): ICD-10-CM

## 2019-04-05 DIAGNOSIS — K62.5 RECTAL BLEEDING: Primary | ICD-10-CM

## 2019-04-05 PROCEDURE — 99214 OFFICE O/P EST MOD 30 MIN: CPT | Performed by: FAMILY MEDICINE

## 2019-04-05 NOTE — PATIENT INSTRUCTIONS
Try high fiber diet with more water to soften stools.     If you improve your diet and still have blood in stool, we can send you to a GI doctor for a colonoscopy.     Follow up as needed    High-Fiber Diet  Fiber, also called dietary fiber, is a type of carbohydrate found in fruits, vegetables, whole grains, and beans. A high-fiber diet can have many health benefits. Your health care provider may recommend a high-fiber diet to help:  · Prevent constipation. Fiber can make your bowel movements more regular.  · Lower your cholesterol.  · Relieve hemorrhoids, uncomplicated diverticulosis, or irritable bowel syndrome.  · Prevent overeating as part of a weight-loss plan.  · Prevent heart disease, type 2 diabetes, and certain cancers.    What is my plan?  The recommended daily intake of fiber includes:  · 38 grams for men under age 50.  · 30 grams for men over age 50.  · 25 grams for women under age 50.  · 21 grams for women over age 50.    You can get the recommended daily intake of dietary fiber by eating a variety of fruits, vegetables, grains, and beans. Your health care provider may also recommend a fiber supplement if it is not possible to get enough fiber through your diet.  What do I need to know about a high-fiber diet?  · Fiber supplements have not been widely studied for their effectiveness, so it is better to get fiber through food sources.  · Always check the fiber content on the nutrition facts label of any prepackaged food. Look for foods that contain at least 5 grams of fiber per serving.  · Ask your dietitian if you have questions about specific foods that are related to your condition, especially if those foods are not listed in the following section.  · Increase your daily fiber consumption gradually. Increasing your intake of dietary fiber too quickly may cause bloating, cramping, or gas.  · Drink plenty of water. Water helps you to digest fiber.  What foods can I eat?  Grains  Whole-grain breads.  Multigrain cereal. Oats and oatmeal. Brown rice. Barley. Bulgur wheat. Millet. Bran muffins. Popcorn. Rye wafer crackers.  Vegetables  Sweet potatoes. Spinach. Kale. Artichokes. Cabbage. Broccoli. Green peas. Carrots. Squash.  Fruits  Berries. Pears. Apples. Oranges. Avocados. Prunes and raisins. Dried figs.  Meats and Other Protein Sources  Navy, kidney, ardon, and soy beans. Split peas. Lentils. Nuts and seeds.  Dairy  Fiber-fortified yogurt.  Beverages  Fiber-fortified soy milk. Fiber-fortified orange juice.  Other  Fiber bars.  The items listed above may not be a complete list of recommended foods or beverages. Contact your dietitian for more options.  What foods are not recommended?  Grains  White bread. Pasta made with refined flour. White rice.  Vegetables  Fried potatoes. Canned vegetables. Well-cooked vegetables.  Fruits  Fruit juice. Cooked, strained fruit.  Meats and Other Protein Sources  Fatty cuts of meat. Fried poultry or fried fish.  Dairy  Milk. Yogurt. Cream cheese. Sour cream.  Beverages  Soft drinks.  Other  Cakes and pastries. Butter and oils.  The items listed above may not be a complete list of foods and beverages to avoid. Contact your dietitian for more information.  What are some tips for including high-fiber foods in my diet?  · Eat a wide variety of high-fiber foods.  · Make sure that half of all grains consumed each day are whole grains.  · Replace breads and cereals made from refined flour or white flour with whole-grain breads and cereals.  · Replace white rice with brown rice, bulgur wheat, or millet.  · Start the day with a breakfast that is high in fiber, such as a cereal that contains at least 5 grams of fiber per serving.  · Use beans in place of meat in soups, salads, or pasta.  · Eat high-fiber snacks, such as berries, raw vegetables, nuts, or popcorn.  This information is not intended to replace advice given to you by your health care provider. Make sure you discuss any  questions you have with your health care provider.  Document Released: 12/18/2006 Document Revised: 05/25/2017 Document Reviewed: 06/02/2015  [employer] Interactive Patient Education © 2018 [employer]

## 2019-04-05 NOTE — ASSESSMENT & PLAN NOTE
Suspect internal hemorrhoid, less likely malignancy based on recent negative CT scans as well as reassuring labs which were reviewed today

## 2019-04-05 NOTE — PROGRESS NOTES
CC:   Chief Complaint   Patient presents with   • Follow-up     Patient here with Santa- girlfriend, she states he needs a letter stating he needs a live in aid so that income is not affected to [address]   • Rectal Bleeding     History:  Deangelo Hall is a 28 y.o. male who presents today for follow-up for evaluation of the above:    Here for evaluation of intermittent rectal bleeding after 2 separate ED visits last month for epigastric pain, decreased appetite, blood in stool.  He did have positive FOBT, says that he was evaluated with rectal exam twice without evidence of hemorrhoid, and had CT abdomen pelvis without acute process or evidence of colitis/enteritis.  He and his girlfriend report that he has a very low fiber diet.    He also request letter for living aid.  He is currently working, able to exercise and play basketball, and though he has MS his symptoms at this point have only resulted in decreased sensation and is currently being treated per neurology service.    ROS:  Review of Systems   Constitutional: Negative for chills, fatigue and fever.   Gastrointestinal: Positive for blood in stool. Abdominal pain: none currently.       Mr. Hall  reports that he has never smoked. He has never used smokeless tobacco. Drug use questions deferred to the physician. He reports that he does not drink alcohol.      Current Outpatient Medications:   •  cholecalciferol (VITAMIN D3) 1000 units tablet, Take 1 tablet by mouth Daily., Disp: 30 tablet, Rfl: 2  •  hydrOXYzine (ATARAX) 25 MG tablet, Take 1 tablet by mouth At Night As Needed (insomnia)., Disp: 30 tablet, Rfl: 1  •  Ocrelizumab (OCREVUS IV), Infuse  into a venous catheter Every 6 (Six) Months., Disp: , Rfl:   •  omeprazole (priLOSEC) 40 MG capsule, Take 1 capsule by mouth Daily for 14 days., Disp: 14 capsule, Rfl: 0  •  ondansetron (ZOFRAN) 4 MG tablet, Take 1 tablet by mouth Every 6 (Six) Hours., Disp: 15 tablet, Rfl: 0      OBJECTIVE:  BP  "116/78 (BP Location: Left arm, Patient Position: Sitting, Cuff Size: Adult)   Pulse 62   Temp 98.9 °F (37.2 °C) (Temporal)   Resp 12   Ht 177.8 cm (70\")   Wt 75.8 kg (167 lb)   SpO2 99%   BMI 23.96 kg/m²    Physical Exam   Constitutional: He is oriented to person, place, and time. No distress.   HENT:   Head: Normocephalic and atraumatic.   Nose: Nose normal.   Eyes: Conjunctivae are normal. Right eye exhibits no discharge. Left eye exhibits no discharge. No scleral icterus.   Neck: No tracheal deviation present.   Pulmonary/Chest: Effort normal.   Abdominal: Soft. Bowel sounds are normal. He exhibits no distension and no mass. Tenderness: periumbilical with palpation of rectus muscles, not with deep palpation.   Neurological: He is alert and oriented to person, place, and time.   Skin: Skin is warm and dry. He is not diaphoretic. No pallor.   Psychiatric: He has a normal mood and affect.   Nursing note and vitals reviewed.      Assessment/Plan    Problem List Items Addressed This Visit        Digestive    Rectal bleeding - Primary     Suspect internal hemorrhoid, less likely malignancy based on recent negative CT scans as well as reassuring labs which were reviewed today            Nervous and Auditory    Multiple sclerosis (CMS/HCC)    Deaf      -Increase hydration  -High-fiber diet  -Declined letter given functional status at this point, offered home health or occupational medicine eval which patient declined today  -Follow-up as needed    Patient's Body mass index is 23.96 kg/m². BMI is within normal parameters. No follow-up required..      An After Visit Summary was printed and given to the patient at discharge.  Return if symptoms worsen or fail to improve. Sooner if problems arise.         Ildefonso Lawler D.O.  [facility]  Osteopathic Neuromusculoskeletal Medicine  "

## 2019-04-19 ENCOUNTER — HOSPITAL ENCOUNTER (EMERGENCY)
Facility: HOSPITAL | Age: 29
Discharge: HOME | End: 2019-04-19
Attending: EMERGENCY MEDICINE | Admitting: EMERGENCY MEDICINE

## 2019-04-19 ENCOUNTER — APPOINTMENT (OUTPATIENT)
Dept: CT IMAGING | Facility: HOSPITAL | Age: 29
End: 2019-04-19

## 2019-04-19 VITALS
TEMPERATURE: 98.3 F | HEIGHT: 70 IN | DIASTOLIC BLOOD PRESSURE: 77 MMHG | RESPIRATION RATE: 16 BRPM | SYSTOLIC BLOOD PRESSURE: 136 MMHG | BODY MASS INDEX: 24.12 KG/M2 | HEART RATE: 57 BPM | WEIGHT: 168.5 LBS | OXYGEN SATURATION: 95 %

## 2019-04-19 DIAGNOSIS — M79.10 MYALGIA: ICD-10-CM

## 2019-04-19 DIAGNOSIS — F41.9 ANXIETY AND DEPRESSION: Primary | ICD-10-CM

## 2019-04-19 DIAGNOSIS — M62.838 MUSCLE SPASM: ICD-10-CM

## 2019-04-19 DIAGNOSIS — F32.A ANXIETY AND DEPRESSION: Primary | ICD-10-CM

## 2019-04-19 LAB
ALBUMIN SERPL-MCNC: 4.6 G/DL (ref 3.5–5)
ALBUMIN/GLOB SERPL: 1.6 G/DL (ref 1.1–2.5)
ALP SERPL-CCNC: 56 U/L (ref 24–120)
ALT SERPL W P-5'-P-CCNC: 21 U/L (ref 0–54)
ANION GAP SERPL CALCULATED.3IONS-SCNC: 6 MMOL/L (ref 4–13)
AST SERPL-CCNC: 28 U/L (ref 7–45)
BASOPHILS # BLD AUTO: 0.02 10*3/MM3 (ref 0–0.2)
BASOPHILS NFR BLD AUTO: 0.4 % (ref 0–2)
BILIRUB SERPL-MCNC: 1 MG/DL (ref 0.1–1)
BUN BLD-MCNC: 11 MG/DL (ref 5–21)
BUN/CREAT SERPL: 11.8 (ref 7–25)
CALCIUM SPEC-SCNC: 9.2 MG/DL (ref 8.4–10.4)
CHLORIDE SERPL-SCNC: 103 MMOL/L (ref 98–110)
CO2 SERPL-SCNC: 29 MMOL/L (ref 24–31)
CREAT BLD-MCNC: 0.93 MG/DL (ref 0.5–1.4)
DEPRECATED RDW RBC AUTO: 37 FL (ref 40–54)
EOSINOPHIL # BLD AUTO: 0.03 10*3/MM3 (ref 0–0.7)
EOSINOPHIL NFR BLD AUTO: 0.6 % (ref 0–4)
ERYTHROCYTE [DISTWIDTH] IN BLOOD BY AUTOMATED COUNT: 12.8 % (ref 12–15)
GFR SERPL CREATININE-BSD FRML MDRD: 116 ML/MIN/1.73
GLOBULIN UR ELPH-MCNC: 2.8 GM/DL
GLUCOSE BLD-MCNC: 95 MG/DL (ref 70–100)
GLUCOSE BLDC GLUCOMTR-MCNC: 110 MG/DL (ref 70–130)
HCT VFR BLD AUTO: 37.5 % (ref 40–52)
HGB BLD-MCNC: 12.3 G/DL (ref 14–18)
HOLD SPECIMEN: NORMAL
HOLD SPECIMEN: NORMAL
IMM GRANULOCYTES # BLD AUTO: 0.01 10*3/MM3 (ref 0–0.05)
IMM GRANULOCYTES NFR BLD AUTO: 0.2 % (ref 0–5)
LYMPHOCYTES # BLD AUTO: 1.49 10*3/MM3 (ref 0.72–4.86)
LYMPHOCYTES NFR BLD AUTO: 29 % (ref 15–45)
MCH RBC QN AUTO: 26.6 PG (ref 28–32)
MCHC RBC AUTO-ENTMCNC: 32.8 G/DL (ref 33–36)
MCV RBC AUTO: 81 FL (ref 82–95)
MONOCYTES # BLD AUTO: 0.66 10*3/MM3 (ref 0.19–1.3)
MONOCYTES NFR BLD AUTO: 12.8 % (ref 4–12)
NEUTROPHILS # BLD AUTO: 2.93 10*3/MM3 (ref 1.87–8.4)
NEUTROPHILS NFR BLD AUTO: 57 % (ref 39–78)
NRBC BLD AUTO-RTO: 0 /100 WBC (ref 0–0.2)
PLATELET # BLD AUTO: 220 10*3/MM3 (ref 130–400)
PMV BLD AUTO: 10 FL (ref 6–12)
POTASSIUM BLD-SCNC: 3.8 MMOL/L (ref 3.5–5.3)
PROT SERPL-MCNC: 7.4 G/DL (ref 6.3–8.7)
RBC # BLD AUTO: 4.63 10*6/MM3 (ref 4.8–5.9)
SODIUM BLD-SCNC: 138 MMOL/L (ref 135–145)
WBC NRBC COR # BLD: 5.14 10*3/MM3 (ref 4.8–10.8)
WHOLE BLOOD HOLD SPECIMEN: NORMAL
WHOLE BLOOD HOLD SPECIMEN: NORMAL

## 2019-04-19 PROCEDURE — 70450 CT HEAD/BRAIN W/O DYE: CPT

## 2019-04-19 PROCEDURE — 99284 EMERGENCY DEPT VISIT MOD MDM: CPT

## 2019-04-19 PROCEDURE — 82962 GLUCOSE BLOOD TEST: CPT

## 2019-04-19 PROCEDURE — 85025 COMPLETE CBC W/AUTO DIFF WBC: CPT | Performed by: EMERGENCY MEDICINE

## 2019-04-19 PROCEDURE — 80053 COMPREHEN METABOLIC PANEL: CPT | Performed by: EMERGENCY MEDICINE

## 2019-04-19 RX ORDER — SODIUM CHLORIDE 0.9 % (FLUSH) 0.9 %
10 SYRINGE (ML) INJECTION AS NEEDED
Status: DISCONTINUED | OUTPATIENT
Start: 2019-04-19 | End: 2019-04-19 | Stop reason: HOSPADM

## 2019-04-30 ENCOUNTER — OFFICE VISIT (OUTPATIENT)
Dept: CARDIOLOGY | Facility: CLINIC | Age: 29
End: 2019-04-30

## 2019-04-30 VITALS
HEART RATE: 78 BPM | OXYGEN SATURATION: 99 % | DIASTOLIC BLOOD PRESSURE: 70 MMHG | WEIGHT: 163 LBS | SYSTOLIC BLOOD PRESSURE: 120 MMHG | BODY MASS INDEX: 23.34 KG/M2 | HEIGHT: 70 IN

## 2019-04-30 DIAGNOSIS — I49.3 PVC (PREMATURE VENTRICULAR CONTRACTION): Primary | ICD-10-CM

## 2019-04-30 PROCEDURE — 99243 OFF/OP CNSLTJ NEW/EST LOW 30: CPT | Performed by: INTERNAL MEDICINE

## 2019-04-30 PROCEDURE — 93000 ELECTROCARDIOGRAM COMPLETE: CPT | Performed by: INTERNAL MEDICINE

## 2019-05-08 PROBLEM — I49.3 PVC (PREMATURE VENTRICULAR CONTRACTION): Status: ACTIVE | Noted: 2019-05-08

## 2019-06-03 ENCOUNTER — TELEPHONE (OUTPATIENT)
Dept: INTERNAL MEDICINE | Facility: CLINIC | Age: 29
End: 2019-06-03

## 2019-06-03 DIAGNOSIS — H91.90 HEARING LOSS, UNSPECIFIED HEARING LOSS TYPE, UNSPECIFIED LATERALITY: Primary | ICD-10-CM

## 2021-02-20 ENCOUNTER — APPOINTMENT (OUTPATIENT)
Dept: CT IMAGING | Facility: HOSPITAL | Age: 31
End: 2021-02-20

## 2021-02-20 ENCOUNTER — HOSPITAL ENCOUNTER (EMERGENCY)
Facility: HOSPITAL | Age: 31
Discharge: HOME | End: 2021-02-20
Admitting: EMERGENCY MEDICINE

## 2021-02-20 VITALS
SYSTOLIC BLOOD PRESSURE: 128 MMHG | HEART RATE: 60 BPM | WEIGHT: 166 LBS | TEMPERATURE: 98.1 F | BODY MASS INDEX: 23.77 KG/M2 | RESPIRATION RATE: 20 BRPM | DIASTOLIC BLOOD PRESSURE: 81 MMHG | OXYGEN SATURATION: 98 % | HEIGHT: 70 IN

## 2021-02-20 DIAGNOSIS — R11.2 NAUSEA AND VOMITING, INTRACTABILITY OF VOMITING NOT SPECIFIED, UNSPECIFIED VOMITING TYPE: Primary | ICD-10-CM

## 2021-02-20 DIAGNOSIS — N28.1 RENAL CYST: ICD-10-CM

## 2021-02-20 DIAGNOSIS — F12.90 CANNABINOID HYPEREMESIS SYNDROME: ICD-10-CM

## 2021-02-20 DIAGNOSIS — R11.2 CANNABINOID HYPEREMESIS SYNDROME: ICD-10-CM

## 2021-02-20 LAB
ALBUMIN SERPL-MCNC: 5.2 G/DL (ref 3.5–5.2)
ALBUMIN/GLOB SERPL: 1.7 G/DL
ALP SERPL-CCNC: 53 U/L (ref 39–117)
ALT SERPL W P-5'-P-CCNC: 24 U/L (ref 1–41)
AMPHET+METHAMPHET UR QL: NEGATIVE
AMPHETAMINES UR QL: NEGATIVE
ANION GAP SERPL CALCULATED.3IONS-SCNC: 9 MMOL/L (ref 5–15)
AST SERPL-CCNC: 29 U/L (ref 1–40)
BARBITURATES UR QL SCN: NEGATIVE
BASOPHILS # BLD AUTO: 0.03 10*3/MM3 (ref 0–0.2)
BASOPHILS NFR BLD AUTO: 0.3 % (ref 0–1.5)
BENZODIAZ UR QL SCN: NEGATIVE
BILIRUB SERPL-MCNC: 0.8 MG/DL (ref 0–1.2)
BILIRUB UR QL STRIP: NEGATIVE
BUN SERPL-MCNC: 11 MG/DL (ref 6–20)
BUN/CREAT SERPL: 12.8 (ref 7–25)
BUPRENORPHINE SERPL-MCNC: NEGATIVE NG/ML
CALCIUM SPEC-SCNC: 9.7 MG/DL (ref 8.6–10.5)
CANNABINOIDS SERPL QL: POSITIVE
CHLORIDE SERPL-SCNC: 100 MMOL/L (ref 98–107)
CLARITY UR: ABNORMAL
CO2 SERPL-SCNC: 29 MMOL/L (ref 22–29)
COCAINE UR QL: NEGATIVE
COLOR UR: YELLOW
CREAT SERPL-MCNC: 0.86 MG/DL (ref 0.76–1.27)
DEPRECATED RDW RBC AUTO: 37.6 FL (ref 37–54)
EOSINOPHIL # BLD AUTO: 0.02 10*3/MM3 (ref 0–0.4)
EOSINOPHIL NFR BLD AUTO: 0.2 % (ref 0.3–6.2)
ERYTHROCYTE [DISTWIDTH] IN BLOOD BY AUTOMATED COUNT: 12.5 % (ref 12.3–15.4)
GFR SERPL CREATININE-BSD FRML MDRD: 125 ML/MIN/1.73
GLOBULIN UR ELPH-MCNC: 3.1 GM/DL
GLUCOSE SERPL-MCNC: 91 MG/DL (ref 65–99)
GLUCOSE UR STRIP-MCNC: NEGATIVE MG/DL
HCT VFR BLD AUTO: 42 % (ref 37.5–51)
HGB BLD-MCNC: 13.9 G/DL (ref 13–17.7)
HGB UR QL STRIP.AUTO: NEGATIVE
HOLD SPECIMEN: NORMAL
IMM GRANULOCYTES # BLD AUTO: 0.03 10*3/MM3 (ref 0–0.05)
IMM GRANULOCYTES NFR BLD AUTO: 0.3 % (ref 0–0.5)
KETONES UR QL STRIP: NEGATIVE
LEUKOCYTE ESTERASE UR QL STRIP.AUTO: NEGATIVE
LIPASE SERPL-CCNC: 12 U/L (ref 13–60)
LYMPHOCYTES # BLD AUTO: 0.65 10*3/MM3 (ref 0.7–3.1)
LYMPHOCYTES NFR BLD AUTO: 7.5 % (ref 19.6–45.3)
MCH RBC QN AUTO: 27.1 PG (ref 26.6–33)
MCHC RBC AUTO-ENTMCNC: 33.1 G/DL (ref 31.5–35.7)
MCV RBC AUTO: 82 FL (ref 79–97)
METHADONE UR QL SCN: NEGATIVE
MONOCYTES # BLD AUTO: 0.26 10*3/MM3 (ref 0.1–0.9)
MONOCYTES NFR BLD AUTO: 3 % (ref 5–12)
NEUTROPHILS NFR BLD AUTO: 7.69 10*3/MM3 (ref 1.7–7)
NEUTROPHILS NFR BLD AUTO: 88.7 % (ref 42.7–76)
NITRITE UR QL STRIP: NEGATIVE
NRBC BLD AUTO-RTO: 0 /100 WBC (ref 0–0.2)
OPIATES UR QL: NEGATIVE
OXYCODONE UR QL SCN: NEGATIVE
PCP UR QL SCN: NEGATIVE
PH UR STRIP.AUTO: 8 [PH] (ref 5–8)
PLATELET # BLD AUTO: 254 10*3/MM3 (ref 140–450)
PMV BLD AUTO: 9.7 FL (ref 6–12)
POTASSIUM SERPL-SCNC: 4 MMOL/L (ref 3.5–5.2)
PROPOXYPH UR QL: NEGATIVE
PROT SERPL-MCNC: 8.3 G/DL (ref 6–8.5)
PROT UR QL STRIP: ABNORMAL
RBC # BLD AUTO: 5.12 10*6/MM3 (ref 4.14–5.8)
SARS-COV-2 RDRP RESP QL NAA+PROBE: NORMAL
SODIUM SERPL-SCNC: 138 MMOL/L (ref 136–145)
SP GR UR STRIP: 1.02 (ref 1–1.03)
TRICYCLICS UR QL SCN: NEGATIVE
UROBILINOGEN UR QL STRIP: ABNORMAL
WBC # BLD AUTO: 8.68 10*3/MM3 (ref 3.4–10.8)
WHOLE BLOOD HOLD SPECIMEN: NORMAL
WHOLE BLOOD HOLD SPECIMEN: NORMAL

## 2021-02-20 PROCEDURE — 85025 COMPLETE CBC W/AUTO DIFF WBC: CPT | Performed by: NURSE PRACTITIONER

## 2021-02-20 PROCEDURE — 87635 SARS-COV-2 COVID-19 AMP PRB: CPT | Performed by: NURSE PRACTITIONER

## 2021-02-20 PROCEDURE — 25010000002 METHYLPREDNISOLONE PER 125 MG: Performed by: NURSE PRACTITIONER

## 2021-02-20 PROCEDURE — 25010000003 HYDROMORPHONE 1 MG/ML SOLUTION: Performed by: NURSE PRACTITIONER

## 2021-02-20 PROCEDURE — 25010000002 ONDANSETRON PER 1 MG: Performed by: NURSE PRACTITIONER

## 2021-02-20 PROCEDURE — 80053 COMPREHEN METABOLIC PANEL: CPT | Performed by: NURSE PRACTITIONER

## 2021-02-20 PROCEDURE — 81003 URINALYSIS AUTO W/O SCOPE: CPT | Performed by: NURSE PRACTITIONER

## 2021-02-20 PROCEDURE — 96376 TX/PRO/DX INJ SAME DRUG ADON: CPT

## 2021-02-20 PROCEDURE — 99283 EMERGENCY DEPT VISIT LOW MDM: CPT

## 2021-02-20 PROCEDURE — 74177 CT ABD & PELVIS W/CONTRAST: CPT

## 2021-02-20 PROCEDURE — 80306 DRUG TEST PRSMV INSTRMNT: CPT | Performed by: NURSE PRACTITIONER

## 2021-02-20 PROCEDURE — 83690 ASSAY OF LIPASE: CPT | Performed by: NURSE PRACTITIONER

## 2021-02-20 PROCEDURE — 96375 TX/PRO/DX INJ NEW DRUG ADDON: CPT

## 2021-02-20 PROCEDURE — 25010000002 DIPHENHYDRAMINE PER 50 MG: Performed by: NURSE PRACTITIONER

## 2021-02-20 PROCEDURE — 25010000002 IOPAMIDOL 61 % SOLUTION: Performed by: NURSE PRACTITIONER

## 2021-02-20 PROCEDURE — 96374 THER/PROPH/DIAG INJ IV PUSH: CPT

## 2021-02-20 RX ORDER — ONDANSETRON 2 MG/ML
4 INJECTION INTRAMUSCULAR; INTRAVENOUS ONCE
Status: COMPLETED | OUTPATIENT
Start: 2021-02-20 | End: 2021-02-20

## 2021-02-20 RX ORDER — FAMOTIDINE 10 MG/ML
20 INJECTION, SOLUTION INTRAVENOUS ONCE
Status: COMPLETED | OUTPATIENT
Start: 2021-02-20 | End: 2021-02-20

## 2021-02-20 RX ORDER — ONDANSETRON 4 MG/1
4 TABLET, ORALLY DISINTEGRATING ORAL EVERY 6 HOURS PRN
Qty: 15 TABLET | Refills: 0 | OUTPATIENT
Start: 2021-02-20 | End: 2021-08-02

## 2021-02-20 RX ORDER — DIPHENHYDRAMINE HYDROCHLORIDE 50 MG/ML
25 INJECTION INTRAMUSCULAR; INTRAVENOUS ONCE
Status: COMPLETED | OUTPATIENT
Start: 2021-02-20 | End: 2021-02-20

## 2021-02-20 RX ORDER — METHYLPREDNISOLONE SODIUM SUCCINATE 125 MG/2ML
125 INJECTION, POWDER, LYOPHILIZED, FOR SOLUTION INTRAMUSCULAR; INTRAVENOUS ONCE
Status: COMPLETED | OUTPATIENT
Start: 2021-02-20 | End: 2021-02-20

## 2021-02-20 RX ADMIN — HYDROMORPHONE HYDROCHLORIDE 1 MG: 1 INJECTION, SOLUTION INTRAMUSCULAR; INTRAVENOUS; SUBCUTANEOUS at 16:12

## 2021-02-20 RX ADMIN — IOPAMIDOL 100 ML: 612 INJECTION, SOLUTION INTRAVENOUS at 15:52

## 2021-02-20 RX ADMIN — SODIUM CHLORIDE 1000 ML: 9 INJECTION, SOLUTION INTRAVENOUS at 14:26

## 2021-02-20 RX ADMIN — ONDANSETRON HYDROCHLORIDE 4 MG: 2 SOLUTION INTRAMUSCULAR; INTRAVENOUS at 16:12

## 2021-02-20 RX ADMIN — METHYLPREDNISOLONE SODIUM SUCCINATE 125 MG: 125 INJECTION, POWDER, FOR SOLUTION INTRAMUSCULAR; INTRAVENOUS at 14:33

## 2021-02-20 RX ADMIN — DIPHENHYDRAMINE HYDROCHLORIDE 25 MG: 50 INJECTION, SOLUTION INTRAMUSCULAR; INTRAVENOUS at 14:33

## 2021-02-20 RX ADMIN — ONDANSETRON HYDROCHLORIDE 4 MG: 2 SOLUTION INTRAMUSCULAR; INTRAVENOUS at 14:33

## 2021-02-20 RX ADMIN — FAMOTIDINE 20 MG: 10 INJECTION INTRAVENOUS at 14:33

## 2021-02-20 NOTE — ED PROVIDER NOTES
Subjective   Patient is a 30-year-old male presents emergency department with chief complaints of vomiting.  Patient is deaf and aunt who is his  is at bedside. Apparently he began vomiting around 7 AM this morning and had complaints of right lower back pain and right abdominal pain with radiating pain throughout his abdomen.  He describes the abdominal discomfort as sharp at times with cramping sensation.  Patient denies any constipation or diarrhea.  He denies any recorded fevers however reports having hot and cold flashes.  He additionally has history of MS and is followed by neurologist in [place].  Patient's aunt was uncertain if this was a MS flare, however mentions typically with his MS flares he has numbness and weakness to his legs which he denies today.  She states in the past he has had some back pain with an MS flare.  Patient denies any hematuria or dysuria.  He has had no penile discharge.  He denies any cough or congestion.  Patient states he is unable to keep anything down stating anytime he eats or drinks he has episodes of vomiting.  Due to symptoms described he came to the ER for evaluation and treatment.    Plan is to do labs, CT scan of abdomen pelvis.  Patient has a shellfish allergy which we will call radiology for further guidance.  We will provide IV fluids as well as antiemetic medication and Pepcid.    Per review patient has had 2 CAT scans in the past without issues.  Radiology recommends Benadryl as well as Solu-Medrol prior to IV contrast CT.          Review of Systems   Constitutional: Positive for chills. Negative for fever.   HENT: Negative.  Negative for congestion.    Respiratory: Negative.  Negative for cough and shortness of breath.    Cardiovascular: Negative.  Negative for chest pain.   Gastrointestinal: Positive for abdominal pain, nausea and vomiting. Negative for constipation and diarrhea.   Genitourinary: Negative.  Negative for decreased urine volume,  difficulty urinating, dysuria and hematuria.   Musculoskeletal: Positive for back pain.   All other systems reviewed and are negative.      Past Medical History:   Diagnosis Date   • Anxiety    • Deaf    • Hearing impaired    • THA virus antibody positive    • Multiple sclerosis (CMS/HCC)    • Seizure (CMS/HCC) 04/2019       Allergies   Allergen Reactions   • Shellfish-Derived Products Anaphylaxis   • Aldomet [Methyldopa] Rash     Pt denies    • Augmentin [Amoxicillin-Pot Clavulanate] Rash   • Hydrocodone Nausea And Vomiting       History reviewed. No pertinent surgical history.    Family History   Problem Relation Age of Onset   • Dementia Maternal Grandmother    • Cancer Paternal Grandmother    • Hypertension Paternal Grandmother    • Heart disease Paternal Grandfather    • Heart disease Paternal Uncle        Social History     Socioeconomic History   • Marital status: Single     Spouse name: Not on file   • Number of children: Not on file   • Years of education: Not on file   • Highest education level: Not on file   Tobacco Use   • Smoking status: Current Every Day Smoker     Types: Cigarettes   • Smokeless tobacco: Never Used   Substance and Sexual Activity   • Alcohol use: Yes     Frequency: Never     Comment: socially   • Drug use: Never   • Sexual activity: Yes     Partners: Female     Birth control/protection: Condom           Objective   Physical Exam  Vitals signs and nursing note reviewed.   Constitutional:       Appearance: Normal appearance.   HENT:      Head: Normocephalic and atraumatic.      Right Ear: External ear normal.      Left Ear: External ear normal.      Nose: Nose normal.      Mouth/Throat:      Mouth: Mucous membranes are moist.      Pharynx: Oropharynx is clear.   Eyes:      Extraocular Movements: Extraocular movements intact.      Conjunctiva/sclera: Conjunctivae normal.      Pupils: Pupils are equal, round, and reactive to light.   Neck:      Musculoskeletal: Normal range of motion.    Cardiovascular:      Rate and Rhythm: Normal rate and regular rhythm.      Pulses: Normal pulses.      Heart sounds: Normal heart sounds.   Pulmonary:      Effort: Pulmonary effort is normal.   Abdominal:      General: Abdomen is flat. There is no distension.      Palpations: Abdomen is soft.      Tenderness: There is abdominal tenderness. There is right CVA tenderness. There is no guarding.   Musculoskeletal: Normal range of motion.   Skin:     General: Skin is warm and dry.      Capillary Refill: Capillary refill takes less than 2 seconds.   Neurological:      General: No focal deficit present.      Mental Status: He is alert.   Psychiatric:         Mood and Affect: Mood normal.         Behavior: Behavior normal.         Thought Content: Thought content normal.      Comments: Patient's aunt is at bedside as his , he appears in no distress on exam         Procedures           ED Course  ED Course as of Feb 20 2154   Sat Feb 20, 2021   1635 Lab work-up Covid was negative.  Lipase is 12.  CBC and CMP are both unremarkable.  Anion gap is within normal limits at 9.  BUN and creatinine is normal at 11 and 0.86.  Hemoglobin hematocrit is normal at 13.9 and 42.  White count is normal at 8.68.  Enzymes are within normal limits ALT is 24 AST is 29.  Urinalysis is negative for ketones no indication of infection.  Urine drug screen is positive for THC.  Question if this is causing the vomiting as we often see this in the emergency department.    [TW]   1470 IMPRESSION:  1. There is a 2.5 cm slightly complex cyst arising from the upper to  midpole region of the right kidney with calcification in the posterior  wall. This previously measured 1.6 cm.  2. Limited bowel evaluation with no oral contrast given. The appendix is  not seen. There are no secondary signs of appendicitis. There is  underdistention of the stomach and portions of the colon. There is no  small bowel distention.  3. Trace amount of free fluid in  the pelvis is nonspecific.  Patient is feeling much better on reexam without any vomiting per the .  He received a dose of pain medication for his complaints of back discomfort.  He will need close follow-up with primary care physician regarding this renal cyst.  Reviewed all with his  who expressed understanding.    [TW]      ED Course User Index  [TW] Felisha Moe, LISBET                                           MDM  Number of Diagnoses or Management Options  Cannabinoid hyperemesis syndrome: new and requires workup  Nausea and vomiting, intractability of vomiting not specified, unspecified vomiting type: new and requires workup  Renal cyst: new and requires workup     Amount and/or Complexity of Data Reviewed  Clinical lab tests: ordered and reviewed  Tests in the radiology section of CPT®: ordered and reviewed  Obtain history from someone other than the patient: yes  Discuss the patient with other providers: yes    Risk of Complications, Morbidity, and/or Mortality  Presenting problems: moderate  Diagnostic procedures: moderate  Management options: moderate    Patient Progress  Patient progress: improved      Final diagnoses:   Nausea and vomiting, intractability of vomiting not specified, unspecified vomiting type   Cannabinoid hyperemesis syndrome   Renal cyst            Felisha Moe, APRN  02/20/21 4723

## 2021-02-20 NOTE — DISCHARGE INSTRUCTIONS
Previous scan noted a renal cyst measuring approx 1.6 cm to the right kidney, somewhat increased since last evaluation measuring 2.5 cm. Recommend follow up with pcp for further evaluation including possible renal ultrasound. Push fluids and advance diet as tolerated. Decrease THC use as this may be contributing to the vomiting.

## 2021-02-22 ENCOUNTER — EPISODE CHANGES (OUTPATIENT)
Dept: CASE MANAGEMENT | Facility: OTHER | Age: 31
End: 2021-02-22

## 2021-03-04 ENCOUNTER — PATIENT OUTREACH (OUTPATIENT)
Dept: CASE MANAGEMENT | Facility: OTHER | Age: 31
End: 2021-03-04

## 2021-03-04 NOTE — OUTREACH NOTE
Care Coordination Note    [employer] patient seen at [facility] ED 2-20-21 for vomiting. ACM attempted outreach per algorithm and was unsuccessful with the 4 attempts. Will route note per the required courtesy notification.    Merissa Mckeon RN  Ambulatory   [phone]    3/4/2021, 12:52 CST

## 2021-08-02 ENCOUNTER — HOSPITAL ENCOUNTER (EMERGENCY)
Facility: HOSPITAL | Age: 31
Discharge: HOME | End: 2021-08-02
Attending: EMERGENCY MEDICINE | Admitting: EMERGENCY MEDICINE

## 2021-08-02 VITALS
OXYGEN SATURATION: 98 % | BODY MASS INDEX: 22.9 KG/M2 | SYSTOLIC BLOOD PRESSURE: 119 MMHG | HEART RATE: 86 BPM | TEMPERATURE: 98.5 F | DIASTOLIC BLOOD PRESSURE: 73 MMHG | WEIGHT: 160 LBS | HEIGHT: 70 IN | RESPIRATION RATE: 18 BRPM

## 2021-08-02 DIAGNOSIS — M77.8 TENDONITIS OF BOTH ELBOWS: Primary | ICD-10-CM

## 2021-08-02 PROCEDURE — 99282 EMERGENCY DEPT VISIT SF MDM: CPT

## 2021-08-02 RX ORDER — PREDNISONE 20 MG/1
20 TABLET ORAL 2 TIMES DAILY
Qty: 14 TABLET | Refills: 0 | Status: SHIPPED | OUTPATIENT
Start: 2021-08-02 | End: 2022-10-04 | Stop reason: ALTCHOICE

## 2021-08-02 RX ORDER — INDOMETHACIN 50 MG/1
50 CAPSULE ORAL
Qty: 30 CAPSULE | Refills: 0 | Status: SHIPPED | OUTPATIENT
Start: 2021-08-02 | End: 2022-05-03

## 2021-08-02 NOTE — ED PROVIDER NOTES
Subjective   Patient complains of bilateral elbow pain for about a month.  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  Any flexion of the elbows causes a lot of pain.  The pain is lateral and not medial.  There is no click or deformity or erythema.  He also says he has multiple sclerosis and it irritates things.  Patient is deaf and information is obtained by writing notes on iPad and we communicate very effectively with that.      History provided by:  Patient   used: No    Elbow Pain  Location:  Elbow  Elbow location:  L elbow and R elbow  Injury: no    Pain details:     Quality:  Aching    Radiates to:  Does not radiate    Severity:  Moderate    Onset quality:  Gradual    Duration:  1 month    Timing:  Constant    Progression:  Unchanged  Handedness:  Right-handed  Dislocation: no    Foreign body present:  No foreign bodies  Tetanus status:  Unknown  Prior injury to area:  No  Relieved by:  Nothing  Worsened by:  Movement  Ineffective treatments:  None tried  Associated symptoms: no back pain, no decreased range of motion, no fatigue, no fever, no muscle weakness, no neck pain, no numbness, no stiffness, no swelling and no tingling    Risk factors: no concern for non-accidental trauma, no known bone disorder, no frequent fractures and no recent illness        Review of Systems   Constitutional: Negative.  Negative for fatigue and fever.   HENT: Negative.    Respiratory: Negative.    Cardiovascular: Negative.    Gastrointestinal: Negative.    Genitourinary: Negative.    Musculoskeletal: Positive for arthralgias. Negative for back pain, neck pain and stiffness.   Skin: Negative.    Neurological: Negative.    Psychiatric/Behavioral: Negative.    All other systems reviewed and are negative.      Past Medical History:   Diagnosis Date   • Anxiety    • Deaf    • Hearing impaired    • THA  virus antibody positive    • Multiple sclerosis (CMS/HCC)    • Seizure (CMS/HCC) 04/2019       Allergies   Allergen Reactions   • Shellfish-Derived Products Anaphylaxis   • Aldomet [Methyldopa] Rash     Pt denies    • Augmentin [Amoxicillin-Pot Clavulanate] Rash   • Hydrocodone Nausea And Vomiting       History reviewed. No pertinent surgical history.    Family History   Problem Relation Age of Onset   • Dementia Maternal Grandmother    • Cancer Paternal Grandmother    • Hypertension Paternal Grandmother    • Heart disease Paternal Grandfather    • Heart disease Paternal Uncle        Social History     Socioeconomic History   • Marital status: Single     Spouse name: Not on file   • Number of children: Not on file   • Years of education: Not on file   • Highest education level: Not on file   Tobacco Use   • Smoking status: Current Every Day Smoker     Types: Cigarettes   • Smokeless tobacco: Never Used   Substance and Sexual Activity   • Alcohol use: Yes     Comment: socially   • Drug use: Never   • Sexual activity: Yes     Partners: Female     Birth control/protection: Condom       Prior to Admission medications    Medication Sig Start Date End Date Taking? Authorizing Provider   cholecalciferol (VITAMIN D3) 1000 units tablet Take 1 tablet by mouth Daily. 2/21/19   Berta Oconnor APRN   hydrOXYzine (ATARAX) 25 MG tablet Take 1 tablet by mouth At Night As Needed (insomnia). 12/6/18   Leela Velásquez APRN   Ocrelizumab (OCREVUS IV) Infuse  into a venous catheter Every 6 (Six) Months.    Provider, MD Hema   ondansetron (ZOFRAN) 4 MG tablet Take 1 tablet by mouth Every 6 (Six) Hours. 12/20/18   Edmundo Clemons MD   ondansetron ODT (ZOFRAN-ODT) 4 MG disintegrating tablet Place 1 tablet on the tongue Every 6 (Six) Hours As Needed for Nausea. 2/20/21   Felisha Moe APRN       Medications - No data to display    Vitals:    08/02/21 1057   BP: 119/73   Pulse: 86   Resp: 18   Temp: 98.5 °F (36.9 °C)    SpO2: 98%         Objective   Physical Exam  Vitals and nursing note reviewed.   Constitutional:       Appearance: Normal appearance.   Musculoskeletal:         General: Tenderness present. Normal range of motion.      Comments: Patient has full range of motion in both elbows without any click or deformity or heat or erythema.  He is tender to palpation on the lateral aspect of the elbow with supination and pronation.  He is also tender with flexion extension.  He has good distal pulses and sensation.  He has no proximal injury.   Skin:     General: Skin is warm and dry.   Neurological:      General: No focal deficit present.      Mental Status: He is alert and oriented to person, place, and time.   Psychiatric:         Mood and Affect: Mood normal.         Behavior: Behavior normal.         Procedures         Lab Results (last 24 hours)     ** No results found for the last 24 hours. **          No orders to display       ED Course  ED Course as of Aug 02 1132   Mon Aug 02, 2021   1130 I told the patient this is consistent with tendinitis and elbows similar to tennis elbow.  We can treat this nothing we can get a better but I did warn him it may return with his work.  He understands that.  He is discharged in stable condition.    [TR]      ED Course User Index  [TR] Sterling Johns Jr., MD          MDM  Number of Diagnoses or Management Options  Tendonitis of both elbows: new and does not require workup  Risk of Complications, Morbidity, and/or Mortality  Presenting problems: moderate  Diagnostic procedures: minimal  Management options: moderate    Patient Progress  Patient progress: stable      Final diagnoses:   Tendonitis of both elbows          Sterling Johns Jr., MD  08/02/21 1132

## 2021-08-03 ENCOUNTER — PATIENT OUTREACH (OUTPATIENT)
Dept: CASE MANAGEMENT | Facility: OTHER | Age: 31
End: 2021-08-03

## 2021-08-03 NOTE — OUTREACH NOTE
"Ambulatory Case Management Note    Patient Outreach    Barrie DEL ANGEL ABRIL patient seen at [facility] ED 8.2.21 for tendonitis of bilateral elbows. ACM called number listed as primary on chart [phone] and the gentleman that answered stated \"you have the wrong number\". ACM will attempt the secondary number [phone].    Patient Outreach    Called the [phone] listed on chart and it is a  service. ACM left message for patient to return call.     Patient Outreach    Received a return call from the  service that stated they had a Gena Lacy on the line. ACM requested to speak with Mr. Hall. The  indicated that Ms. Richard was relaying Mr. Hall is no longer \"in the picture and to remove the number\".     Merissa Mckeon RN  Ambulatory Case Management    8/3/2021, 13:29 CDT    "

## 2021-11-27 ENCOUNTER — HOSPITAL ENCOUNTER (EMERGENCY)
Facility: HOSPITAL | Age: 31
Discharge: HOME | End: 2021-11-27
Attending: EMERGENCY MEDICINE | Admitting: EMERGENCY MEDICINE

## 2021-11-27 VITALS
RESPIRATION RATE: 18 BRPM | OXYGEN SATURATION: 100 % | HEIGHT: 69 IN | BODY MASS INDEX: 23.99 KG/M2 | HEART RATE: 66 BPM | WEIGHT: 162 LBS | TEMPERATURE: 98.5 F | DIASTOLIC BLOOD PRESSURE: 78 MMHG | SYSTOLIC BLOOD PRESSURE: 128 MMHG

## 2021-11-27 DIAGNOSIS — Z20.822 ENCOUNTER FOR LABORATORY TESTING FOR COVID-19 VIRUS: ICD-10-CM

## 2021-11-27 DIAGNOSIS — R43.2 LOSS OF TASTE: Primary | ICD-10-CM

## 2021-11-27 LAB — SARS-COV-2 RNA PNL SPEC NAA+PROBE: DETECTED

## 2021-11-27 PROCEDURE — 93005 ELECTROCARDIOGRAM TRACING: CPT

## 2021-11-27 PROCEDURE — 87635 SARS-COV-2 COVID-19 AMP PRB: CPT | Performed by: EMERGENCY MEDICINE

## 2021-11-27 PROCEDURE — 99283 EMERGENCY DEPT VISIT LOW MDM: CPT

## 2021-11-27 PROCEDURE — C9803 HOPD COVID-19 SPEC COLLECT: HCPCS

## 2021-11-28 ENCOUNTER — TELEPHONE (OUTPATIENT)
Dept: EMERGENCY DEPT | Facility: HOSPITAL | Age: 31
End: 2021-11-28

## 2021-11-29 ENCOUNTER — TELEPHONE (OUTPATIENT)
Dept: EMERGENCY DEPT | Facility: HOSPITAL | Age: 31
End: 2021-11-29

## 2022-03-23 ENCOUNTER — TELEPHONE (OUTPATIENT)
Dept: NEUROLOGY | Facility: CLINIC | Age: 32
End: 2022-03-23

## 2022-03-23 NOTE — TELEPHONE ENCOUNTER
Caller: Shanice Whitney    Relationship: Emergency Contact    Best call back number: [phone]    Who are you requesting to speak with (clinical staff, provider,  specific staff member):   DR PLASCENCIA    What was the call regarding:  PT'S GRANDMOTHER CALLED SAYING PT CANNOT GET TRANSPORTATION TO [place] FOR TREATMENTS BUT DIDN'T KNOW WHAT TREATMENTS BUT GAVE ME THIS PHONE -[phone]. I CALLED AND SPOKE WITH EVELIN AT [facility] [place] INFUSION SHE SAID PT NEEDS NEW ORDERS TO BE SEEN THERE SINCE PT HAS NOT BEEN SEEN IN OVER A YEAR.    SHANICE STATED THE PT DOESN'T HAVE TRANSPORTATION TO GET TO [place].     TOLD EVELIN IT'S BEEN OVER 3 YEARS SINCE THE PT WAS SEEN HERE AND WILL NEED SEEN AS A NEW PT.    EVELIN GAVE ME DR JACKSON [phone] WHO THE PT HAD BEEN SEEING AND CONFIRMED SHANICE HAD THE NUMBER. TOLD HER TO CALL DR JACKSON. SHE SAID SHE WOULD.

## 2022-04-04 ENCOUNTER — OFFICE VISIT (OUTPATIENT)
Dept: FAMILY MEDICINE CLINIC | Facility: CLINIC | Age: 32
End: 2022-04-04

## 2022-04-04 VITALS
TEMPERATURE: 98 F | DIASTOLIC BLOOD PRESSURE: 76 MMHG | SYSTOLIC BLOOD PRESSURE: 128 MMHG | HEIGHT: 69 IN | HEART RATE: 67 BPM | WEIGHT: 164 LBS | OXYGEN SATURATION: 98 % | BODY MASS INDEX: 24.29 KG/M2

## 2022-04-04 DIAGNOSIS — G35 MULTIPLE SCLEROSIS: ICD-10-CM

## 2022-04-04 DIAGNOSIS — M51.26 LUMBAR DISC HERNIATION: Primary | ICD-10-CM

## 2022-04-04 PROCEDURE — 99214 OFFICE O/P EST MOD 30 MIN: CPT | Performed by: FAMILY MEDICINE

## 2022-04-04 RX ORDER — TIZANIDINE 4 MG/1
4 TABLET ORAL EVERY 8 HOURS PRN
Qty: 30 TABLET | Refills: 0 | Status: SHIPPED | OUTPATIENT
Start: 2022-04-04

## 2022-04-04 RX ORDER — IBUPROFEN 800 MG/1
800 TABLET ORAL EVERY 8 HOURS PRN
Qty: 90 TABLET | Refills: 0 | Status: SHIPPED | OUTPATIENT
Start: 2022-04-04 | End: 2022-05-03

## 2022-04-04 NOTE — PROGRESS NOTES
"Chief Complaint  Establish Care (Lower back pain, current pain 2/10. ), Multiple Sclerosis (Requesting referral to neurology ), and Hearing Problem    Subjective          Deangelo Hall presents to [facility]  History of Present Illness  Patient is not been seen since 4/5/2019  Would like to have referral to see a neurologist closer than [place] for MS symptoms  He currently complains of chronic low back pain across the midline of his back without radicular symptoms usually worse by lifting things at work.  He does have a history of lumbar disc herniation, says that his pain is severe enough that it limits his ability to work at this point  ROS otherwise neg    Objective   Vital Signs:   /76 (BP Location: Left arm, Patient Position: Sitting, Cuff Size: Adult)   Pulse 67   Temp 98 °F (36.7 °C) (Temporal)   Ht 175.3 cm (69\")   Wt 74.4 kg (164 lb)   SpO2 98%   BMI 24.22 kg/m²     BMI is within normal parameters. No follow-up required.      Physical Exam  Vitals and nursing note reviewed.   Constitutional:       General: He is not in acute distress.     Appearance: He is not diaphoretic.      Comments: Deaf, needs    HENT:      Head: Normocephalic and atraumatic.      Nose: Nose normal.   Eyes:      General: No scleral icterus.        Right eye: No discharge.         Left eye: No discharge.      Conjunctiva/sclera: Conjunctivae normal.   Neck:      Trachea: No tracheal deviation.   Pulmonary:      Effort: Pulmonary effort is normal.   Musculoskeletal:      Comments: Hypertonic lumbar paraspinals     Skin:     General: Skin is warm and dry.      Coloration: Skin is not pale.   Neurological:      Mental Status: He is alert and oriented to person, place, and time. Mental status is at baseline.      Comments: Normal strength, sensation, reflexes LE b/l   Psychiatric:         Behavior: Behavior normal.         Thought Content: Thought content normal.  "        Judgment: Judgment normal.        Result Review :                 Assessment and Plan    Diagnoses and all orders for this visit:    1. Lumbar disc herniation (Primary)  -     MRI Lumbar Spine Without Contrast; Future  -     Ambulatory Referral to Physical Therapy Evaluate and treat  -     tiZANidine (ZANAFLEX) 4 MG tablet; Take 1 tablet by mouth Every 8 (Eight) Hours As Needed for Muscle Spasms.  Dispense: 30 tablet; Refill: 0  -     ibuprofen (ADVIL,MOTRIN) 800 MG tablet; Take 1 tablet by mouth Every 8 (Eight) Hours As Needed for Moderate Pain .  Dispense: 90 tablet; Refill: 0    2. Multiple sclerosis (HCC)  -     Ambulatory Referral to Neurology    reimage MRI  NSAID/muscle relaxer above with PT referral  Neuro referral  F/u PRN

## 2022-04-08 ENCOUNTER — HOSPITAL ENCOUNTER (OUTPATIENT)
Dept: MRI IMAGING | Facility: HOSPITAL | Age: 32
Discharge: HOME | End: 2022-04-08
Admitting: FAMILY MEDICINE

## 2022-04-08 DIAGNOSIS — M51.26 LUMBAR DISC HERNIATION: ICD-10-CM

## 2022-04-08 PROCEDURE — 72148 MRI LUMBAR SPINE W/O DYE: CPT

## 2022-04-09 DIAGNOSIS — N28.1 RENAL CYST: Primary | ICD-10-CM

## 2022-04-15 ENCOUNTER — HOSPITAL ENCOUNTER (OUTPATIENT)
Dept: ULTRASOUND IMAGING | Facility: HOSPITAL | Age: 32
Discharge: HOME | End: 2022-04-15
Admitting: FAMILY MEDICINE

## 2022-04-15 DIAGNOSIS — N28.1 RENAL CYST: ICD-10-CM

## 2022-04-15 PROCEDURE — 76775 US EXAM ABDO BACK WALL LIM: CPT

## 2022-05-01 DIAGNOSIS — M51.26 LUMBAR DISC HERNIATION: ICD-10-CM

## 2022-05-02 NOTE — TELEPHONE ENCOUNTER
Rx Refill Note  Requested Prescriptions     Pending Prescriptions Disp Refills   • ibuprofen (ADVIL,MOTRIN) 800 MG tablet [Pharmacy Med Name: IBUPROFEN 800 MG TABLET] 90 tablet 0     Sig: TAKE 1 TABLET BY MOUTH EVERY 8 HOURS AS NEEDED FOR MODERATE PAIN      Last office visit with prescribing clinician: 4/4/2022      Next office visit with prescribing clinician: Visit date not found            OMID Forbes  05/02/22, 08:25 CDT

## 2022-05-03 RX ORDER — IBUPROFEN 800 MG/1
TABLET ORAL
Qty: 90 TABLET | Refills: 0 | Status: SHIPPED | OUTPATIENT
Start: 2022-05-03

## 2022-06-30 NOTE — TELEPHONE ENCOUNTER
Needing a referral to ENT to see if he can get replacement for hearing aids.   [FreeTextEntry1] : COVID-19: No risk factors for severe disease. Discussed supportive care. Start promethazine PRN. Instructed her to go to ED if she had CP or dyspnea. Follow-up 1 week if symptoms persist. \par

## 2022-10-04 ENCOUNTER — OFFICE VISIT (OUTPATIENT)
Dept: NEUROLOGY | Facility: CLINIC | Age: 32
End: 2022-10-04

## 2022-10-04 ENCOUNTER — DOCUMENTATION (OUTPATIENT)
Dept: NEUROLOGY | Facility: CLINIC | Age: 32
End: 2022-10-04

## 2022-10-04 VITALS
HEART RATE: 68 BPM | BODY MASS INDEX: 22.66 KG/M2 | RESPIRATION RATE: 18 BRPM | DIASTOLIC BLOOD PRESSURE: 72 MMHG | WEIGHT: 153 LBS | HEIGHT: 69 IN | SYSTOLIC BLOOD PRESSURE: 132 MMHG

## 2022-10-04 DIAGNOSIS — G35 MS (MULTIPLE SCLEROSIS): Primary | ICD-10-CM

## 2022-10-04 PROCEDURE — 99205 OFFICE O/P NEW HI 60 MIN: CPT | Performed by: PSYCHIATRY & NEUROLOGY

## 2022-10-04 NOTE — PROGRESS NOTES
I did contact the patient's , Ms. Stanley.  She is going to contact the patient and have him come up to our office to sign his form for Ocrevus.  This will be in the am.

## 2022-10-04 NOTE — PROGRESS NOTES
Subjective   Deangelo Hall, 1990, is a male who is being seen today for   Chief Complaint   Patient presents with   • Multiple Sclerosis       HISTORY OF PRESENT ILLNESS: Previously seen in 2019 for the diagnosis of MS with 6 oligoclonal bands on lumbar puncture and symptoms affecting the left side with numbness and tingling.  Patient has had follow-up at that time with [facility] MS clinic Dr. Cheatham.  Patient stopped going to see him in September last year with his last infusion then.  He missed his Ocrevus infusion in March.  He does not think he has had any exacerbations of MS since then and would like to get back on the Ocrevus.  Patient's last MRI brain/cervical spine was in 2020 at another facility.  No CD is available for that.  Patient had no new lesions.  There were right periventricular lesion adjacent to the right frontal horn more discrete than on the prior study but unchanged in location and no enhancing lesions.  Overall mild lesion burden.  Patient has unchanged cerebral volume loss and callosal volume loss with scattered T1 hypointensity is unchanged.  The cervical spine MRI shows no lesions.  There are some C3-4 and C 4- 5 degenerative changes.  At C3-4 there is some foraminal narrowing to the right and at C4/C5 bilateral neuroforaminal narrowing.  No spinal stenosis.  Patient states his balance at times is a little poor but he has not had any falls.  Denies any headaches except when he plays basketball.  Patient denies any Lhermitte's phenomena.  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  I discussed with him in detail driving precautions and safety precautions of caution climbing and using sharp cutting tools and work around hot fire/stove/grill/water.  Patient is totally deaf and we used the  ID [id number] Celia to do the examination and history.  We are contacting [facility] to determine what is needed to restart the Ocrevus after missing  the dose in March.  Patient is THA virus positive    REVIEW OF SYSTEMS:   GENERAL: Blood pressure 130/70 left arm seated and 132/72 left arm standing.  PULMONARY: No acute respiratory difficulty  CVS: No acute chest pain or palpitation  GASTROINTESTINAL: No acute GI distress  GENITOURINARY: Patient's MRI of the lumbar spine show old right cyst of the kidney.  GYN: Not applicable  MUSCULOSKELETAL: Patient has low back pain but the MRI of the lumbar spine showed minimal degenerative process no herniated disc or foraminal being followed by PCP encroachment.  HEENT: No acute vision difficulties  ENDOCRINE: Not diabetic  PSYCHIATRIC: No acute psychiatric symptoms  HEMATOLOGY: No recent blood work for review  SKIN: No acute skin changes  Family history reviewed and otherwise noncontributory to this problem/ patient has a maternal grandmother with dementia  Social history: Patient denies smoking, does use alcohol and marijuana      PHYSICAL EXAMINATION:    GENERAL: No acute distress   CRANIUM: Normal cephalic/atraumatic  HEENT:.  Set       EYES: Tympanic membranes normal and no EOM abnormalities.  No acute fundic abnormalities.  Pupils are ground reactive to light.       EARS: Cannot hear tuning fork.  Patient tympanic membranes partially obscured by wax bilaterally       THROAT: No acute oropharynx abnormalities no swallowing difficulties by history       NECK: No bruits/no lymphadenopathy  CHEST: No acute cardiopulmonary abnormalities by auscultation  ABDOMEN: Nondistended  EXTREMITIES: Dorsalis pedis pulse symmetrical  NEURO: Patient alert and follows commands without difficulty  SPEECH: As above    CRANIAL NERVES: Motor/sensory about the face normal and symmetric    MOTOR STRENGTH: Motor strength upper and lower extremities normal  STATION AND GAIT: Gait normal/Romberg negative/tandem walk done without difficulty  CEREBELLAR: Finger-nose and heel-to-shin normal  SENSORY: Pin and vibration upper and lower extremities  normal  REFLEXES: Reflexes present symmetric upper and lower extremity without clonus or Babinski      ASSESSMENT AND PLAN: Patient with history of MS getting MRI brain with and without contrast and MRI cervical with and without contrast and baseline blood work and starting the Ocrevus again.  He says he had no side effects from it.  He said he had no side effects from the contrast-enhancement.  I did warn him about potential risks of contrast enhancement and of starting the medication.  Patient is to continue safety and driving precautions as discussed..  I spent 60 minutes with this patient with counseling exam and review of records.  BMI in normal range for age.      Diagnoses and all orders for this visit:    1. MS (multiple sclerosis) (HCC) (Primary)  -     CBC & Differential; Future  -     Comprehensive Metabolic Panel; Future  -     Lipid Panel; Future  -     Magnesium; Future  -     Vitamin B12; Future  -     Folate; Future  -     T4, Free; Future  -     Sedimentation Rate; Future  -     Stratify JCV(TM) Ab w/ Index ([facility]); Future        Dictated utilizing Dragon voice recognition software

## 2022-10-04 NOTE — PATIENT INSTRUCTIONS
Patient to get to emergency room immediately if MS exacerbations  occurs.  Patient have driving and safety precautions as discussed.  Patient to work with PCP about back problems

## 2022-10-07 DIAGNOSIS — G35 MS (MULTIPLE SCLEROSIS): Primary | ICD-10-CM

## 2022-10-25 ENCOUNTER — DOCUMENTATION (OUTPATIENT)
Dept: NEUROLOGY | Facility: CLINIC | Age: 32
End: 2022-10-25

## 2022-10-25 NOTE — PROGRESS NOTES
OCREVUS IS IN REVIEW WITH [facility]. THEY HAVE A 60 HR TURN AROUND TIME; THEY WILL FAX DECISION [phone].    CALL REF/PENDING AUTH# [id number]    CHECK STATUS  # [phone]

## 2022-10-26 ENCOUNTER — TELEPHONE (OUTPATIENT)
Dept: NEUROLOGY | Facility: CLINIC | Age: 32
End: 2022-10-26

## 2022-10-26 NOTE — TELEPHONE ENCOUNTER
MEDICAL REVIEW CALLING WITH QUESTION ON PATIENTS OCREVUS INJECTION.  IS THIS BEING DONE IN OFFICE OR HOME?    PLEASE ADVISE.    ELIZABETH  [phone]  EXT [phone]    THANK YOU

## 2022-10-31 ENCOUNTER — TELEPHONE (OUTPATIENT)
Dept: NEUROLOGY | Facility: CLINIC | Age: 32
End: 2022-10-31

## 2022-10-31 NOTE — TELEPHONE ENCOUNTER
SPOKE WITH GRANDMOTHER, SHANICE PETER, AND CONFIRMED MIKAEL'S INFUSION APPT. OCREVUS INFUSION SCHEDULED ON (Thursday) 11/03/2022 @ 10:00, HE WILL NEED TO ARRIVE @ 9:30 TO REGISTER & COMPLETE ORDERED LABS PRIOR TO STARTING INFUSION.    MS. GREEN NOTED THE DATE/TIME AND VOICED AGREEMENT/UNDERSTANDING.

## 2022-10-31 NOTE — TELEPHONE ENCOUNTER
SPOKE WITH MS. GREEN, AGAIN, TO LET HER KNOW THAT DR. PLASCENCIA HAS CANCELLED THE INFUSION FOR Thursday. MIKAEL NEEDS TO HAVE THE LABS COMPLETED BEFORE THE INFUSION & SOME WOULD NOT BE RESULTED AS SOON AS NEEDED TO START TREATMENT. MS. GREEN UNDERSTOOD THAT MIKAEL NEEDS TO HAVE THE LABS FASTING AND ASAP. AFTER DR. PLASCENCIA HAS THE RESULTS, WE WILL THEN SCHEDULE THE INFUSION.

## 2022-11-01 ENCOUNTER — HOSPITAL ENCOUNTER (EMERGENCY)
Facility: HOSPITAL | Age: 32
Discharge: HOME | End: 2022-11-01
Attending: EMERGENCY MEDICINE | Admitting: EMERGENCY MEDICINE

## 2022-11-01 VITALS
OXYGEN SATURATION: 99 % | DIASTOLIC BLOOD PRESSURE: 72 MMHG | WEIGHT: 160 LBS | HEART RATE: 72 BPM | TEMPERATURE: 98.2 F | HEIGHT: 70 IN | BODY MASS INDEX: 22.9 KG/M2 | RESPIRATION RATE: 16 BRPM | SYSTOLIC BLOOD PRESSURE: 118 MMHG

## 2022-11-01 DIAGNOSIS — L08.9 SKIN INFECTION: Primary | ICD-10-CM

## 2022-11-01 PROCEDURE — 96372 THER/PROPH/DIAG INJ SC/IM: CPT

## 2022-11-01 PROCEDURE — 99282 EMERGENCY DEPT VISIT SF MDM: CPT

## 2022-11-01 PROCEDURE — 99283 EMERGENCY DEPT VISIT LOW MDM: CPT

## 2022-11-01 RX ORDER — CLINDAMYCIN PHOSPHATE 150 MG/ML
600 INJECTION, SOLUTION INTRAVENOUS ONCE
Status: DISCONTINUED | OUTPATIENT
Start: 2022-11-01 | End: 2022-11-01 | Stop reason: HOSPADM

## 2022-11-01 RX ORDER — CLINDAMYCIN HYDROCHLORIDE 300 MG/1
300 CAPSULE ORAL EVERY 6 HOURS
Qty: 40 CAPSULE | Refills: 0 | Status: SHIPPED | OUTPATIENT
Start: 2022-11-01 | End: 2022-11-11

## 2022-11-01 RX ADMIN — CLINDAMYCIN PHOSPHATE 300 MG: 150 INJECTION, SOLUTION INTRAMUSCULAR; INTRAVENOUS at 10:02

## 2022-11-01 NOTE — ED PROVIDER NOTES
Subjective   History of Present Illness  Information obtained via  line as he is deaf    32 yom with PMH of MS and seizures presents with c/o possible insect bite.  He states he noticed a small white 'pimple' above the right eyebrow approximately two weeks ago. He states he picked at it and since that time it has been larger and swollen.  He is unsure if he was bit by something.  He denies visual changes or any eye involvement.  He denies fever. He denies drainage.  He denies headache or neck pain/stiffness.          Review of Systems   Constitutional: Negative for activity change, appetite change, fatigue and fever.   HENT: Positive for facial swelling. Negative for congestion, ear pain and sore throat.    Eyes: Negative for discharge and visual disturbance.   Respiratory: Negative for apnea, chest tightness, shortness of breath, wheezing and stridor.    Cardiovascular: Negative for chest pain and palpitations.   Gastrointestinal: Negative for abdominal distention, abdominal pain, diarrhea, nausea and vomiting.   Genitourinary: Negative for difficulty urinating and dysuria.   Musculoskeletal: Negative for arthralgias and myalgias.   Skin: Negative for rash and wound.        Scabbed area above the right eyebrow   Neurological: Negative for dizziness and seizures.   Psychiatric/Behavioral: Negative for agitation and confusion.       Past Medical History:   Diagnosis Date   • Anxiety    • Deaf    • Hearing impaired    • THA virus antibody positive    • Multiple sclerosis (HCC)    • Seizure (HCC) 04/2019       Allergies   Allergen Reactions   • Shellfish-Derived Products Anaphylaxis   • Aldomet [Methyldopa] Rash     Pt denies    • Augmentin [Amoxicillin-Pot Clavulanate] Rash   • Hydrocodone Nausea And Vomiting       History reviewed. No pertinent surgical history.    Family History   Problem Relation Age of Onset   • Dementia Maternal Grandmother    • Cancer Paternal Grandmother    • Hypertension Paternal  Grandmother    • Heart disease Paternal Grandfather    • Heart disease Paternal Uncle        Social History     Socioeconomic History   • Marital status: Single   Tobacco Use   • Smoking status: Former     Types: Cigarettes   • Smokeless tobacco: Never   Vaping Use   • Vaping Use: Former   Substance and Sexual Activity   • Alcohol use: Yes     Comment: socially   • Drug use: Yes     Types: Marijuana   • Sexual activity: Yes     Partners: Female     Birth control/protection: Condom           Objective   Physical Exam  Vitals and nursing note reviewed.   Constitutional:       General: He is not in acute distress.     Appearance: He is well-developed. He is not ill-appearing or toxic-appearing.   HENT:      Head: Normocephalic.        Comments: There is slight swelling present above the right eyebrow. There is a scabbed area in the affected area.  There is no drainage.    Eyes:      General: Lids are normal.      Conjunctiva/sclera:      Right eye: No hemorrhage.     Pupils: Pupils are equal, round, and reactive to light.      Comments: There is no redness or swelling to the R eyelid or eye.     Neck:      Meningeal: Brudzinski's sign and Kernig's sign absent.   Cardiovascular:      Rate and Rhythm: Normal rate and regular rhythm.      Heart sounds: No murmur heard.  Pulmonary:      Effort: Pulmonary effort is normal.      Breath sounds: Normal breath sounds.   Abdominal:      General: Bowel sounds are normal.      Palpations: Abdomen is soft.   Musculoskeletal:         General: Normal range of motion.      Cervical back: Normal range of motion and neck supple. No rigidity.   Lymphadenopathy:      Cervical: No cervical adenopathy.   Skin:     General: Skin is warm and dry.   Neurological:      Mental Status: He is alert and oriented to person, place, and time.         Procedures           ED Course  ED Course as of 11/01/22 1616   e Nov 01, 2022   0955 All information obtained/relayed via  line as he is  deaf.  The patient and I discussed plan of care.  He has no eye involvement or visual changes.  It does not appear to be a periorbital cellulitis.  I discussed all instructions with him.  He was encouraged to ask questions via the .  Per the , he signed understand of all instructions [KS]      ED Course User Index  [KS] Kristin Gaytan APRN                                           MDM  Number of Diagnoses or Management Options  Skin infection: minor  Risk of Complications, Morbidity, and/or Mortality  Presenting problems: minimal  Diagnostic procedures: minimal  Management options: minimal    Patient Progress  Patient progress: stable      Final diagnoses:   Skin infection       ED Disposition  ED Disposition     ED Disposition   Discharge    Condition   Stable    Comment   --             Ildefonso Lawler, DO  [address] [id number] [phone]    Call today  Routine ED follow up         Medication List      New Prescriptions    clindamycin 300 MG capsule  Commonly known as: CLEOCIN  Take 1 capsule by mouth Every 6 (Six) Hours for 10 days.     mupirocin 2 % ointment  Commonly known as: BACTROBAN  Apply 1 application topically to the appropriate area as directed 3 (Three) Times a Day for 7 days.           Where to Get Your Medications      These medications were sent to [facility]/pharmacy #[id number] - [place], [place] - [address] AT ACROSS FROM [address] - [phone]  - [phone]   [address] [facility] [place] [address]    Hours: 24-hours Phone: [phone]   · clindamycin 300 MG capsule  · mupirocin 2 % ointment          Kristin Gaytan APRN  11/01/22 4394

## 2022-11-01 NOTE — DISCHARGE INSTRUCTIONS
Drink plenty of fluid.  Medication as ordered. Tylenol or motrin as needed for pain.  Wash area 2-3 times a day with antibacterials soap and apply ointment.  Warm compresses to area.  Follow up with a PCP - call today for appointment. Return to ED if condition does not improve or worsens

## 2022-11-03 ENCOUNTER — APPOINTMENT (OUTPATIENT)
Dept: LAB | Facility: HOSPITAL | Age: 32
End: 2022-11-03

## 2022-11-03 ENCOUNTER — APPOINTMENT (OUTPATIENT)
Dept: ONCOLOGY | Facility: HOSPITAL | Age: 32
End: 2022-11-03

## 2022-11-14 ENCOUNTER — DOCUMENTATION (OUTPATIENT)
Dept: NEUROLOGY | Facility: CLINIC | Age: 32
End: 2022-11-14

## 2022-11-14 NOTE — PROGRESS NOTES
MIKAEL'S OCREVUS IS APPROVED    OCREVUS 600MG    VALID: 10/31/2022 - 10/30/2023    PA#  [id number]    WAITING FOR HARD COPY TO SCAN

## 2022-11-22 ENCOUNTER — TELEPHONE (OUTPATIENT)
Dept: NEUROLOGY | Facility: CLINIC | Age: 32
End: 2022-11-22

## 2022-11-22 NOTE — TELEPHONE ENCOUNTER
Caller: KWABENA MARRERO/ [employer]    Relationship: N/A     Best call back number: [phone]      What is the best time to reach you: ANY    Who are you requesting to speak with (clinical staff, provider,  specific staff member): ASH    Do you know the name of the person who called: ASH    What was the call regarding: KWABENA MARRERO/ [employer] TELEPHONE RE: PATIENTS OCREVUS THAT WAS APPROVED. SHE IS WANTING TO KNOW THE STATUS & IF P.A. WAS SUBMITTED WITH THE CORRECT DOSAGE     PA#  [id number]

## 2022-11-23 ENCOUNTER — DOCUMENTATION (OUTPATIENT)
Dept: NEUROLOGY | Facility: CLINIC | Age: 32
End: 2022-11-23

## 2022-11-23 ENCOUNTER — LAB (OUTPATIENT)
Dept: LAB | Facility: HOSPITAL | Age: 32
End: 2022-11-23

## 2022-11-23 DIAGNOSIS — G35 MS (MULTIPLE SCLEROSIS): ICD-10-CM

## 2022-11-23 LAB
ALBUMIN SERPL-MCNC: 4.8 G/DL (ref 3.5–5.2)
ALBUMIN/GLOB SERPL: 1.7 G/DL
ALP SERPL-CCNC: 71 U/L (ref 39–117)
ALT SERPL W P-5'-P-CCNC: 19 U/L (ref 1–41)
ANION GAP SERPL CALCULATED.3IONS-SCNC: 6.5 MMOL/L (ref 5–15)
AST SERPL-CCNC: 29 U/L (ref 1–40)
BASOPHILS # BLD AUTO: 0.02 10*3/MM3 (ref 0–0.2)
BASOPHILS NFR BLD AUTO: 0.3 % (ref 0–1.5)
BILIRUB SERPL-MCNC: 0.4 MG/DL (ref 0–1.2)
BUN SERPL-MCNC: 13 MG/DL (ref 6–20)
BUN/CREAT SERPL: 13.3 (ref 7–25)
CALCIUM SPEC-SCNC: 9.1 MG/DL (ref 8.6–10.5)
CHLORIDE SERPL-SCNC: 101 MMOL/L (ref 98–107)
CHOLEST SERPL-MCNC: 163 MG/DL (ref 0–200)
CO2 SERPL-SCNC: 30.5 MMOL/L (ref 22–29)
CREAT SERPL-MCNC: 0.98 MG/DL (ref 0.76–1.27)
DEPRECATED RDW RBC AUTO: 35.4 FL (ref 37–54)
EGFRCR SERPLBLD CKD-EPI 2021: 103.8 ML/MIN/1.73
EOSINOPHIL # BLD AUTO: 0.05 10*3/MM3 (ref 0–0.4)
EOSINOPHIL NFR BLD AUTO: 0.8 % (ref 0.3–6.2)
ERYTHROCYTE [DISTWIDTH] IN BLOOD BY AUTOMATED COUNT: 11.6 % (ref 12.3–15.4)
ERYTHROCYTE [SEDIMENTATION RATE] IN BLOOD: 10 MM/HR (ref 0–15)
FOLATE SERPL-MCNC: 19.3 NG/ML (ref 4.78–24.2)
GLOBULIN UR ELPH-MCNC: 2.9 GM/DL
GLUCOSE SERPL-MCNC: 87 MG/DL (ref 65–99)
HBV SURFACE AG SERPL QL IA: NORMAL
HCT VFR BLD AUTO: 41.4 % (ref 37.5–51)
HDLC SERPL-MCNC: 63 MG/DL (ref 40–60)
HGB BLD-MCNC: 13.4 G/DL (ref 13–17.7)
IGA1 MFR SER: 290 MG/DL (ref 70–400)
IGG1 SER-MCNC: 1448 MG/DL (ref 700–1600)
IGM SERPL-MCNC: 56 MG/DL (ref 40–230)
IMM GRANULOCYTES # BLD AUTO: 0.04 10*3/MM3 (ref 0–0.05)
IMM GRANULOCYTES NFR BLD AUTO: 0.6 % (ref 0–0.5)
LDLC SERPL CALC-MCNC: 79 MG/DL (ref 0–100)
LDLC/HDLC SERPL: 1.21 {RATIO}
LYMPHOCYTES # BLD AUTO: 2.39 10*3/MM3 (ref 0.7–3.1)
LYMPHOCYTES NFR BLD AUTO: 36.2 % (ref 19.6–45.3)
MAGNESIUM SERPL-MCNC: 2.2 MG/DL (ref 1.6–2.6)
MCH RBC QN AUTO: 26.9 PG (ref 26.6–33)
MCHC RBC AUTO-ENTMCNC: 32.4 G/DL (ref 31.5–35.7)
MCV RBC AUTO: 83 FL (ref 79–97)
MONOCYTES # BLD AUTO: 0.59 10*3/MM3 (ref 0.1–0.9)
MONOCYTES NFR BLD AUTO: 8.9 % (ref 5–12)
NEUTROPHILS NFR BLD AUTO: 3.51 10*3/MM3 (ref 1.7–7)
NEUTROPHILS NFR BLD AUTO: 53.2 % (ref 42.7–76)
NRBC BLD AUTO-RTO: 0 /100 WBC (ref 0–0.2)
PLATELET # BLD AUTO: 254 10*3/MM3 (ref 140–450)
PMV BLD AUTO: 9.9 FL (ref 6–12)
POTASSIUM SERPL-SCNC: 4 MMOL/L (ref 3.5–5.2)
PROT SERPL-MCNC: 7.7 G/DL (ref 6–8.5)
RBC # BLD AUTO: 4.99 10*6/MM3 (ref 4.14–5.8)
SODIUM SERPL-SCNC: 138 MMOL/L (ref 136–145)
T4 FREE SERPL-MCNC: 1.13 NG/DL (ref 0.93–1.7)
TRIGL SERPL-MCNC: 118 MG/DL (ref 0–150)
VIT B12 BLD-MCNC: 960 PG/ML (ref 211–946)
VLDLC SERPL-MCNC: 21 MG/DL (ref 5–40)
WBC NRBC COR # BLD: 6.6 10*3/MM3 (ref 3.4–10.8)

## 2022-11-23 PROCEDURE — 85652 RBC SED RATE AUTOMATED: CPT

## 2022-11-23 PROCEDURE — 87340 HEPATITIS B SURFACE AG IA: CPT

## 2022-11-23 PROCEDURE — 80053 COMPREHEN METABOLIC PANEL: CPT

## 2022-11-23 PROCEDURE — 82784 ASSAY IGA/IGD/IGG/IGM EACH: CPT

## 2022-11-23 PROCEDURE — 83735 ASSAY OF MAGNESIUM: CPT

## 2022-11-23 PROCEDURE — 84439 ASSAY OF FREE THYROXINE: CPT

## 2022-11-23 PROCEDURE — 80061 LIPID PANEL: CPT

## 2022-11-23 PROCEDURE — 82746 ASSAY OF FOLIC ACID SERUM: CPT

## 2022-11-23 PROCEDURE — 36415 COLL VENOUS BLD VENIPUNCTURE: CPT

## 2022-11-23 PROCEDURE — 85025 COMPLETE CBC W/AUTO DIFF WBC: CPT

## 2022-11-23 PROCEDURE — 86704 HEP B CORE ANTIBODY TOTAL: CPT

## 2022-11-23 PROCEDURE — 82607 VITAMIN B-12: CPT

## 2022-11-23 NOTE — PROGRESS NOTES
SPOKE WITH SHANICE GREEN (GRANDMOTHER) AND REMINDED HER THAT MR. RICHARDSON WILL HAVE TO HAVE HIS LAB WORK COMPLETED BEFORE DR. PLASCENCIA CAN SET-UP THE OCREVUS INFUSION. I LET HER KNOW THAT THE OCREVUS IS AUTHORIZED AND READY TO START AS SOON AS DR. PLASCENCIA GETS THE RESULTS OF THE BLOOD WORK. MS. GREEN SAID SHE WOULD LET HIM KNOW AND EXPRESSED UNDERSTANDING ABOUT THE IMPORTANCE OF HAVING THE LABS DONE ASAP.

## 2022-11-24 LAB — HBV CORE AB SERPL QL IA: NEGATIVE

## 2022-11-28 DIAGNOSIS — Q23.2 MS (CONGENITAL MITRAL STENOSIS): Primary | ICD-10-CM

## 2022-12-02 ENCOUNTER — DOCUMENTATION (OUTPATIENT)
Dept: NEUROLOGY | Facility: CLINIC | Age: 32
End: 2022-12-02

## 2022-12-02 DIAGNOSIS — G35 MS (MULTIPLE SCLEROSIS): Primary | ICD-10-CM

## 2022-12-02 LAB
CONV INDEX VALUE: 2.43
INTERPRETATION: ABNORMAL
INTERPRETATION: ABNORMAL
JCPYV AB SERPL QL IA: POSITIVE

## 2022-12-08 ENCOUNTER — HOSPITAL ENCOUNTER (OUTPATIENT)
Dept: MRI IMAGING | Facility: HOSPITAL | Age: 32
Discharge: HOME | End: 2022-12-08

## 2022-12-08 ENCOUNTER — DOCUMENTATION (OUTPATIENT)
Dept: NEUROLOGY | Facility: CLINIC | Age: 32
End: 2022-12-08

## 2022-12-08 DIAGNOSIS — Q23.2 MS (CONGENITAL MITRAL STENOSIS): ICD-10-CM

## 2022-12-08 LAB — CREAT BLDA-MCNC: 1.1 MG/DL (ref 0.6–1.3)

## 2022-12-08 PROCEDURE — 82565 ASSAY OF CREATININE: CPT

## 2022-12-08 PROCEDURE — 70553 MRI BRAIN STEM W/O & W/DYE: CPT

## 2022-12-08 PROCEDURE — 72156 MRI NECK SPINE W/O & W/DYE: CPT

## 2022-12-08 PROCEDURE — A9577 INJ MULTIHANCE: HCPCS | Performed by: PSYCHIATRY & NEUROLOGY

## 2022-12-08 PROCEDURE — 0 GADOBENATE DIMEGLUMINE 529 MG/ML SOLUTION: Performed by: PSYCHIATRY & NEUROLOGY

## 2022-12-08 RX ADMIN — GADOBENATE DIMEGLUMINE 15 ML: 529 INJECTION, SOLUTION INTRAVENOUS at 11:17

## 2022-12-08 NOTE — PROGRESS NOTES
I did contact patient's relative, Mariluz Angel in regards to texting the patient and asking if he could come to [facility] out patient lab and get his blood work done.  Ms. Angel states she will contact the patient.  He will need to come in today or tomorrow.

## 2022-12-09 ENCOUNTER — LAB (OUTPATIENT)
Dept: LAB | Facility: HOSPITAL | Age: 32
End: 2022-12-09

## 2022-12-09 ENCOUNTER — HOSPITAL ENCOUNTER (OUTPATIENT)
Dept: MRI IMAGING | Facility: HOSPITAL | Age: 32
Discharge: HOME | End: 2022-12-09
Admitting: PSYCHIATRY & NEUROLOGY

## 2022-12-09 DIAGNOSIS — G35 MS (MULTIPLE SCLEROSIS): ICD-10-CM

## 2022-12-09 DIAGNOSIS — R90.89 ABNORMAL BRAIN MRI: Primary | ICD-10-CM

## 2022-12-09 DIAGNOSIS — R90.89 ABNORMAL BRAIN MRI: ICD-10-CM

## 2022-12-09 LAB — HIV1+2 AB SER QL: NORMAL

## 2022-12-09 PROCEDURE — 70544 MR ANGIOGRAPHY HEAD W/O DYE: CPT

## 2022-12-09 PROCEDURE — 36415 COLL VENOUS BLD VENIPUNCTURE: CPT

## 2022-12-09 PROCEDURE — 86480 TB TEST CELL IMMUN MEASURE: CPT

## 2022-12-09 PROCEDURE — G0432 EIA HIV-1/HIV-2 SCREEN: HCPCS

## 2022-12-12 ENCOUNTER — DOCUMENTATION (OUTPATIENT)
Dept: NEUROLOGY | Facility: CLINIC | Age: 32
End: 2022-12-12

## 2022-12-12 NOTE — PROGRESS NOTES
I did contact [facility] [place], operated by [facility] and spoke with Yvonne in regards to a lab that was not drawn as ordered.  Yvonne states it looks as though the patient did come in and did have his blood drawn but the tech only theresa 1 tube when it required two tubes.  I have again attempted to contact the patient through his relative, Ms. Angel.  I was able to leave her a message and did explain what happened with the lab.  I have ask that she again ask the patient to come to our lab and get this lab done ASAP.  I did leave the relative my number if she has any questions or has any concerns.

## 2022-12-13 ENCOUNTER — LAB (OUTPATIENT)
Dept: LAB | Facility: HOSPITAL | Age: 32
End: 2022-12-13

## 2022-12-13 LAB
GAMMA INTERFERON BACKGROUND BLD IA-ACNC: 0.33 IU/ML
M TB IFN-G BLD-IMP: NEGATIVE
M TB IFN-G CD4+ BCKGRND COR BLD-ACNC: 0.47 IU/ML
M TB IFN-G CD4+CD8+ BCKGRND COR BLD-ACNC: 0.33 IU/ML
MITOGEN IGNF BLD-ACNC: >10 IU/ML
SERVICE CMNT-IMP: NORMAL

## 2022-12-13 PROCEDURE — 36415 COLL VENOUS BLD VENIPUNCTURE: CPT

## 2022-12-13 PROCEDURE — 86357 NK CELLS TOTAL COUNT: CPT

## 2022-12-13 PROCEDURE — 86355 B CELLS TOTAL COUNT: CPT

## 2022-12-14 LAB
CD19 CELLS # BLD: 46 /UL (ref 12–645)
CD19 CELLS NFR BLD: 2.3 % (ref 3.3–25.4)
CD3-CD16+CD56+ CELLS # BLD: 414 /UL (ref 24–406)
CD3-CD16+CD56+ CELLS NFR BLD: 20.7 % (ref 1.4–19.4)
LYMPHOCYTES # BLD AUTO: 2 X10E3/UL (ref 0.7–3.1)
LYMPHOCYTES NFR BLD AUTO: 29 %
WBC # BLD AUTO: 7 X10E3/UL (ref 3.4–10.8)

## 2022-12-16 ENCOUNTER — OFFICE VISIT (OUTPATIENT)
Dept: NEUROLOGY | Facility: CLINIC | Age: 32
End: 2022-12-16

## 2022-12-16 VITALS
SYSTOLIC BLOOD PRESSURE: 120 MMHG | HEIGHT: 70 IN | RESPIRATION RATE: 18 BRPM | HEART RATE: 70 BPM | WEIGHT: 157 LBS | DIASTOLIC BLOOD PRESSURE: 80 MMHG | BODY MASS INDEX: 22.48 KG/M2

## 2022-12-16 DIAGNOSIS — G35 MULTIPLE SCLEROSIS: Primary | ICD-10-CM

## 2022-12-16 DIAGNOSIS — H91.93 BILATERAL DEAFNESS: Primary | ICD-10-CM

## 2022-12-16 DIAGNOSIS — G35 MULTIPLE SCLEROSIS: ICD-10-CM

## 2022-12-16 PROCEDURE — 99215 OFFICE O/P EST HI 40 MIN: CPT | Performed by: PSYCHIATRY & NEUROLOGY

## 2022-12-16 RX ORDER — DIPHENHYDRAMINE HYDROCHLORIDE 50 MG/ML
25 INJECTION INTRAMUSCULAR; INTRAVENOUS ONCE
Status: CANCELLED | OUTPATIENT
Start: 2022-12-16

## 2022-12-16 RX ORDER — DIPHENHYDRAMINE HYDROCHLORIDE 50 MG/ML
50 INJECTION INTRAMUSCULAR; INTRAVENOUS AS NEEDED
Status: CANCELLED | OUTPATIENT
Start: 2022-12-16

## 2022-12-16 RX ORDER — FAMOTIDINE 10 MG/ML
20 INJECTION, SOLUTION INTRAVENOUS AS NEEDED
Status: CANCELLED | OUTPATIENT
Start: 2022-12-16

## 2022-12-16 RX ORDER — SODIUM CHLORIDE 9 MG/ML
75 INJECTION, SOLUTION INTRAVENOUS CONTINUOUS
Status: CANCELLED | OUTPATIENT
Start: 2022-12-16

## 2022-12-16 RX ORDER — ACETAMINOPHEN 325 MG/1
650 TABLET ORAL ONCE
Status: CANCELLED | OUTPATIENT
Start: 2022-12-16

## 2022-12-16 RX ORDER — CHOLECALCIFEROL (VITAMIN D3) 50 MCG
2000 TABLET ORAL DAILY
Qty: 30 TABLET | Refills: 5 | Status: SHIPPED | OUTPATIENT
Start: 2022-12-16 | End: 2023-03-28 | Stop reason: SDUPTHER

## 2022-12-16 RX ORDER — ACETAMINOPHEN 325 MG/1
650 TABLET ORAL EVERY 6 HOURS PRN
Status: CANCELLED | OUTPATIENT
Start: 2022-12-16

## 2022-12-16 RX ORDER — MEPERIDINE HYDROCHLORIDE 50 MG/ML
25 INJECTION INTRAMUSCULAR; INTRAVENOUS; SUBCUTANEOUS
Status: CANCELLED | OUTPATIENT
Start: 2022-12-16

## 2022-12-16 NOTE — PATIENT INSTRUCTIONS
Patient to get to the emergency room for any reaction to infusions.  Patient to get to emergency room for any exacerbation of MS.  Patient to have driving and safety precautions.  Patient to have caution climbing and using sharp cutting tools and work around hot fire/stove/grill/water.

## 2022-12-16 NOTE — PROGRESS NOTES
Subjective   Deangelo Hall, 1990, is a male who is being seen today for   Chief Complaint   Patient presents with   • Multiple Sclerosis       HISTORY OF PRESENT ILLNESS: Extended follow-up.  Patient seen for MS.  Patient is seen through an  [id number] as he is deaf.  Patient's MRI of the brain showed no new lesions and no enhancement.  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  Patient now admits to not getting several infusions.  Of probably not having infusions since 2020.  Patient CD19 cells some repletion of the B cells so we are giving him half dose Ocrevus (300 mg) in 2 infusions 14 days apart.  Potential side effects of the infusion are reviewed.  Patient had apparently COVID in November 2021 but had vaccinations.  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  Patient denies any recent headaches or MS exacerbations.  He does complain of some dryness of skin and hair loss and is to get with PCP about that.    REVIEW OF SYSTEMS:   GENERAL: Blood pressure today 120/80 left arm sitting and standing, pulse 70  PULMONARY: No acute respiratory difficulty  CVS: No acute chest pain or palpitation  GASTROINTESTINAL: No acute GI distress  GENITOURINARY: No acute  distress  GYN: Not applicable  MUSCULOSKELETAL: No acute musculoskeletal symptoms  HEENT: No acute vision changes  ENDOCRINE: Not diabetic  PSYCHIATRIC: No acute psychiatric symptoms  HEMATOLOGY: No anemia at this time, CHEM profile normal, immunoglobulins normal.  Hepatitis B normal.  HIV negative and tuberculosis negative  SKIN: As above  Family history reviewed and patient has maternal grandmother with dementia  Social history: Patient denies smoking.  Patient does use alcohol / marijuana      PHYSICAL EXAMINATION:    GENERAL: No acute distress  CRANIUM:  Normal cephalic/atraumatic  HEENT:       EYES: No acute fundic abnormalities.  Pupils equal round reactive to light.  EOMs intact without nystagmus and fields full to confrontation.       EARS: Tympanic membranes obscured by wax bilaterally       THROAT: No acute oropharynx abnormalities.       NECK: No bruits/no lymphadenopathy  CHEST: No acute cardiopulmonary abnormalities by auscultation  ABDOMEN: Nondistended  EXTREMITIES: Dorsalis pedis pulse symmetrical  NEURO: Patient alert and follows commands without difficulty  SPEECH: Speech unable to be evaluated because of deafness    CRANIAL NERVES: Motor/sensory about the face normal and symmetric    MOTOR STRENGTH: Motor strength upper and lower extremities normal  STATION AND GAIT: Gait normal/Romberg negative  CEREBELLAR: Finger-nose and heel-to-shin normal  SENSORY: Pin and vibration upper and lower extremities normal  REFLEXES: Reflexes present symmetric upper and lower EXTR without clonus or Babinski      ASSESSMENT AND PLAN: Patient with MS.  Patient to start his first Ocrevus infusion next week.  Patient to start vitamin D.  Safety precautions and driving precautions.  I spent 40 minutes with this patient with counseling exam and review of records.  Patient to get to emergency room immediately if any infusion reaction or MS exacerbation.  Patient have a  bring to the infusion and take home.  Patient's BMI in normal range.      Diagnoses and all orders for this visit:    1. Multiple sclerosis (HCC) (Primary)    Other orders  -     Cholecalciferol (Vitamin D) 50 MCG (2000 UT) tablet; Take 2,000 Units by mouth Daily.  Dispense: 30 tablet; Refill: 5        Dictated utilizing Dragon voice recognition software

## 2022-12-21 ENCOUNTER — LAB (OUTPATIENT)
Dept: LAB | Facility: HOSPITAL | Age: 32
End: 2022-12-21

## 2022-12-21 ENCOUNTER — INFUSION (OUTPATIENT)
Dept: ONCOLOGY | Facility: HOSPITAL | Age: 32
End: 2022-12-21

## 2022-12-21 VITALS
BODY MASS INDEX: 22.05 KG/M2 | HEIGHT: 70 IN | WEIGHT: 154 LBS | RESPIRATION RATE: 18 BRPM | TEMPERATURE: 97.8 F | SYSTOLIC BLOOD PRESSURE: 123 MMHG | HEART RATE: 61 BPM | OXYGEN SATURATION: 99 % | DIASTOLIC BLOOD PRESSURE: 71 MMHG

## 2022-12-21 DIAGNOSIS — H91.93 BILATERAL DEAFNESS: Primary | ICD-10-CM

## 2022-12-21 DIAGNOSIS — G35 MULTIPLE SCLEROSIS: ICD-10-CM

## 2022-12-21 DIAGNOSIS — H91.93 BILATERAL DEAFNESS: ICD-10-CM

## 2022-12-21 LAB
ALBUMIN SERPL-MCNC: 4.6 G/DL (ref 3.5–5.2)
ALBUMIN/GLOB SERPL: 1.6 G/DL
ALP SERPL-CCNC: 63 U/L (ref 39–117)
ALT SERPL W P-5'-P-CCNC: 25 U/L (ref 1–41)
ANION GAP SERPL CALCULATED.3IONS-SCNC: 6 MMOL/L (ref 5–15)
AST SERPL-CCNC: 27 U/L (ref 1–40)
BASOPHILS # BLD AUTO: 0.03 10*3/MM3 (ref 0–0.2)
BASOPHILS NFR BLD AUTO: 0.5 % (ref 0–1.5)
BILIRUB SERPL-MCNC: 0.7 MG/DL (ref 0–1.2)
BUN SERPL-MCNC: 9 MG/DL (ref 6–20)
BUN/CREAT SERPL: 9.8 (ref 7–25)
CALCIUM SPEC-SCNC: 9.1 MG/DL (ref 8.6–10.5)
CHLORIDE SERPL-SCNC: 102 MMOL/L (ref 98–107)
CO2 SERPL-SCNC: 29 MMOL/L (ref 22–29)
CREAT SERPL-MCNC: 0.92 MG/DL (ref 0.76–1.27)
DEPRECATED RDW RBC AUTO: 42.4 FL (ref 37–54)
EGFRCR SERPLBLD CKD-EPI 2021: 111.9 ML/MIN/1.73
EOSINOPHIL # BLD AUTO: 0.17 10*3/MM3 (ref 0–0.4)
EOSINOPHIL NFR BLD AUTO: 2.8 % (ref 0.3–6.2)
ERYTHROCYTE [DISTWIDTH] IN BLOOD BY AUTOMATED COUNT: 13.2 % (ref 12.3–15.4)
GLOBULIN UR ELPH-MCNC: 2.8 GM/DL
GLUCOSE SERPL-MCNC: 106 MG/DL (ref 65–99)
HCT VFR BLD AUTO: 46.6 % (ref 37.5–51)
HGB BLD-MCNC: 14.4 G/DL (ref 13–17.7)
IMM GRANULOCYTES # BLD AUTO: 0.01 10*3/MM3 (ref 0–0.05)
IMM GRANULOCYTES NFR BLD AUTO: 0.2 % (ref 0–0.5)
LYMPHOCYTES # BLD AUTO: 2.79 10*3/MM3 (ref 0.7–3.1)
LYMPHOCYTES NFR BLD AUTO: 46.6 % (ref 19.6–45.3)
MCH RBC QN AUTO: 27.1 PG (ref 26.6–33)
MCHC RBC AUTO-ENTMCNC: 30.9 G/DL (ref 31.5–35.7)
MCV RBC AUTO: 87.6 FL (ref 79–97)
MONOCYTES # BLD AUTO: 0.67 10*3/MM3 (ref 0.1–0.9)
MONOCYTES NFR BLD AUTO: 11.2 % (ref 5–12)
NEUTROPHILS NFR BLD AUTO: 2.32 10*3/MM3 (ref 1.7–7)
NEUTROPHILS NFR BLD AUTO: 38.7 % (ref 42.7–76)
NRBC BLD AUTO-RTO: 0 /100 WBC (ref 0–0.2)
PLATELET # BLD AUTO: 218 10*3/MM3 (ref 140–450)
PMV BLD AUTO: 9.2 FL (ref 6–12)
POTASSIUM SERPL-SCNC: 3.8 MMOL/L (ref 3.5–5.2)
PROT SERPL-MCNC: 7.4 G/DL (ref 6–8.5)
RBC # BLD AUTO: 5.32 10*6/MM3 (ref 4.14–5.8)
SODIUM SERPL-SCNC: 137 MMOL/L (ref 136–145)
WBC NRBC COR # BLD: 5.99 10*3/MM3 (ref 3.4–10.8)

## 2022-12-21 PROCEDURE — 25010000002 DIPHENHYDRAMINE PER 50 MG: Performed by: PSYCHIATRY & NEUROLOGY

## 2022-12-21 PROCEDURE — 80053 COMPREHEN METABOLIC PANEL: CPT

## 2022-12-21 PROCEDURE — 96375 TX/PRO/DX INJ NEW DRUG ADDON: CPT

## 2022-12-21 PROCEDURE — 36415 COLL VENOUS BLD VENIPUNCTURE: CPT

## 2022-12-21 PROCEDURE — 25010000002 OCRELIZUMAB 300 MG/10ML SOLUTION 300 MG VIAL: Performed by: PSYCHIATRY & NEUROLOGY

## 2022-12-21 PROCEDURE — 85025 COMPLETE CBC W/AUTO DIFF WBC: CPT

## 2022-12-21 PROCEDURE — 96413 CHEMO IV INFUSION 1 HR: CPT

## 2022-12-21 PROCEDURE — 96415 CHEMO IV INFUSION ADDL HR: CPT

## 2022-12-21 PROCEDURE — 25010000002 METHYLPREDNISOLONE PER 125 MG: Performed by: PSYCHIATRY & NEUROLOGY

## 2022-12-21 RX ORDER — METHYLPREDNISOLONE SODIUM SUCCINATE 125 MG/2ML
100 INJECTION, POWDER, LYOPHILIZED, FOR SOLUTION INTRAMUSCULAR; INTRAVENOUS ONCE
Status: COMPLETED | OUTPATIENT
Start: 2022-12-21 | End: 2022-12-21

## 2022-12-21 RX ORDER — ACETAMINOPHEN 325 MG/1
650 TABLET ORAL ONCE
Status: COMPLETED | OUTPATIENT
Start: 2022-12-21 | End: 2022-12-21

## 2022-12-21 RX ORDER — MEPERIDINE HYDROCHLORIDE 50 MG/ML
25 INJECTION INTRAMUSCULAR; INTRAVENOUS; SUBCUTANEOUS
Status: CANCELLED | OUTPATIENT
Start: 2023-01-04

## 2022-12-21 RX ORDER — METHYLPREDNISOLONE SODIUM SUCCINATE 125 MG/2ML
100 INJECTION, POWDER, LYOPHILIZED, FOR SOLUTION INTRAMUSCULAR; INTRAVENOUS ONCE
Status: CANCELLED | OUTPATIENT
Start: 2023-01-04

## 2022-12-21 RX ORDER — SODIUM CHLORIDE 9 MG/ML
75 INJECTION, SOLUTION INTRAVENOUS CONTINUOUS
Status: CANCELLED | OUTPATIENT
Start: 2023-01-04

## 2022-12-21 RX ORDER — DIPHENHYDRAMINE HYDROCHLORIDE 50 MG/ML
25 INJECTION INTRAMUSCULAR; INTRAVENOUS ONCE
Status: CANCELLED | OUTPATIENT
Start: 2023-01-04

## 2022-12-21 RX ORDER — ACETAMINOPHEN 325 MG/1
650 TABLET ORAL EVERY 6 HOURS PRN
Status: CANCELLED | OUTPATIENT
Start: 2023-01-04

## 2022-12-21 RX ORDER — SODIUM CHLORIDE 9 MG/ML
75 INJECTION, SOLUTION INTRAVENOUS CONTINUOUS
Status: DISCONTINUED | OUTPATIENT
Start: 2022-12-21 | End: 2022-12-21 | Stop reason: HOSPADM

## 2022-12-21 RX ORDER — DIPHENHYDRAMINE HYDROCHLORIDE 50 MG/ML
50 INJECTION INTRAMUSCULAR; INTRAVENOUS AS NEEDED
Status: CANCELLED | OUTPATIENT
Start: 2023-01-04

## 2022-12-21 RX ORDER — ACETAMINOPHEN 325 MG/1
650 TABLET ORAL ONCE
Status: CANCELLED | OUTPATIENT
Start: 2023-01-04

## 2022-12-21 RX ORDER — DIPHENHYDRAMINE HYDROCHLORIDE 50 MG/ML
25 INJECTION INTRAMUSCULAR; INTRAVENOUS ONCE
Status: COMPLETED | OUTPATIENT
Start: 2022-12-21 | End: 2022-12-21

## 2022-12-21 RX ORDER — FAMOTIDINE 10 MG/ML
20 INJECTION, SOLUTION INTRAVENOUS AS NEEDED
Status: CANCELLED | OUTPATIENT
Start: 2023-01-04

## 2022-12-21 RX ADMIN — OCRELIZUMAB 300 MG: 300 INJECTION INTRAVENOUS at 10:05

## 2022-12-21 RX ADMIN — DIPHENHYDRAMINE HYDROCHLORIDE 25 MG: 50 INJECTION, SOLUTION INTRAMUSCULAR; INTRAVENOUS at 09:45

## 2022-12-21 RX ADMIN — METHYLPREDNISOLONE SODIUM SUCCINATE 100 MG: 125 INJECTION, POWDER, FOR SOLUTION INTRAMUSCULAR; INTRAVENOUS at 09:50

## 2022-12-21 RX ADMIN — ACETAMINOPHEN 650 MG: 325 TABLET, FILM COATED ORAL at 09:45

## 2022-12-21 RX ADMIN — SODIUM CHLORIDE 75 ML/HR: 9 INJECTION, SOLUTION INTRAVENOUS at 09:45

## 2023-01-04 ENCOUNTER — DOCUMENTATION (OUTPATIENT)
Dept: NEUROLOGY | Facility: CLINIC | Age: 33
End: 2023-01-04
Payer: MEDICAID

## 2023-01-04 NOTE — PROGRESS NOTES
I received notice that patient did miss his Ocrevous infusion this am.  I have notified the patient's family member to contact the patient about his missed appointment.  After family member communicated with patient he had the dates of his infusion confused.  He is rescheduled for the infusion on January 6th at 8 am.  Family member has been notified and she will contact the patient with the new date and time.

## 2023-01-06 ENCOUNTER — APPOINTMENT (OUTPATIENT)
Dept: ONCOLOGY | Facility: HOSPITAL | Age: 33
End: 2023-01-06
Payer: MEDICAID

## 2023-01-06 ENCOUNTER — APPOINTMENT (OUTPATIENT)
Dept: LAB | Facility: HOSPITAL | Age: 33
End: 2023-01-06
Payer: MEDICAID

## 2023-01-09 ENCOUNTER — INFUSION (OUTPATIENT)
Dept: ONCOLOGY | Facility: HOSPITAL | Age: 33
End: 2023-01-09
Payer: MEDICAID

## 2023-01-09 ENCOUNTER — LAB (OUTPATIENT)
Dept: LAB | Facility: HOSPITAL | Age: 33
End: 2023-01-09
Payer: MEDICAID

## 2023-01-09 VITALS
SYSTOLIC BLOOD PRESSURE: 142 MMHG | HEART RATE: 63 BPM | HEIGHT: 70 IN | TEMPERATURE: 98 F | RESPIRATION RATE: 18 BRPM | DIASTOLIC BLOOD PRESSURE: 80 MMHG | OXYGEN SATURATION: 100 % | BODY MASS INDEX: 22.48 KG/M2 | WEIGHT: 157 LBS

## 2023-01-09 DIAGNOSIS — H91.93 BILATERAL DEAFNESS: Primary | ICD-10-CM

## 2023-01-09 DIAGNOSIS — G35 MULTIPLE SCLEROSIS: ICD-10-CM

## 2023-01-09 DIAGNOSIS — H91.93 BILATERAL DEAFNESS: ICD-10-CM

## 2023-01-09 LAB
ALBUMIN SERPL-MCNC: 4.6 G/DL (ref 3.5–5.2)
ALBUMIN/GLOB SERPL: 1.7 G/DL
ALP SERPL-CCNC: 54 U/L (ref 39–117)
ALT SERPL W P-5'-P-CCNC: 25 U/L (ref 1–41)
ANION GAP SERPL CALCULATED.3IONS-SCNC: 10 MMOL/L (ref 5–15)
AST SERPL-CCNC: 23 U/L (ref 1–40)
BASOPHILS # BLD AUTO: 0.03 10*3/MM3 (ref 0–0.2)
BASOPHILS NFR BLD AUTO: 0.6 % (ref 0–1.5)
BILIRUB SERPL-MCNC: 0.9 MG/DL (ref 0–1.2)
BUN SERPL-MCNC: 9 MG/DL (ref 6–20)
BUN/CREAT SERPL: 9.5 (ref 7–25)
CALCIUM SPEC-SCNC: 9 MG/DL (ref 8.6–10.5)
CHLORIDE SERPL-SCNC: 102 MMOL/L (ref 98–107)
CO2 SERPL-SCNC: 30 MMOL/L (ref 22–29)
CREAT SERPL-MCNC: 0.95 MG/DL (ref 0.76–1.27)
DEPRECATED RDW RBC AUTO: 40.2 FL (ref 37–54)
EGFRCR SERPLBLD CKD-EPI 2021: 107.7 ML/MIN/1.73
EOSINOPHIL # BLD AUTO: 0.12 10*3/MM3 (ref 0–0.4)
EOSINOPHIL NFR BLD AUTO: 2.4 % (ref 0.3–6.2)
ERYTHROCYTE [DISTWIDTH] IN BLOOD BY AUTOMATED COUNT: 12.9 % (ref 12.3–15.4)
GLOBULIN UR ELPH-MCNC: 2.7 GM/DL
GLUCOSE SERPL-MCNC: 126 MG/DL (ref 65–99)
HCT VFR BLD AUTO: 46.3 % (ref 37.5–51)
HGB BLD-MCNC: 14.6 G/DL (ref 13–17.7)
IMM GRANULOCYTES # BLD AUTO: 0.02 10*3/MM3 (ref 0–0.05)
IMM GRANULOCYTES NFR BLD AUTO: 0.4 % (ref 0–0.5)
LYMPHOCYTES # BLD AUTO: 2.21 10*3/MM3 (ref 0.7–3.1)
LYMPHOCYTES NFR BLD AUTO: 43.8 % (ref 19.6–45.3)
MCH RBC QN AUTO: 27.1 PG (ref 26.6–33)
MCHC RBC AUTO-ENTMCNC: 31.5 G/DL (ref 31.5–35.7)
MCV RBC AUTO: 86.1 FL (ref 79–97)
MONOCYTES # BLD AUTO: 0.55 10*3/MM3 (ref 0.1–0.9)
MONOCYTES NFR BLD AUTO: 10.9 % (ref 5–12)
NEUTROPHILS NFR BLD AUTO: 2.12 10*3/MM3 (ref 1.7–7)
NEUTROPHILS NFR BLD AUTO: 41.9 % (ref 42.7–76)
NRBC BLD AUTO-RTO: 0 /100 WBC (ref 0–0.2)
PLATELET # BLD AUTO: 230 10*3/MM3 (ref 140–450)
PMV BLD AUTO: 9.4 FL (ref 6–12)
POTASSIUM SERPL-SCNC: 3.8 MMOL/L (ref 3.5–5.2)
PROT SERPL-MCNC: 7.3 G/DL (ref 6–8.5)
RBC # BLD AUTO: 5.38 10*6/MM3 (ref 4.14–5.8)
SODIUM SERPL-SCNC: 142 MMOL/L (ref 136–145)
WBC NRBC COR # BLD: 5.05 10*3/MM3 (ref 3.4–10.8)

## 2023-01-09 PROCEDURE — 36415 COLL VENOUS BLD VENIPUNCTURE: CPT

## 2023-01-09 PROCEDURE — 96413 CHEMO IV INFUSION 1 HR: CPT

## 2023-01-09 PROCEDURE — 96365 THER/PROPH/DIAG IV INF INIT: CPT

## 2023-01-09 PROCEDURE — 80053 COMPREHEN METABOLIC PANEL: CPT

## 2023-01-09 PROCEDURE — 96366 THER/PROPH/DIAG IV INF ADDON: CPT

## 2023-01-09 PROCEDURE — 25010000002 OCRELIZUMAB 300 MG/10ML SOLUTION 300 MG VIAL: Performed by: PSYCHIATRY & NEUROLOGY

## 2023-01-09 PROCEDURE — 25010000002 METHYLPREDNISOLONE PER 125 MG: Performed by: PSYCHIATRY & NEUROLOGY

## 2023-01-09 PROCEDURE — 85025 COMPLETE CBC W/AUTO DIFF WBC: CPT

## 2023-01-09 PROCEDURE — 96375 TX/PRO/DX INJ NEW DRUG ADDON: CPT

## 2023-01-09 PROCEDURE — 96415 CHEMO IV INFUSION ADDL HR: CPT

## 2023-01-09 PROCEDURE — 25010000002 DIPHENHYDRAMINE PER 50 MG: Performed by: PSYCHIATRY & NEUROLOGY

## 2023-01-09 RX ORDER — FAMOTIDINE 10 MG/ML
20 INJECTION, SOLUTION INTRAVENOUS AS NEEDED
OUTPATIENT
Start: 2023-01-18

## 2023-01-09 RX ORDER — ACETAMINOPHEN 325 MG/1
650 TABLET ORAL EVERY 6 HOURS PRN
OUTPATIENT
Start: 2023-01-18

## 2023-01-09 RX ORDER — DIPHENHYDRAMINE HYDROCHLORIDE 50 MG/ML
50 INJECTION INTRAMUSCULAR; INTRAVENOUS AS NEEDED
OUTPATIENT
Start: 2023-01-18

## 2023-01-09 RX ORDER — ACETAMINOPHEN 325 MG/1
650 TABLET ORAL ONCE
Status: COMPLETED | OUTPATIENT
Start: 2023-01-09 | End: 2023-01-09

## 2023-01-09 RX ORDER — DIPHENHYDRAMINE HYDROCHLORIDE 50 MG/ML
25 INJECTION INTRAMUSCULAR; INTRAVENOUS ONCE
Status: CANCELLED | OUTPATIENT
Start: 2023-01-18

## 2023-01-09 RX ORDER — SODIUM CHLORIDE 9 MG/ML
75 INJECTION, SOLUTION INTRAVENOUS CONTINUOUS
Status: CANCELLED | OUTPATIENT
Start: 2023-01-18

## 2023-01-09 RX ORDER — SODIUM CHLORIDE 9 MG/ML
75 INJECTION, SOLUTION INTRAVENOUS CONTINUOUS
Status: DISCONTINUED | OUTPATIENT
Start: 2023-01-09 | End: 2023-01-09 | Stop reason: HOSPADM

## 2023-01-09 RX ORDER — MEPERIDINE HYDROCHLORIDE 50 MG/ML
25 INJECTION INTRAMUSCULAR; INTRAVENOUS; SUBCUTANEOUS
OUTPATIENT
Start: 2023-01-18

## 2023-01-09 RX ORDER — ACETAMINOPHEN 325 MG/1
650 TABLET ORAL ONCE
Status: CANCELLED | OUTPATIENT
Start: 2023-01-18

## 2023-01-09 RX ORDER — METHYLPREDNISOLONE SODIUM SUCCINATE 125 MG/2ML
100 INJECTION, POWDER, LYOPHILIZED, FOR SOLUTION INTRAMUSCULAR; INTRAVENOUS ONCE
Status: COMPLETED | OUTPATIENT
Start: 2023-01-09 | End: 2023-01-09

## 2023-01-09 RX ORDER — METHYLPREDNISOLONE SODIUM SUCCINATE 125 MG/2ML
100 INJECTION, POWDER, LYOPHILIZED, FOR SOLUTION INTRAMUSCULAR; INTRAVENOUS ONCE
Status: CANCELLED | OUTPATIENT
Start: 2023-01-18

## 2023-01-09 RX ORDER — DIPHENHYDRAMINE HYDROCHLORIDE 50 MG/ML
25 INJECTION INTRAMUSCULAR; INTRAVENOUS ONCE
Status: COMPLETED | OUTPATIENT
Start: 2023-01-09 | End: 2023-01-09

## 2023-01-09 RX ADMIN — METHYLPREDNISOLONE SODIUM SUCCINATE 100 MG: 125 INJECTION, POWDER, FOR SOLUTION INTRAMUSCULAR; INTRAVENOUS at 09:20

## 2023-01-09 RX ADMIN — DIPHENHYDRAMINE HYDROCHLORIDE 25 MG: 50 INJECTION, SOLUTION INTRAMUSCULAR; INTRAVENOUS at 09:22

## 2023-01-09 RX ADMIN — OCRELIZUMAB 300 MG: 300 INJECTION INTRAVENOUS at 09:35

## 2023-01-09 RX ADMIN — ACETAMINOPHEN 650 MG: 325 TABLET, FILM COATED ORAL at 09:19

## 2023-01-09 RX ADMIN — SODIUM CHLORIDE 75 ML/HR: 9 INJECTION, SOLUTION INTRAVENOUS at 09:19

## 2023-01-10 ENCOUNTER — DOCUMENTATION (OUTPATIENT)
Dept: NEUROLOGY | Facility: CLINIC | Age: 33
End: 2023-01-10
Payer: MEDICAID

## 2023-03-28 ENCOUNTER — LAB (OUTPATIENT)
Dept: LAB | Facility: HOSPITAL | Age: 33
End: 2023-03-28
Payer: MEDICAID

## 2023-03-28 ENCOUNTER — OFFICE VISIT (OUTPATIENT)
Dept: NEUROLOGY | Facility: CLINIC | Age: 33
End: 2023-03-28
Payer: MEDICAID

## 2023-03-28 ENCOUNTER — TELEPHONE (OUTPATIENT)
Dept: NEUROLOGY | Facility: CLINIC | Age: 33
End: 2023-03-28
Payer: MEDICAID

## 2023-03-28 VITALS
SYSTOLIC BLOOD PRESSURE: 124 MMHG | DIASTOLIC BLOOD PRESSURE: 72 MMHG | BODY MASS INDEX: 22.9 KG/M2 | HEART RATE: 64 BPM | WEIGHT: 160 LBS | HEIGHT: 70 IN | RESPIRATION RATE: 18 BRPM

## 2023-03-28 DIAGNOSIS — R21 RASH: ICD-10-CM

## 2023-03-28 DIAGNOSIS — G35 MS (MULTIPLE SCLEROSIS): Primary | ICD-10-CM

## 2023-03-28 DIAGNOSIS — M47.812 CERVICAL SPONDYLOSIS: ICD-10-CM

## 2023-03-28 DIAGNOSIS — M54.2 NECK PAIN: ICD-10-CM

## 2023-03-28 LAB
25(OH)D3 SERPL-MCNC: 17.2 NG/ML (ref 30–100)
ALBUMIN SERPL-MCNC: 4.9 G/DL (ref 3.5–5.2)
ALBUMIN/GLOB SERPL: 1.6 G/DL
ALP SERPL-CCNC: 50 U/L (ref 39–117)
ALT SERPL W P-5'-P-CCNC: 15 U/L (ref 1–41)
ANION GAP SERPL CALCULATED.3IONS-SCNC: 10 MMOL/L (ref 5–15)
AST SERPL-CCNC: 24 U/L (ref 1–40)
BASOPHILS # BLD AUTO: 0.03 10*3/MM3 (ref 0–0.2)
BASOPHILS NFR BLD AUTO: 0.5 % (ref 0–1.5)
BILIRUB SERPL-MCNC: 0.8 MG/DL (ref 0–1.2)
BUN SERPL-MCNC: 11 MG/DL (ref 6–20)
BUN/CREAT SERPL: 10 (ref 7–25)
CALCIUM SPEC-SCNC: 10 MG/DL (ref 8.6–10.5)
CHLORIDE SERPL-SCNC: 100 MMOL/L (ref 98–107)
CO2 SERPL-SCNC: 27 MMOL/L (ref 22–29)
CREAT SERPL-MCNC: 1.1 MG/DL (ref 0.76–1.27)
DEPRECATED RDW RBC AUTO: 36.8 FL (ref 37–54)
EGFRCR SERPLBLD CKD-EPI 2021: 90.3 ML/MIN/1.73
EOSINOPHIL # BLD AUTO: 0.06 10*3/MM3 (ref 0–0.4)
EOSINOPHIL NFR BLD AUTO: 0.9 % (ref 0.3–6.2)
ERYTHROCYTE [DISTWIDTH] IN BLOOD BY AUTOMATED COUNT: 12 % (ref 12.3–15.4)
GLOBULIN UR ELPH-MCNC: 3 GM/DL
GLUCOSE SERPL-MCNC: 55 MG/DL (ref 65–99)
HCT VFR BLD AUTO: 43.4 % (ref 37.5–51)
HGB BLD-MCNC: 13.9 G/DL (ref 13–17.7)
IMM GRANULOCYTES # BLD AUTO: 0.03 10*3/MM3 (ref 0–0.05)
IMM GRANULOCYTES NFR BLD AUTO: 0.5 % (ref 0–0.5)
LYMPHOCYTES # BLD AUTO: 1.93 10*3/MM3 (ref 0.7–3.1)
LYMPHOCYTES NFR BLD AUTO: 30.1 % (ref 19.6–45.3)
MCH RBC QN AUTO: 27.3 PG (ref 26.6–33)
MCHC RBC AUTO-ENTMCNC: 32 G/DL (ref 31.5–35.7)
MCV RBC AUTO: 85.1 FL (ref 79–97)
MONOCYTES # BLD AUTO: 0.62 10*3/MM3 (ref 0.1–0.9)
MONOCYTES NFR BLD AUTO: 9.7 % (ref 5–12)
NEUTROPHILS NFR BLD AUTO: 3.74 10*3/MM3 (ref 1.7–7)
NEUTROPHILS NFR BLD AUTO: 58.3 % (ref 42.7–76)
NRBC BLD AUTO-RTO: 0 /100 WBC (ref 0–0.2)
PLATELET # BLD AUTO: 251 10*3/MM3 (ref 140–450)
PMV BLD AUTO: 10.5 FL (ref 6–12)
POTASSIUM SERPL-SCNC: 4.1 MMOL/L (ref 3.5–5.2)
PROT SERPL-MCNC: 7.9 G/DL (ref 6–8.5)
RBC # BLD AUTO: 5.1 10*6/MM3 (ref 4.14–5.8)
SODIUM SERPL-SCNC: 137 MMOL/L (ref 136–145)
WBC NRBC COR # BLD: 6.41 10*3/MM3 (ref 3.4–10.8)

## 2023-03-28 PROCEDURE — 1160F RVW MEDS BY RX/DR IN RCRD: CPT | Performed by: PSYCHIATRY & NEUROLOGY

## 2023-03-28 PROCEDURE — 85025 COMPLETE CBC W/AUTO DIFF WBC: CPT | Performed by: PSYCHIATRY & NEUROLOGY

## 2023-03-28 PROCEDURE — 1159F MED LIST DOCD IN RCRD: CPT | Performed by: PSYCHIATRY & NEUROLOGY

## 2023-03-28 PROCEDURE — 82306 VITAMIN D 25 HYDROXY: CPT | Performed by: PSYCHIATRY & NEUROLOGY

## 2023-03-28 PROCEDURE — 99214 OFFICE O/P EST MOD 30 MIN: CPT | Performed by: PSYCHIATRY & NEUROLOGY

## 2023-03-28 PROCEDURE — 36415 COLL VENOUS BLD VENIPUNCTURE: CPT | Performed by: PSYCHIATRY & NEUROLOGY

## 2023-03-28 PROCEDURE — 80053 COMPREHEN METABOLIC PANEL: CPT | Performed by: PSYCHIATRY & NEUROLOGY

## 2023-03-28 RX ORDER — CHOLECALCIFEROL (VITAMIN D3) 50 MCG
2000 TABLET ORAL DAILY
Qty: 30 TABLET | Refills: 5 | Status: SHIPPED | OUTPATIENT
Start: 2023-03-28 | End: 2023-03-29 | Stop reason: SDUPTHER

## 2023-03-28 NOTE — TELEPHONE ENCOUNTER
I SPOKE WITH DARNELL ANJU (AUNT) TO LET HER KNOW THAT DR. PLASCENCIA HAS REFERRED MIKAEL TO DERMATOLOGY; HOWEVER, THE ONLY DERMATOLOGY OFFICE THAT ACCEPTS MEDICAID IN LOCATED IN [place]. MS. RENE STATED THAT MIKAEL WOULD NOT HAVE A WAY TO GET THERE. I SAID I UNDERSTOOD AND WOULD LET DR. PLASCENCIA KNOW. ALSO, MIKAEL WILL NEED TO GET WITH HIS PCP IN REGARDS TO THE RASH. MS. RENE VOICED UNDERSTANDING AND AGREEMENT.

## 2023-03-28 NOTE — PROGRESS NOTES
Left arteriovenous (radiocephalic) fistula creation Subjective   Deangelo Hall, 1990, is a male who is being seen today for   Chief Complaint   Patient presents with   • Multiple Sclerosis       HISTORY OF PRESENT ILLNESS: Patient is deaf and exam and history taken through  110564 the last several months since his last infusion he has had an itchy rash with pain muscle on the shoulder blade patient also finds himself having pain when he looks to the left like his neck locks.  He has occasional shooting sensation from the left neck down to the arm.  The MRI of the neck showed spondylosis at multiple levels with some neuroforaminal encroachment.  Patient is to be reviewed by neurosurgery for this.  Patient is to see dermatology about the rash.  Patient has not had any MS exacerbations since last seen in did not have any other side effects from the infusions.  Patient is to get appropriate blood work and a CBC/CMP and vitamin D level today patient is to continue the vitamin D 2000 units daily.  Patient's next infusion is July 2023.  EDSS score is 0    REVIEW OF SYSTEMS:   GENERAL: Blood pressure today is 124/72 left arm seated / same standing with pulse of 64  PULMONARY: No acute respiratory difficulty  CVS: No acute chest pain or palpitation  GASTROINTESTINAL: No acute GI distress  GENITOURINARY: No acute  distress  GYN: Not applicable  MUSCULOSKELETAL: As above  HEENT: No acute vision or hearing change  ENDOCRINE: Not diabetic  PSYCHIATRIC: No acute psychiatric symptoms  HEMATOLOGY: Blood work ordered  SKIN: No acute skin changes other than as above.  The rash few white bumps on his right posterior shoulder blade and measure about 2 cm length.  There is 1 white bump separately  Family history reviewed and otherwise noncontributory except for maternal grandmother with dementia  Patient denies smoking  but does use alcohol and marijuana.      PHYSICAL EXAMINATION:    GENERAL: No acute distress  CRANIUM: Patient has some cervical paraspinal tenderness  palpation ana normal cephalic/atraumatic.  HEENT:       EYES: EOMs intact without nystagmus and fields full to confrontation.  Pupils equal round reactive to light.  No acute fundic abnormalities.       EARS: Tympanic membranes obscured by wax bilaterally / does not hear tuning fork       THROAT: No acute oropharynx abnormalities and no swallowing difficulties by history       NECK: No bruits/no lymphadenopathy  CHEST: No acute cardiopulmonary abnormality by auscultation  ABDOMEN: Nondistended  EXTREMITIES: Dorsalis pedis pulses symmetrical  NEURO: Patient alert and follows commands without difficulty through the   SPEECH: No spontaneous speech    CRANIAL NERVES: Motor/sensory about the face normal symmetric    MOTOR STRENGTH: Finger-nose and heel shin normal  STATION AND GAIT: Gait normal/Romberg negative  CEREBELLAR: Finger-nose and heel-to-shin normal  SENSORY: Pin and vibration upper and lower extremities normal  REFLEXES: Decreased reflexes throughout upper and lower EXTR without clonus or Babinski      ASSESSMENT AND PLAN: Patient with multiple sclerosis and getting referrals as above.  Patient's  MS is stable so far on Ocrevus and is to get appropriate blood work today.  Patient to get to emergency room medially if exacerbation occurs.  Spent 35 minutes with this patient with counseling exam and review of records.  BMI in normal range for age      Diagnoses and all orders for this visit:    1. MS (multiple sclerosis) ([facility]) (Primary)  -     CBC & Differential  -     Comprehensive Metabolic Panel  -     Vitamin D 25 Hydroxy    2. Neck pain  -     Ambulatory Referral to Neurosurgery    3. Cervical spondylosis  -     Ambulatory Referral to Neurosurgery    4. Rash  -     Ambulatory Referral to Dermatology    Other orders  -     Cholecalciferol (Vitamin D) 50 MCG (2000 UT) tablet; Take 2,000 Units by mouth Daily.  Dispense: 30 tablet; Refill: 5        Dictated utilizing Dragon voice recognition  software

## 2023-03-28 NOTE — PATIENT INSTRUCTIONS
Patient to have driving and safety precautions as discussed.  Patient to get to emergency room immediately if MS exacerbation occurs.

## 2023-03-29 ENCOUNTER — DOCUMENTATION (OUTPATIENT)
Dept: NEUROLOGY | Facility: CLINIC | Age: 33
End: 2023-03-29
Payer: MEDICAID

## 2023-03-29 RX ORDER — CHOLECALCIFEROL (VITAMIN D3) 50 MCG
TABLET ORAL
Qty: 60 TABLET | Refills: 5 | Status: SHIPPED | OUTPATIENT
Start: 2023-03-29

## 2023-03-29 NOTE — PROGRESS NOTES
Dr Jones did review patient's labs.  He does have a low Vit. D and blood sugar level.  Dr. Jones has given orders to increase the Vit D 2000iu to 2 tabs daily if the patient is taking it correctly.  He also had a low blood sugar of 55.  He is to contact his PCP to discuss the low blood sugar.  I did contact his aunt, Ms. Angel and she will relay the message to the patient as he is hearing impaired.

## 2023-04-10 ENCOUNTER — OFFICE VISIT (OUTPATIENT)
Dept: FAMILY MEDICINE CLINIC | Facility: CLINIC | Age: 33
End: 2023-04-10
Payer: MEDICAID

## 2023-04-10 VITALS
SYSTOLIC BLOOD PRESSURE: 130 MMHG | TEMPERATURE: 98.7 F | DIASTOLIC BLOOD PRESSURE: 88 MMHG | OXYGEN SATURATION: 100 % | WEIGHT: 162 LBS | HEART RATE: 72 BPM | HEIGHT: 70 IN | BODY MASS INDEX: 23.19 KG/M2

## 2023-04-10 DIAGNOSIS — M54.2 NECK PAIN: ICD-10-CM

## 2023-04-10 DIAGNOSIS — E55.9 VITAMIN D DEFICIENCY: Primary | ICD-10-CM

## 2023-04-10 DIAGNOSIS — R21 RASH: ICD-10-CM

## 2023-04-10 DIAGNOSIS — E16.2 HYPOGLYCEMIA: ICD-10-CM

## 2023-04-10 DIAGNOSIS — F41.1 GAD (GENERALIZED ANXIETY DISORDER): ICD-10-CM

## 2023-04-10 PROCEDURE — 1159F MED LIST DOCD IN RCRD: CPT | Performed by: FAMILY MEDICINE

## 2023-04-10 PROCEDURE — 99214 OFFICE O/P EST MOD 30 MIN: CPT | Performed by: FAMILY MEDICINE

## 2023-04-10 PROCEDURE — 1160F RVW MEDS BY RX/DR IN RCRD: CPT | Performed by: FAMILY MEDICINE

## 2023-04-10 RX ORDER — ERGOCALCIFEROL 1.25 MG/1
50000 CAPSULE ORAL WEEKLY
Qty: 12 CAPSULE | Refills: 3 | Status: SHIPPED | OUTPATIENT
Start: 2023-04-10

## 2023-04-10 RX ORDER — FLUOXETINE HYDROCHLORIDE 20 MG/1
20 CAPSULE ORAL DAILY
Qty: 30 CAPSULE | Refills: 2 | Status: SHIPPED | OUTPATIENT
Start: 2023-04-10

## 2023-04-29 NOTE — PROGRESS NOTES
"Chief Complaint  Alopecia (Started after infusion) and Rash (Itching on neck and back )    Subjective        Deangelo Hall presents to [facility]  History of Present Illness  Reports hair loss after starting new infusion for MS  Also has itching rash on his neck and back  Needs vitamin D refill  Has been having some neck pain worse with lifting  ROS pos for anxiety and low blood sugar usually when he is working without scheduled meals    Objective   Vital Signs:  /88 (BP Location: Left arm, Patient Position: Sitting, Cuff Size: Adult)   Pulse 72   Temp 98.7 °F (37.1 °C) (Temporal)   Ht 177.8 cm (70\")   Wt 73.5 kg (162 lb)   SpO2 100%   BMI 23.24 kg/m²   Estimated body mass index is 23.24 kg/m² as calculated from the following:    Height as of this encounter: 177.8 cm (70\").    Weight as of this encounter: 73.5 kg (162 lb).       BMI is within normal parameters. No other follow-up for BMI required.      Physical Exam  Vitals and nursing note reviewed.   Constitutional:       General: He is not in acute distress.     Appearance: He is not diaphoretic.   HENT:      Head: Normocephalic and atraumatic.      Nose: Nose normal.   Eyes:      General: No scleral icterus.        Right eye: No discharge.         Left eye: No discharge.      Conjunctiva/sclera: Conjunctivae normal.   Neck:      Trachea: No tracheal deviation.   Pulmonary:      Effort: Pulmonary effort is normal.   Musculoskeletal:      Comments: Hypertonic trapezius b/l   Skin:     General: Skin is warm and dry.      Coloration: Skin is not pale.      Comments: Papular rash on neck   Neurological:      Mental Status: He is alert and oriented to person, place, and time.   Psychiatric:         Behavior: Behavior normal.         Thought Content: Thought content normal.         Judgment: Judgment normal.        Result Review :                   Assessment and Plan   Diagnoses and all orders for this visit:    1. " Vitamin D deficiency (Primary)  -     vitamin D (ERGOCALCIFEROL) 1.25 MG (71981 UT) capsule capsule; Take 1 capsule by mouth 1 (One) Time Per Week.  Dispense: 12 capsule; Refill: 3    2. Rash  -     Ambulatory Referral to Dermatology    3. Hypoglycemia    4. Neck pain    5. JG (generalized anxiety disorder)  -     FLUoxetine (PROzac) 20 MG capsule; Take 1 capsule by mouth Daily.  Dispense: 30 capsule; Refill: 2    prozac above  Derm referral  Encourage neck stretching  F/u 6 weeks

## 2023-05-01 NOTE — PROGRESS NOTES
Chief complaint: No chief complaint on file.      Subjective     HPI: This is a 32-year-old male gentleman who was referred to us by Dr. Joe Jones    Review of Systems     Past Medical History:   Diagnosis Date   • Anxiety    • Deaf    • Hearing impaired    • THA virus antibody positive    • Multiple sclerosis    • Seizure 04/2019     No past surgical history on file.  Family History   Problem Relation Age of Onset   • Dementia Maternal Grandmother    • Cancer Paternal Grandmother    • Hypertension Paternal Grandmother    • Heart disease Paternal Grandfather    • Heart disease Paternal Uncle      Social History     Tobacco Use   • Smoking status: Former     Types: Cigarettes   • Smokeless tobacco: Never   Vaping Use   • Vaping Use: Former   Substance Use Topics   • Alcohol use: Yes     Comment: socially   • Drug use: Yes     Types: Marijuana     (Not in a hospital admission)    Allergies:  Shellfish-derived products, Aldomet [methyldopa], Augmentin [amoxicillin-pot clavulanate], and Hydrocodone    Objective      Vital Signs  There were no vitals taken for this visit.    Physical Exam    Neurologic Exam    Imaging review: MRI of the cervical spine that was done on December 8, 2022 here at [facility] does show concern for cervical scoliosis.  There is also bilateral foraminal narrowing at C4-5  and C3-4.  No abnormal enhancement is noted.        Assessment/Plan: ***  For first line conservative care of {ANATOMY; SPINE REGION:49270} pain, I would like to send Mr. Hall for a dedicated course of physician directed physical therapy consisting of 2-3 times a week for 4-6 weeks.    Return for reassessment with me after 6-8 weeks after physical therapy.         I advised the patient to call and return sooner for new or worsening complaints of weakness, paresthesias, gait disturbances, or any additional concerns.  Treatment options discussed in detail with Deangelo and the patient voiced understanding.  Mr. Hall  agrees with this plan of care.     Patient is a {jonsmokin}  The patient's There is no height or weight on file to calculate BMI.. BMI is {BMI range:74144}    There are no diagnoses linked to this encounter.      I discussed the patients findings and my recommendations with patient    Marcelo YUE Richard, APRN  23  14:55 CDT

## 2023-05-02 ENCOUNTER — OFFICE VISIT (OUTPATIENT)
Dept: NEUROSURGERY | Facility: CLINIC | Age: 33
End: 2023-05-02
Payer: MEDICAID

## 2023-05-02 VITALS — HEIGHT: 70 IN | BODY MASS INDEX: 23.19 KG/M2 | WEIGHT: 162 LBS

## 2023-05-02 DIAGNOSIS — M54.2 NECK PAIN: Primary | ICD-10-CM

## 2023-05-10 ENCOUNTER — OFFICE VISIT (OUTPATIENT)
Dept: NEUROSURGERY | Facility: CLINIC | Age: 33
End: 2023-05-10
Payer: MEDICAID

## 2023-05-10 VITALS — HEIGHT: 70 IN | WEIGHT: 164 LBS | BODY MASS INDEX: 23.48 KG/M2

## 2023-05-10 DIAGNOSIS — Z78.9 NON-SMOKER: ICD-10-CM

## 2023-05-10 DIAGNOSIS — M54.2 NECK PAIN: Primary | ICD-10-CM

## 2023-05-10 PROCEDURE — 1159F MED LIST DOCD IN RCRD: CPT | Performed by: NURSE PRACTITIONER

## 2023-05-10 PROCEDURE — 99204 OFFICE O/P NEW MOD 45 MIN: CPT | Performed by: NURSE PRACTITIONER

## 2023-05-10 PROCEDURE — 1160F RVW MEDS BY RX/DR IN RCRD: CPT | Performed by: NURSE PRACTITIONER

## 2023-05-10 NOTE — PROGRESS NOTES
Chief complaint:   Chief Complaint   Patient presents with   • Neck Pain     Pt here for neck pain.       Subjective     HPI: This is a 32-year-old male gentleman who was referred to us by Dr. Joe Jones for neck pain.  He is here to be evaluated today.  The patient is deaf and the use of sign language interpretation was done.  It was initially started with  Michael ID number [id number] for the first 4 minutes and then did change to Yuliana ID number [id number] for 25 minutes.  The patient says that he has had neck pain for a few years.  The pain in his neck is intermittent.  It is worse with certain positions and when he is sleeping at night.  He says marijuana does seem to help make it better.  He does have pain that radiates over into his right trapezius region.  Denies any pain radiating past the shoulders.  Denies any numbness or tingling.  Denies any bowel or bladder incontinence but has not done any recent physical therapy or chiropractic care or pain management injections.  He is right-hand dominant.  Denies any tobacco.  He does drink alcohol and does use marijuana occasionally.  He is single.    Review of Systems   Constitutional: Positive for fatigue and unexpected weight change.   HENT: Positive for hearing loss.    Cardiovascular: Positive for chest pain.   Musculoskeletal: Positive for back pain, neck pain and neck stiffness.   Allergic/Immunologic: Positive for food allergies.   Psychiatric/Behavioral: Positive for confusion and sleep disturbance. The patient is nervous/anxious.         Past Medical History:   Diagnosis Date   • Anxiety    • Deaf    • Hearing impaired    • THA virus antibody positive    • Multiple sclerosis    • Seizure 04/2019     No past surgical history on file.  Family History   Problem Relation Age of Onset   • Dementia Maternal Grandmother    • Cancer Paternal Grandmother    • Hypertension Paternal Grandmother    • Heart disease Paternal Grandfather    • Heart disease  "Paternal Uncle      Social History     Tobacco Use   • Smoking status: Former     Types: Cigarettes   • Smokeless tobacco: Never   Vaping Use   • Vaping Use: Former   Substance Use Topics   • Alcohol use: Yes     Comment: socially   • Drug use: Yes     Types: Marijuana     (Not in a hospital admission)    Allergies:  Shellfish-derived products, Aldomet [methyldopa], Augmentin [amoxicillin-pot clavulanate], and Hydrocodone    Objective      Vital Signs  Ht 177.8 cm (70\")   Wt 74.4 kg (164 lb)   BMI 23.53 kg/m²     Physical Exam  Constitutional:       Appearance: Normal appearance. He is well-developed.   HENT:      Head: Normocephalic.      Ears:      Comments: Patient is deaf  Eyes:      General: Lids are normal.      Extraocular Movements: EOM normal.      Conjunctiva/sclera: Conjunctivae normal.      Pupils: Pupils are equal, round, and reactive to light.   Pulmonary:      Effort: Pulmonary effort is normal.      Breath sounds: Normal breath sounds.   Musculoskeletal:         General: Normal range of motion.      Cervical back: Normal range of motion.   Skin:     General: Skin is warm.   Neurological:      Mental Status: He is alert and oriented to person, place, and time.      GCS: GCS eye subscore is 4. GCS verbal subscore is 5. GCS motor subscore is 6.      Cranial Nerves: No cranial nerve deficit.      Sensory: No sensory deficit.      Motor: Motor strength is normal.      Gait: Gait is intact.      Deep Tendon Reflexes: Reflexes are normal and symmetric. Reflexes normal.   Psychiatric:         Speech: Speech normal.         Behavior: Behavior normal.         Thought Content: Thought content normal.         Neurologic Exam     Mental Status   Oriented to person, place, and time.   Attention: normal. Concentration: normal.   Speech: speech is normal   Level of consciousness: alert  Normal comprehension.     Cranial Nerves     CN II   Visual fields full to confrontation.     CN III, IV, VI   Pupils are equal, " round, and reactive to light.  Extraocular motions are normal.     CN V   Facial sensation intact.     CN VII   Facial expression full, symmetric.     CN VIII   CN VIII normal.     CN IX, X   CN IX normal.   CN X normal.     CN XI   CN XI normal.     CN XII   CN XII normal.     Motor Exam   Muscle bulk: normal    Strength   Strength 5/5 throughout.     Sensory Exam   Light touch normal.     Gait, Coordination, and Reflexes     Gait  Gait: normal    Reflexes   Reflexes 2+ except as noted.       Imaging review: MRI of the brain that was done here at [facility] on December 8, 2022 shows mild disc degeneration at C5-6 and 6-7.  There is right-sided foraminal narrowing at C5-6.  At C4-5 bilateral foraminal narrowing with the right being slightly worse than the left.  At C3-4 mild bilateral foraminal narrowing.  No fracture visualized.  No cord signal change.  No evidence of any cord abnormalities or abnormal enhancement.    MRI of the brain shows abnormal signal in the white matter that is consistent with demyelination/multiple sclerosis however there are no new lesions when compared to previous imaging or changes to the previous lesions that are noted.        Assessment/Plan: Patient is complaining of neck pain.  He does have a history of MS and does follow with [facility] neurology with treatments.  At this point I think it would be prudent to send him for x-rays of the cervical spine with flexion and extension.  For first line conservative care of cervical pain, I would like to send Mr. Hall for a dedicated course of physician directed physical therapy consisting of 2-3 times a week for 4-6 weeks.  Should he not make any improvement from the therapy we can consider getting injections at pain management    Return for reassessment with Dr. Ceja        I advised the patient to call and return sooner for new or worsening complaints of weakness, paresthesias, gait disturbances, or any additional concerns.  Treatment  options discussed in detail with Deangelo and the patient voiced understanding.  Mr. Hall agrees with this plan of care.     Patient is a nonsmoker  The patient's Body mass index is 23.53 kg/m².. BMI is within normal parameters. No follow-up required.    Diagnoses and all orders for this visit:    1. Neck pain (Primary)  -     Ambulatory Referral to Physical Therapy Evaluate and treat, Neuro; Strengthening, ROM, Stretching; Full weight bearing  -     XR Spine Cervical Complete With Obli Flex Ext    2. BMI 23.0-23.9, adult    3. Non-smoker          I discussed the patients findings and my recommendations with patient    Marcelo Richard, APRN  05/10/23  16:23 CDT

## 2023-09-06 ENCOUNTER — TELEPHONE (OUTPATIENT)
Dept: NEUROSURGERY | Facility: CLINIC | Age: 33
End: 2023-09-06
Payer: MEDICAID

## 2023-09-06 NOTE — TELEPHONE ENCOUNTER
Called patient to confirm his appt on 9/12/23 and his grandmother answered.  She asked me to call his Aunt Lucrecia.  I spoke w/Lucrecia and she said she isn't aware that the patient has done any therapy.  She is going to talk to him (he is deaf/mute) and will call me back to let me know.    CHANA MILLER [facility]  PHYSICIAN LEAD  DR RACIEL AMBROCIO  [facility]

## 2023-10-24 ENCOUNTER — TELEPHONE (OUTPATIENT)
Dept: NEUROLOGY | Facility: CLINIC | Age: 33
End: 2023-10-24
Payer: MEDICAID

## 2023-11-21 ENCOUNTER — APPOINTMENT (OUTPATIENT)
Dept: GENERAL RADIOLOGY | Facility: HOSPITAL | Age: 33
End: 2023-11-21
Payer: MEDICAID

## 2023-11-21 ENCOUNTER — HOSPITAL ENCOUNTER (EMERGENCY)
Facility: HOSPITAL | Age: 33
Discharge: HOME | End: 2023-11-21
Admitting: FAMILY MEDICINE
Payer: MEDICAID

## 2023-11-21 VITALS
OXYGEN SATURATION: 98 % | RESPIRATION RATE: 16 BRPM | DIASTOLIC BLOOD PRESSURE: 78 MMHG | SYSTOLIC BLOOD PRESSURE: 129 MMHG | HEIGHT: 70 IN | HEART RATE: 80 BPM | TEMPERATURE: 98.7 F | BODY MASS INDEX: 22.48 KG/M2 | WEIGHT: 157 LBS

## 2023-11-21 DIAGNOSIS — T14.8XXA MUSCLE STRAIN: Primary | ICD-10-CM

## 2023-11-21 PROCEDURE — 99283 EMERGENCY DEPT VISIT LOW MDM: CPT

## 2023-11-21 PROCEDURE — 73590 X-RAY EXAM OF LOWER LEG: CPT

## 2023-11-21 RX ORDER — MELOXICAM 7.5 MG/1
7.5 TABLET ORAL DAILY
Qty: 15 TABLET | Refills: 0 | Status: SHIPPED | OUTPATIENT
Start: 2023-11-21

## 2023-11-21 NOTE — Clinical Note
[facility] EMERGENCY DEPARTMENT  [address]  [facility] [address]  Phone: [phone]    Deangelo Hall was seen and treated in our emergency department on 11/21/2023.  He may return to work on 11/22/2023.         Thank you for choosing [facility].    Felisha Moe, APRN

## 2023-11-21 NOTE — ED PROVIDER NOTES
Subjective   History of Present Illness  Patient is a 34 yo male who presents to the ER with right calf pain for the past few days. He was doing heavy weight lifting and believes he strained or pulled muscle to the right calf. He now has pain to the calf after weight lifting or attempting to lift weight. He has had no erythema or warmth to the extremity.  Past medical history significant for anxiety, hearing impaired, MS, seizures        Review of Systems   Constitutional: Negative.  Negative for fever.   HENT: Negative.  Negative for congestion.    Eyes: Negative.    Respiratory: Negative.  Negative for cough and shortness of breath.    Cardiovascular: Negative.  Negative for chest pain.   Gastrointestinal: Negative.  Negative for abdominal pain, constipation, diarrhea, nausea and vomiting.   Genitourinary: Negative.  Negative for dysuria.   Musculoskeletal:         Positive for right calf pain   Skin: Negative.  Negative for color change, pallor and wound.   All other systems reviewed and are negative.      Past Medical History:   Diagnosis Date    Anxiety     Deaf     Hearing impaired     THA virus antibody positive     Multiple sclerosis     Seizure 04/2019       Allergies   Allergen Reactions    Shellfish-Derived Products Anaphylaxis    Aldomet [Methyldopa] Rash     Pt denies     Augmentin [Amoxicillin-Pot Clavulanate] Rash    Hydrocodone Nausea And Vomiting       History reviewed. No pertinent surgical history.    Family History   Problem Relation Age of Onset    Dementia Maternal Grandmother     Cancer Paternal Grandmother     Hypertension Paternal Grandmother     Heart disease Paternal Grandfather     Heart disease Paternal Uncle        Social History     Socioeconomic History    Marital status: Single   Tobacco Use    Smoking status: Former     Types: Cigarettes    Smokeless tobacco: Never   Vaping Use    Vaping Use: Former   Substance and Sexual Activity    Alcohol use: Yes     Comment: socially    Drug  use: Yes     Types: Marijuana    Sexual activity: Yes     Partners: Female     Birth control/protection: Condom           Objective   Physical Exam  Vitals and nursing note reviewed.   Constitutional:       General: He is not in acute distress.     Appearance: Normal appearance. He is well-developed. He is not diaphoretic.   HENT:      Head: Normocephalic and atraumatic.      Right Ear: External ear normal.      Left Ear: External ear normal.      Nose: Nose normal.      Mouth/Throat:      Pharynx: Oropharynx is clear.   Eyes:      General: No scleral icterus.     Extraocular Movements: Extraocular movements intact.      Conjunctiva/sclera: Conjunctivae normal.      Pupils: Pupils are equal, round, and reactive to light.   Neck:      Thyroid: No thyromegaly.      Vascular: No JVD.   Cardiovascular:      Rate and Rhythm: Normal rate and regular rhythm.      Heart sounds: Normal heart sounds. No murmur heard.  Pulmonary:      Effort: Pulmonary effort is normal. No respiratory distress.      Breath sounds: Normal breath sounds. No wheezing or rales.   Chest:      Chest wall: No tenderness.   Abdominal:      General: Bowel sounds are normal. There is no distension.      Palpations: Abdomen is soft. There is no mass.      Tenderness: There is no abdominal tenderness. There is no guarding or rebound.   Musculoskeletal:         General: Tenderness present. Normal range of motion.      Cervical back: Normal range of motion and neck supple.      Comments: Pain to palpation to the right calf, no swelling or warmth noted, no erythema noted, pulses palpable bilaterally   Lymphadenopathy:      Cervical: No cervical adenopathy.   Skin:     General: Skin is warm and dry.      Capillary Refill: Capillary refill takes less than 2 seconds.      Coloration: Skin is not pale.      Findings: No erythema or rash.   Neurological:      General: No focal deficit present.      Mental Status: He is alert and oriented to person, place, and  time.      Cranial Nerves: No cranial nerve deficit.      Coordination: Coordination normal.      Deep Tendon Reflexes: Reflexes are normal and symmetric.   Psychiatric:         Mood and Affect: Mood normal.         Behavior: Behavior normal.         Thought Content: Thought content normal.         Judgment: Judgment normal.      Comments: Patient is hearing impaired         Procedures           ED Course                                           Medical Decision Making  Patient is a 32 yo male who presents to the ER with right calf pain for the past few days. He was doing heavy weight lifting and believes he strained or pulled muscle to the right calf. He now has pain to the calf after weight lifting or attempting to lift weight. He has had no erythema or warmth to the extremity.  Past medical history significant for anxiety, hearing impaired, MS, seizures  Differential diagnosis: Muscle strain, muscle tear, ligamentous injury, bony fracture, and other    XR Tibia Fibula 2 View Right   Final Result      Patient presents with right calf pain after lifting weights.  He believes he may have strained or torn a muscle.  He has had discomfort since this date.  Low suspicion for DVT given clinical history.  X-ray is negative for acute findings.  Ace wrap was applied and recommend to follow-up with orthopedics with continued symptoms for further evaluation.  Patient will be discharged home shortly in stable condition.      Problems Addressed:  Muscle strain: acute illness or injury    Amount and/or Complexity of Data Reviewed  Radiology: ordered. Decision-making details documented in ED Course.    Risk  Prescription drug management.        Final diagnoses:   Muscle strain       ED Disposition  ED Disposition       ED Disposition   Discharge    Condition   Good    Comment   --               No follow-up provider specified.       Medication List        New Prescriptions      meloxicam 7.5 MG tablet  Commonly known as:  MOBIC  Take 1 tablet by mouth Daily.            Stop      ibuprofen 800 MG tablet  Commonly known as: ADVIL,MOTRIN               Where to Get Your Medications        These medications were sent to [facility]/pharmacy #[id number] - [place], [place] - [address] AT ACROSS FROM LA[address] AL[address] - [phone]  - [phone]   [address] [facility] [address]      Phone: [phone]   meloxicam 7.5 MG tablet            Felisha Moe, APRN  11/21/23 1911

## 2024-01-08 ENCOUNTER — TELEPHONE (OUTPATIENT)
Dept: NEUROLOGY | Facility: CLINIC | Age: 34
End: 2024-01-08
Payer: MEDICAID

## 2024-01-08 DIAGNOSIS — G35 MULTIPLE SCLEROSIS: Primary | ICD-10-CM

## 2024-01-08 NOTE — TELEPHONE ENCOUNTER
Explained that Maikel put in new orders.  She said the cancer center called and rescheduled the infusion.

## 2024-01-08 NOTE — TELEPHONE ENCOUNTER
Caller: siddharth juarez    Relationship: Emergency Contact; AUNT    Best call back number: [phone]    What was the call regarding: PT'S AUNT CALLED TO SEE IF PT'S APPT W/ JUDIE SALAZAR, CURRENTLY SCHEDULED ON 1/29/24, CAN BE MOVED UP. PT WAS SCHEDULED FOR HIS MS INFUSION THIS MORNING, HOWEVER, THIS HAD TO BE CANCELLED AS THEY CAN NO LONGER ACCEPT THE ORDER DUE TO IT BEING SUBMITTED BY DR. PLASCENCIA. PT'S AUNT STRESSES THAT PT NEEDS HIS INFUSION ASAP.    Do you require a callback: YES, PLEASE.    PLEASE REVIEW AND ADVISE.

## 2024-01-10 ENCOUNTER — LAB (OUTPATIENT)
Dept: LAB | Facility: HOSPITAL | Age: 34
End: 2024-01-10
Payer: MEDICAID

## 2024-01-10 ENCOUNTER — INFUSION (OUTPATIENT)
Dept: ONCOLOGY | Facility: HOSPITAL | Age: 34
End: 2024-01-10
Payer: MEDICAID

## 2024-01-10 VITALS
DIASTOLIC BLOOD PRESSURE: 76 MMHG | HEART RATE: 65 BPM | BODY MASS INDEX: 22.76 KG/M2 | OXYGEN SATURATION: 98 % | WEIGHT: 159 LBS | SYSTOLIC BLOOD PRESSURE: 133 MMHG | RESPIRATION RATE: 16 BRPM | TEMPERATURE: 97.6 F | HEIGHT: 70 IN

## 2024-01-10 DIAGNOSIS — G35 MULTIPLE SCLEROSIS: Primary | ICD-10-CM

## 2024-01-10 LAB
ALBUMIN SERPL-MCNC: 4.8 G/DL (ref 3.5–5.2)
ALBUMIN/GLOB SERPL: 1.5 G/DL
ALP SERPL-CCNC: 66 U/L (ref 39–117)
ALT SERPL W P-5'-P-CCNC: 16 U/L (ref 1–41)
ANION GAP SERPL CALCULATED.3IONS-SCNC: 9 MMOL/L (ref 5–15)
AST SERPL-CCNC: 26 U/L (ref 1–40)
BASOPHILS # BLD AUTO: 0.02 10*3/MM3 (ref 0–0.2)
BASOPHILS NFR BLD AUTO: 0.3 % (ref 0–1.5)
BILIRUB SERPL-MCNC: 0.8 MG/DL (ref 0–1.2)
BUN SERPL-MCNC: 11 MG/DL (ref 6–20)
BUN/CREAT SERPL: 12.9 (ref 7–25)
CALCIUM SPEC-SCNC: 9.3 MG/DL (ref 8.6–10.5)
CHLORIDE SERPL-SCNC: 103 MMOL/L (ref 98–107)
CO2 SERPL-SCNC: 27 MMOL/L (ref 22–29)
CREAT SERPL-MCNC: 0.85 MG/DL (ref 0.76–1.27)
DEPRECATED RDW RBC AUTO: 39.1 FL (ref 37–54)
EGFRCR SERPLBLD CKD-EPI 2021: 116.2 ML/MIN/1.73
EOSINOPHIL # BLD AUTO: 0.16 10*3/MM3 (ref 0–0.4)
EOSINOPHIL NFR BLD AUTO: 2.7 % (ref 0.3–6.2)
ERYTHROCYTE [DISTWIDTH] IN BLOOD BY AUTOMATED COUNT: 12.7 % (ref 12.3–15.4)
GLOBULIN UR ELPH-MCNC: 3.2 GM/DL
GLUCOSE SERPL-MCNC: 112 MG/DL (ref 65–99)
HCT VFR BLD AUTO: 43.2 % (ref 37.5–51)
HGB BLD-MCNC: 13.5 G/DL (ref 13–17.7)
HOLD SPECIMEN: NORMAL
IMM GRANULOCYTES # BLD AUTO: 0.01 10*3/MM3 (ref 0–0.05)
IMM GRANULOCYTES NFR BLD AUTO: 0.2 % (ref 0–0.5)
LYMPHOCYTES # BLD AUTO: 1.95 10*3/MM3 (ref 0.7–3.1)
LYMPHOCYTES NFR BLD AUTO: 32.8 % (ref 19.6–45.3)
MCH RBC QN AUTO: 26.4 PG (ref 26.6–33)
MCHC RBC AUTO-ENTMCNC: 31.3 G/DL (ref 31.5–35.7)
MCV RBC AUTO: 84.4 FL (ref 79–97)
MONOCYTES # BLD AUTO: 0.7 10*3/MM3 (ref 0.1–0.9)
MONOCYTES NFR BLD AUTO: 11.8 % (ref 5–12)
NEUTROPHILS NFR BLD AUTO: 3.11 10*3/MM3 (ref 1.7–7)
NEUTROPHILS NFR BLD AUTO: 52.2 % (ref 42.7–76)
NRBC BLD AUTO-RTO: 0 /100 WBC (ref 0–0.2)
PLATELET # BLD AUTO: 248 10*3/MM3 (ref 140–450)
PMV BLD AUTO: 9.6 FL (ref 6–12)
POTASSIUM SERPL-SCNC: 3.6 MMOL/L (ref 3.5–5.2)
PROT SERPL-MCNC: 8 G/DL (ref 6–8.5)
RBC # BLD AUTO: 5.12 10*6/MM3 (ref 4.14–5.8)
SODIUM SERPL-SCNC: 139 MMOL/L (ref 136–145)
WBC NRBC COR # BLD AUTO: 5.95 10*3/MM3 (ref 3.4–10.8)

## 2024-01-10 PROCEDURE — 96366 THER/PROPH/DIAG IV INF ADDON: CPT

## 2024-01-10 PROCEDURE — 96413 CHEMO IV INFUSION 1 HR: CPT

## 2024-01-10 PROCEDURE — 36415 COLL VENOUS BLD VENIPUNCTURE: CPT

## 2024-01-10 PROCEDURE — 80053 COMPREHEN METABOLIC PANEL: CPT

## 2024-01-10 PROCEDURE — 85025 COMPLETE CBC W/AUTO DIFF WBC: CPT

## 2024-01-10 PROCEDURE — 96415 CHEMO IV INFUSION ADDL HR: CPT

## 2024-01-10 PROCEDURE — 96365 THER/PROPH/DIAG IV INF INIT: CPT

## 2024-01-10 PROCEDURE — 25810000003 SODIUM CHLORIDE 0.9 % SOLUTION 500 ML FLEX CONT: Performed by: PHYSICIAN ASSISTANT

## 2024-01-10 PROCEDURE — 25810000003 SODIUM CHLORIDE 0.9 % SOLUTION: Performed by: PHYSICIAN ASSISTANT

## 2024-01-10 PROCEDURE — 25010000002 METHYLPREDNISOLONE PER 125 MG: Performed by: PHYSICIAN ASSISTANT

## 2024-01-10 PROCEDURE — 96375 TX/PRO/DX INJ NEW DRUG ADDON: CPT

## 2024-01-10 PROCEDURE — 25010000002 DIPHENHYDRAMINE PER 50 MG: Performed by: PHYSICIAN ASSISTANT

## 2024-01-10 PROCEDURE — 25010000002 OCRELIZUMAB 300 MG/10ML SOLUTION 300 MG VIAL: Performed by: PHYSICIAN ASSISTANT

## 2024-01-10 RX ORDER — METHYLPREDNISOLONE SODIUM SUCCINATE 125 MG/2ML
100 INJECTION, POWDER, LYOPHILIZED, FOR SOLUTION INTRAMUSCULAR; INTRAVENOUS ONCE
Status: COMPLETED | OUTPATIENT
Start: 2024-01-10 | End: 2024-01-10

## 2024-01-10 RX ORDER — ACETAMINOPHEN 325 MG/1
650 TABLET ORAL ONCE
Status: COMPLETED | OUTPATIENT
Start: 2024-01-10 | End: 2024-01-10

## 2024-01-10 RX ORDER — DIPHENHYDRAMINE HYDROCHLORIDE 50 MG/ML
25 INJECTION INTRAMUSCULAR; INTRAVENOUS ONCE
OUTPATIENT
Start: 2024-07-10

## 2024-01-10 RX ORDER — DIPHENHYDRAMINE HYDROCHLORIDE 50 MG/ML
50 INJECTION INTRAMUSCULAR; INTRAVENOUS AS NEEDED
OUTPATIENT
Start: 2024-07-10

## 2024-01-10 RX ORDER — SODIUM CHLORIDE 9 MG/ML
250 INJECTION, SOLUTION INTRAVENOUS ONCE
OUTPATIENT
Start: 2024-07-10

## 2024-01-10 RX ORDER — ACETAMINOPHEN 325 MG/1
650 TABLET ORAL ONCE
OUTPATIENT
Start: 2024-07-10

## 2024-01-10 RX ORDER — DIPHENHYDRAMINE HYDROCHLORIDE 50 MG/ML
25 INJECTION INTRAMUSCULAR; INTRAVENOUS ONCE
Status: COMPLETED | OUTPATIENT
Start: 2024-01-10 | End: 2024-01-10

## 2024-01-10 RX ORDER — FAMOTIDINE 10 MG/ML
20 INJECTION, SOLUTION INTRAVENOUS AS NEEDED
OUTPATIENT
Start: 2024-07-10

## 2024-01-10 RX ORDER — SODIUM CHLORIDE 9 MG/ML
250 INJECTION, SOLUTION INTRAVENOUS ONCE
Status: COMPLETED | OUTPATIENT
Start: 2024-01-10 | End: 2024-01-10

## 2024-01-10 RX ORDER — METHYLPREDNISOLONE SODIUM SUCCINATE 125 MG/2ML
100 INJECTION, POWDER, LYOPHILIZED, FOR SOLUTION INTRAMUSCULAR; INTRAVENOUS ONCE
OUTPATIENT
Start: 2024-07-10

## 2024-01-10 RX ORDER — MEPERIDINE HYDROCHLORIDE 50 MG/ML
25 INJECTION INTRAMUSCULAR; INTRAVENOUS; SUBCUTANEOUS
OUTPATIENT
Start: 2024-07-10

## 2024-01-10 RX ADMIN — DIPHENHYDRAMINE HYDROCHLORIDE 25 MG: 50 INJECTION, SOLUTION INTRAMUSCULAR; INTRAVENOUS at 09:13

## 2024-01-10 RX ADMIN — ACETAMINOPHEN 650 MG: 325 TABLET, FILM COATED ORAL at 09:17

## 2024-01-10 RX ADMIN — OCRELIZUMAB 600 MG: 300 INJECTION INTRAVENOUS at 09:50

## 2024-01-10 RX ADMIN — METHYLPREDNISOLONE SODIUM SUCCINATE 100 MG: 125 INJECTION, POWDER, FOR SOLUTION INTRAMUSCULAR; INTRAVENOUS at 09:16

## 2024-01-10 RX ADMIN — SODIUM CHLORIDE 250 ML: 9 INJECTION, SOLUTION INTRAVENOUS at 09:05

## 2024-01-10 NOTE — PROGRESS NOTES
Used hearing impaired Leo Sanchez, interpretor # [id number] for assessment and explanation of the infusion time. Patient had not complaints and voiced understanding when I told him infusion will be most of the day and he would wait an hour after infusion before I could let him leave, again, he voiced understanding via Leo.

## 2024-01-29 ENCOUNTER — OFFICE VISIT (OUTPATIENT)
Dept: NEUROLOGY | Facility: CLINIC | Age: 34
End: 2024-01-29
Payer: MEDICAID

## 2024-01-29 VITALS
DIASTOLIC BLOOD PRESSURE: 80 MMHG | SYSTOLIC BLOOD PRESSURE: 130 MMHG | BODY MASS INDEX: 22.9 KG/M2 | HEART RATE: 67 BPM | WEIGHT: 160 LBS | OXYGEN SATURATION: 99 % | HEIGHT: 70 IN

## 2024-01-29 DIAGNOSIS — H91.93 BILATERAL DEAFNESS: ICD-10-CM

## 2024-01-29 DIAGNOSIS — E55.9 VITAMIN D DEFICIENCY: ICD-10-CM

## 2024-01-29 DIAGNOSIS — G35 MULTIPLE SCLEROSIS: Primary | ICD-10-CM

## 2024-01-29 PROCEDURE — 1160F RVW MEDS BY RX/DR IN RCRD: CPT | Performed by: PHYSICIAN ASSISTANT

## 2024-01-29 PROCEDURE — 1159F MED LIST DOCD IN RCRD: CPT | Performed by: PHYSICIAN ASSISTANT

## 2024-01-29 PROCEDURE — 99214 OFFICE O/P EST MOD 30 MIN: CPT | Performed by: PHYSICIAN ASSISTANT

## 2024-01-29 RX ORDER — ERGOCALCIFEROL 1.25 MG/1
50000 CAPSULE ORAL WEEKLY
Qty: 12 CAPSULE | Refills: 3 | Status: SHIPPED | OUTPATIENT
Start: 2024-01-29

## 2024-01-29 NOTE — PROGRESS NOTES
Subjective   Deangelo Hall is a 33 y.o. male is here today for follow-up.    History of Present Illness   [id number].  No neurologic complaints.  Complains of alopecia.  Compliant with Ocrevus.  Working without difficulties.  Spending time at the gym and working out with no difficulties.       The following portions of the patient's history were reviewed and updated as appropriate: allergies, current medications, past family history, past medical history, past social history, past surgical history and problem list.    Review of Systems   Constitutional: Negative.    Eyes: Negative.    Respiratory: Negative.     Cardiovascular: Negative.    Gastrointestinal: Negative.    Musculoskeletal:  Positive for back pain.   Skin:         Alopecia    Neurological: Negative.    Psychiatric/Behavioral: Negative.           Current Outpatient Medications:     FLUoxetine (PROzac) 20 MG capsule, Take 1 capsule by mouth Daily., Disp: 30 capsule, Rfl: 2    meloxicam (MOBIC) 7.5 MG tablet, Take 1 tablet by mouth Daily., Disp: 15 tablet, Rfl: 0    tiZANidine (ZANAFLEX) 4 MG tablet, Take 1 tablet by mouth Every 8 (Eight) Hours As Needed for Muscle Spasms., Disp: 30 tablet, Rfl: 0    vitamin D (ERGOCALCIFEROL) 1.25 MG (95527 UT) capsule capsule, Take 1 capsule by mouth 1 (One) Time Per Week., Disp: 12 capsule, Rfl: 3     Objective   Physical Exam  Vitals and nursing note reviewed.   HENT:      Right Ear: Decreased hearing (DEAF) noted.      Left Ear: Decreased hearing (DEAF) noted.      Ears:      Comments: Deaf, uses ASL  Eyes:      General: Vision grossly intact. Gaze aligned appropriately.   Cardiovascular:      Rate and Rhythm: Normal rate.   Pulmonary:      Effort: Pulmonary effort is normal.   Neurological:      Mental Status: He is alert and oriented to person, place, and time.      Cranial Nerves: Cranial nerve deficit present.      Sensory: Sensation is intact.      Motor: Motor function is intact.      Coordination:  Coordination is intact.      Gait: Gait is intact.      Deep Tendon Reflexes: Reflexes are normal and symmetric.   Psychiatric:         Attention and Perception: Attention normal.         Mood and Affect: Mood normal.         Speech: Speech normal.         Behavior: Behavior normal.         Cognition and Memory: Cognition normal.           Assessment & Plan   Diagnoses and all orders for this visit:    1. Multiple sclerosis (Primary)  -     MRI Brain With & Without Contrast; Future    2. Bilateral deafness    3. Vitamin D deficiency  -     vitamin D (ERGOCALCIFEROL) 1.25 MG (40769 UT) capsule capsule; Take 1 capsule by mouth 1 (One) Time Per Week.  Dispense: 12 capsule; Refill: 3                     Dictated utilizing Dragon dictation.

## 2024-01-31 ENCOUNTER — PATIENT ROUNDING (BHMG ONLY) (OUTPATIENT)
Dept: NEUROLOGY | Facility: CLINIC | Age: 34
End: 2024-01-31
Payer: MEDICAID

## 2024-01-31 NOTE — PROGRESS NOTES
January 31, 2024    Hello, may I speak with Deangelo Hall?    My name is bossman      I am  with [facility]  [address]  [facility] [address] [phone].    Before we get started may I verify your date of birth? 1990    I am calling to officially welcome you to our practice and ask about your recent visit. Is this a good time to talk? yes    Tell me about your visit with us. What things went well?  great       We're always looking for ways to make our patients' experiences even better. Do you have recommendations on ways we may improve?  no    Overall were you satisfied with your first visit to our practice? yes       I appreciate you taking the time to speak with me today. Is there anything else I can do for you? no      Thank you, and have a great day.

## 2024-02-06 ENCOUNTER — HOSPITAL ENCOUNTER (OUTPATIENT)
Dept: GENERAL RADIOLOGY | Age: 34
Discharge: HOME | End: 2024-02-06
Payer: MEDICAID

## 2024-02-06 ENCOUNTER — OFFICE VISIT (OUTPATIENT)
Age: 34
End: 2024-02-06
Payer: MEDICAID

## 2024-02-06 VITALS
TEMPERATURE: 97.7 F | WEIGHT: 158.2 LBS | BODY MASS INDEX: 22.65 KG/M2 | RESPIRATION RATE: 20 BRPM | DIASTOLIC BLOOD PRESSURE: 82 MMHG | OXYGEN SATURATION: 98 % | SYSTOLIC BLOOD PRESSURE: 142 MMHG | HEART RATE: 81 BPM | HEIGHT: 70 IN

## 2024-02-06 DIAGNOSIS — M62.838 MUSCLE SPASM: ICD-10-CM

## 2024-02-06 DIAGNOSIS — T14.8XXA MUSCLE STRAIN: ICD-10-CM

## 2024-02-06 DIAGNOSIS — G89.29 CHRONIC MIDLINE LOW BACK PAIN WITHOUT SCIATICA: Primary | ICD-10-CM

## 2024-02-06 DIAGNOSIS — G89.29 CHRONIC MIDLINE LOW BACK PAIN WITHOUT SCIATICA: ICD-10-CM

## 2024-02-06 DIAGNOSIS — M54.50 CHRONIC MIDLINE LOW BACK PAIN WITHOUT SCIATICA: Primary | ICD-10-CM

## 2024-02-06 DIAGNOSIS — M54.50 CHRONIC MIDLINE LOW BACK PAIN WITHOUT SCIATICA: ICD-10-CM

## 2024-02-06 PROCEDURE — 99203 OFFICE O/P NEW LOW 30 MIN: CPT

## 2024-02-06 PROCEDURE — 72100 X-RAY EXAM L-S SPINE 2/3 VWS: CPT

## 2024-02-06 RX ORDER — TIZANIDINE 4 MG/1
4 TABLET ORAL 3 TIMES DAILY PRN
Qty: 21 TABLET | Refills: 0 | Status: SHIPPED | OUTPATIENT
Start: 2024-02-06 | End: 2024-02-13

## 2024-02-06 RX ORDER — METHYLPREDNISOLONE 4 MG/1
TABLET ORAL
Qty: 1 KIT | Refills: 0 | Status: SHIPPED | OUTPATIENT
Start: 2024-02-06

## 2024-02-06 ASSESSMENT — ENCOUNTER SYMPTOMS
CHOKING: 0
NAUSEA: 0
VOMITING: 0
EYE REDNESS: 0
SHORTNESS OF BREATH: 0
TROUBLE SWALLOWING: 0
COLOR CHANGE: 0
DIARRHEA: 0
BACK PAIN: 1
SINUS PRESSURE: 0
EYE DISCHARGE: 0
WHEEZING: 0
EYE PAIN: 0
CHEST TIGHTNESS: 0
ABDOMINAL DISTENTION: 0
SORE THROAT: 0
FACIAL SWELLING: 0
APNEA: 0
SINUS PAIN: 0
STRIDOR: 0
COUGH: 0
ABDOMINAL PAIN: 0
CONSTIPATION: 0

## 2024-02-06 NOTE — PATIENT INSTRUCTIONS
Xray of lumbar spine ordered. We will call with results    Medrol dose pack prescribed; start tomorrow.    Tizanidine prescribed; may take 1 tab today upon returning home, then take daily at bedtime; may cause drowsiness, do not drive or operate heavy machinery while taking this medication.    Take tylenol/ibuprofen as needed for pain.    Use topical heat/ice as needed for pain.    F/u with PCP if symptoms worsening or not improving.     Patient was given a note stating that he was seen in office today and that he should be put on light duty at work for 2 weeks. If symptoms persist, patient is to follow up with PCP.    Any condition can change, despite proper treatment. Therefore, if symptoms still persist or worsen after treatment plan intitated today, either go to the nearest ER, or call PCP, or return to UC for further evaluation.    Urgent Care evaluation today is not a substitute for PCP visit. Follow up care is your responsibility to discuss and review this UC visit.

## 2024-02-06 NOTE — PROGRESS NOTES
[facility]  [facility]  [address] [place] [address] [place] [address]  Dept: [phone]  Dept Fax: [phone]  Loc: [phone]    Donaldo Angulo is a 33 y.o. male who presents today for his medical conditions/complaints as noted below.  Donaldo Angulo is complaining of Lower Back Pain        HPI:   Patient presents to clinic today with complaints of lower back pain.  Patient reports he has chronic lower back pain and history of multiple sclerosis. Reports he has started lifting weights recently and this flare of pain started 1-2 weeks ago. Pain feels like a muscle spasm, is 3/10 at rest, 5/10 with activity. Patient has tried taking tylenol/motrin as needed but says it does not help much.  Denies numbness, tingling, paresthesias, loss of bowel/bladder control.  Patient says his last spine x-ray was probably over 2 years ago.  Symptoms are moderate.  Patient is stable    Patient is deaf and requires a . Interpretor's name was Loree #[id number]        Past Medical History:   Diagnosis Date    Deaf        History reviewed. No pertinent surgical history.    History reviewed. No pertinent family history.    Social History     Tobacco Use    Smoking status: Not on file    Smokeless tobacco: Not on file   Substance Use Topics    Alcohol use: Not on file        Current Outpatient Medications   Medication Sig Dispense Refill    tiZANidine (ZANAFLEX) 4 MG tablet Take 1 tablet by mouth 3 times daily as needed (muscle spasms, pain) 21 tablet 0    methylPREDNISolone (MEDROL DOSEPACK) 4 MG tablet Take by mouth. 1 kit 0     No current facility-administered medications for this visit.       Allergies   Allergen Reactions    Shellfish-Derived Products        Health Maintenance   Topic Date Due    Hepatitis B vaccine (1 of 3 - 3-dose series) Never done    Varicella vaccine (1 of 2 - 2-dose childhood series) Never done    Depression Screen  Never done    HIV

## 2024-03-22 ENCOUNTER — OFFICE VISIT (OUTPATIENT)
Age: 34
End: 2024-03-22

## 2024-03-22 VITALS
TEMPERATURE: 98.2 F | BODY MASS INDEX: 23.24 KG/M2 | DIASTOLIC BLOOD PRESSURE: 72 MMHG | RESPIRATION RATE: 20 BRPM | OXYGEN SATURATION: 98 % | SYSTOLIC BLOOD PRESSURE: 122 MMHG | WEIGHT: 162 LBS | HEART RATE: 72 BPM

## 2024-03-22 DIAGNOSIS — H92.02 ACUTE OTALGIA, LEFT: ICD-10-CM

## 2024-03-22 DIAGNOSIS — J02.9 PHARYNGITIS, UNSPECIFIED ETIOLOGY: ICD-10-CM

## 2024-03-22 DIAGNOSIS — H60.502 ACUTE OTITIS EXTERNA OF LEFT EAR, UNSPECIFIED TYPE: Primary | ICD-10-CM

## 2024-03-22 LAB — S PYO AG THROAT QL: NORMAL

## 2024-03-22 RX ORDER — KETOROLAC TROMETHAMINE 30 MG/ML
30 INJECTION, SOLUTION INTRAMUSCULAR; INTRAVENOUS ONCE
Status: COMPLETED | OUTPATIENT
Start: 2024-03-22 | End: 2024-03-22

## 2024-03-22 RX ORDER — CIPROFLOXACIN AND DEXAMETHASONE 3; 1 MG/ML; MG/ML
4 SUSPENSION/ DROPS AURICULAR (OTIC) 2 TIMES DAILY
Qty: 1 EACH | Refills: 0 | Status: SHIPPED | OUTPATIENT
Start: 2024-03-22 | End: 2024-03-29

## 2024-03-22 RX ADMIN — KETOROLAC TROMETHAMINE 30 MG: 30 INJECTION, SOLUTION INTRAMUSCULAR; INTRAVENOUS at 18:42

## 2024-03-22 ASSESSMENT — ENCOUNTER SYMPTOMS
CHEST TIGHTNESS: 0
RHINORRHEA: 0
EYE DISCHARGE: 0
NAUSEA: 0
VOMITING: 0
BACK PAIN: 0
SHORTNESS OF BREATH: 0
SINUS PAIN: 0
CONSTIPATION: 0
DIARRHEA: 0
WHEEZING: 0
ALLERGIC/IMMUNOLOGIC NEGATIVE: 1
COUGH: 0
EYE ITCHING: 0
ABDOMINAL PAIN: 0
SORE THROAT: 1

## 2024-03-22 NOTE — PROGRESS NOTES
[facility]  [facility]  [address] [place] [address] [place] [address]  Dept: [phone]  Dept Fax: [phone]  Loc: [phone]    Donaldo Angulo is a 33 y.o. male who presents today for his medical conditions/complaints as noted below.  Donaldo Angulo is complaining of Otalgia (left) and Sore Throat    HPI:     Donaldo Angulo presents to clinic with his mother for evaluation of sore throat and left ear pain. Symptoms began at 1700 today. Denies exposure to known sick contacts. Denies any alleviating factors. Denies fever. Denies any other symptoms such as cough, congestion, body aches, headache. He is stable.     Past Medical History:   Diagnosis Date    Deaf        History reviewed. No pertinent surgical history.    History reviewed. No pertinent family history.    Social History     Tobacco Use    Smoking status: Not on file    Smokeless tobacco: Not on file   Substance Use Topics    Alcohol use: Not on file        Current Outpatient Medications   Medication Sig Dispense Refill    ciprofloxacin-dexAMETHasone (CIPRODEX) 0.3-0.1 % otic suspension Place 4 drops into the left ear 2 times daily for 7 days 1 each 0    methylPREDNISolone (MEDROL DOSEPACK) 4 MG tablet Take by mouth. 1 kit 0     No current facility-administered medications for this visit.       Allergies   Allergen Reactions    Shellfish-Derived Products        Health Maintenance   Topic Date Due    Hepatitis B vaccine (1 of 3 - 3-dose series) Never done    Varicella vaccine (1 of 2 - 2-dose childhood series) Never done    Depression Screen  Never done    HIV screen  Never done    Hepatitis C screen  Never done    Flu vaccine (1) Never done    COVID-19 Vaccine (3 - 2023-24 season) 09/01/2023    DTaP/Tdap/Td vaccine (2 - Td or Tdap) 12/21/2023    Hepatitis A vaccine  Aged Out    Hib vaccine  Aged Out    HPV vaccine  Aged Out    Polio vaccine  Aged Out    Meningococcal (ACWY) vaccine  Aged Out

## 2024-03-22 NOTE — PROGRESS NOTES
Medication was administered by Jung Interiano MA at 6:43 PM.    Medication: ketorolac tromethamine(toradol) 30mg   Amount: 1ml  Route: intramuscular  Site: Dorsogluteal right    Patient tolerated well.

## 2024-03-22 NOTE — PATIENT INSTRUCTIONS
Pharyngitis:  Strep test negative.   Throat culture pending. Discussed in office that if throat culture grows harmful bacteria, an antibiotic will be prescribed. If throat culture is negative for harmful bacteria, etiology is likely viral.  Toradol injection given in clinic. Do not take anymore NSAIDs today.   Increase fluids. Avoid carbonated beverages, as this can irritate the throat more   Throat lozenges, chloraseptic sprays, honey, or salt water gargles, as needed  Recommended OTC antihistamine such as claritan or zyrtec   Monitor for fever and treat as needed with tylenol or ibuprofen, unless contraindicated.     Otitis Externa:  Use ciprodex as directed.   2.  Keep affected ear dry until treatment is completed. If needed, may use over the counter silicone plugs or a cotton ball coated with vasoline when bathing.   3. Tylenol / motrin for fever/pain, unless contraindicated     The patient is to follow up with PCP or return to clinic if symptoms worsen/fail to improve.    Any condition can change, despite proper treatment. Therefore, if symptoms still persist or worsen after treatment plan intitated today, patient is to go to the nearest ER, call PCP, or return to urgent care for further evaluation.     Urgent Care evaluation today is not a substitute for PCP visit. Follow up care is the patient's responsibility to discuss and review this UC visit.

## 2024-03-24 LAB — BACTERIA THROAT AEROBE CULT: NORMAL

## 2024-03-25 LAB — BACTERIA THROAT AEROBE CULT: NORMAL

## 2024-07-10 ENCOUNTER — LAB (OUTPATIENT)
Dept: LAB | Facility: HOSPITAL | Age: 34
End: 2024-07-10
Payer: MEDICAID

## 2024-07-10 ENCOUNTER — INFUSION (OUTPATIENT)
Dept: ONCOLOGY | Facility: HOSPITAL | Age: 34
End: 2024-07-10
Payer: MEDICAID

## 2024-07-10 VITALS
BODY MASS INDEX: 23.48 KG/M2 | SYSTOLIC BLOOD PRESSURE: 134 MMHG | HEIGHT: 70 IN | WEIGHT: 164 LBS | HEART RATE: 66 BPM | TEMPERATURE: 97.9 F | DIASTOLIC BLOOD PRESSURE: 83 MMHG | OXYGEN SATURATION: 100 % | RESPIRATION RATE: 18 BRPM

## 2024-07-10 DIAGNOSIS — G35 MULTIPLE SCLEROSIS: Primary | ICD-10-CM

## 2024-07-10 DIAGNOSIS — G35 MULTIPLE SCLEROSIS: ICD-10-CM

## 2024-07-10 DIAGNOSIS — H91.93 BILATERAL DEAFNESS: ICD-10-CM

## 2024-07-10 LAB
ALBUMIN SERPL-MCNC: 4.8 G/DL (ref 3.5–5.2)
ALBUMIN/GLOB SERPL: 1.7 G/DL
ALP SERPL-CCNC: 70 U/L (ref 39–117)
ALT SERPL W P-5'-P-CCNC: 21 U/L (ref 1–41)
ANION GAP SERPL CALCULATED.3IONS-SCNC: 7 MMOL/L (ref 5–15)
AST SERPL-CCNC: 21 U/L (ref 1–40)
BASOPHILS # BLD AUTO: 0.04 10*3/MM3 (ref 0–0.2)
BASOPHILS NFR BLD AUTO: 0.7 % (ref 0–1.5)
BILIRUB SERPL-MCNC: 0.7 MG/DL (ref 0–1.2)
BUN SERPL-MCNC: 12 MG/DL (ref 6–20)
BUN/CREAT SERPL: 13.2 (ref 7–25)
CALCIUM SPEC-SCNC: 9.7 MG/DL (ref 8.6–10.5)
CHLORIDE SERPL-SCNC: 103 MMOL/L (ref 98–107)
CO2 SERPL-SCNC: 30 MMOL/L (ref 22–29)
CREAT SERPL-MCNC: 0.91 MG/DL (ref 0.76–1.27)
DEPRECATED RDW RBC AUTO: 39.4 FL (ref 37–54)
EGFRCR SERPLBLD CKD-EPI 2021: 112.7 ML/MIN/1.73
EOSINOPHIL # BLD AUTO: 0.13 10*3/MM3 (ref 0–0.4)
EOSINOPHIL NFR BLD AUTO: 2.2 % (ref 0.3–6.2)
ERYTHROCYTE [DISTWIDTH] IN BLOOD BY AUTOMATED COUNT: 13.2 % (ref 12.3–15.4)
GLOBULIN UR ELPH-MCNC: 2.9 GM/DL
GLUCOSE SERPL-MCNC: 112 MG/DL (ref 65–99)
HCT VFR BLD AUTO: 41.7 % (ref 37.5–51)
HGB BLD-MCNC: 13.5 G/DL (ref 13–17.7)
IMM GRANULOCYTES # BLD AUTO: 0.02 10*3/MM3 (ref 0–0.05)
IMM GRANULOCYTES NFR BLD AUTO: 0.3 % (ref 0–0.5)
LYMPHOCYTES # BLD AUTO: 1.78 10*3/MM3 (ref 0.7–3.1)
LYMPHOCYTES NFR BLD AUTO: 30.1 % (ref 19.6–45.3)
MCH RBC QN AUTO: 26.8 PG (ref 26.6–33)
MCHC RBC AUTO-ENTMCNC: 32.4 G/DL (ref 31.5–35.7)
MCV RBC AUTO: 82.9 FL (ref 79–97)
MONOCYTES # BLD AUTO: 0.63 10*3/MM3 (ref 0.1–0.9)
MONOCYTES NFR BLD AUTO: 10.6 % (ref 5–12)
NEUTROPHILS NFR BLD AUTO: 3.32 10*3/MM3 (ref 1.7–7)
NEUTROPHILS NFR BLD AUTO: 56.1 % (ref 42.7–76)
NRBC BLD AUTO-RTO: 0 /100 WBC (ref 0–0.2)
PLATELET # BLD AUTO: 231 10*3/MM3 (ref 140–450)
PMV BLD AUTO: 9.9 FL (ref 6–12)
POTASSIUM SERPL-SCNC: 4 MMOL/L (ref 3.5–5.2)
PROT SERPL-MCNC: 7.7 G/DL (ref 6–8.5)
RBC # BLD AUTO: 5.03 10*6/MM3 (ref 4.14–5.8)
SODIUM SERPL-SCNC: 140 MMOL/L (ref 136–145)
WBC NRBC COR # BLD AUTO: 5.92 10*3/MM3 (ref 3.4–10.8)

## 2024-07-10 PROCEDURE — 25810000003 SODIUM CHLORIDE 0.9 % SOLUTION 500 ML FLEX CONT: Performed by: PHYSICIAN ASSISTANT

## 2024-07-10 PROCEDURE — 80053 COMPREHEN METABOLIC PANEL: CPT

## 2024-07-10 PROCEDURE — 85025 COMPLETE CBC W/AUTO DIFF WBC: CPT

## 2024-07-10 PROCEDURE — 25010000002 METHYLPREDNISOLONE PER 125 MG: Performed by: PHYSICIAN ASSISTANT

## 2024-07-10 PROCEDURE — 96375 TX/PRO/DX INJ NEW DRUG ADDON: CPT

## 2024-07-10 PROCEDURE — 96365 THER/PROPH/DIAG IV INF INIT: CPT

## 2024-07-10 PROCEDURE — 25010000002 OCRELIZUMAB 300 MG/10ML SOLUTION 300 MG VIAL: Performed by: PHYSICIAN ASSISTANT

## 2024-07-10 PROCEDURE — 96413 CHEMO IV INFUSION 1 HR: CPT

## 2024-07-10 PROCEDURE — 96366 THER/PROPH/DIAG IV INF ADDON: CPT

## 2024-07-10 PROCEDURE — 25810000003 SODIUM CHLORIDE 0.9 % SOLUTION: Performed by: PHYSICIAN ASSISTANT

## 2024-07-10 PROCEDURE — 25010000002 DIPHENHYDRAMINE PER 50 MG: Performed by: PHYSICIAN ASSISTANT

## 2024-07-10 PROCEDURE — 96415 CHEMO IV INFUSION ADDL HR: CPT

## 2024-07-10 RX ORDER — SODIUM CHLORIDE 9 MG/ML
250 INJECTION, SOLUTION INTRAVENOUS ONCE
OUTPATIENT
Start: 2025-01-08

## 2024-07-10 RX ORDER — MEPERIDINE HYDROCHLORIDE 50 MG/ML
25 INJECTION INTRAMUSCULAR; INTRAVENOUS; SUBCUTANEOUS
OUTPATIENT
Start: 2025-01-08

## 2024-07-10 RX ORDER — DIPHENHYDRAMINE HYDROCHLORIDE 50 MG/ML
50 INJECTION INTRAMUSCULAR; INTRAVENOUS AS NEEDED
Status: DISCONTINUED | OUTPATIENT
Start: 2024-07-10 | End: 2024-07-10 | Stop reason: HOSPADM

## 2024-07-10 RX ORDER — METHYLPREDNISOLONE SODIUM SUCCINATE 125 MG/2ML
100 INJECTION, POWDER, LYOPHILIZED, FOR SOLUTION INTRAMUSCULAR; INTRAVENOUS ONCE
OUTPATIENT
Start: 2025-01-08

## 2024-07-10 RX ORDER — SODIUM CHLORIDE 9 MG/ML
250 INJECTION, SOLUTION INTRAVENOUS ONCE
Status: COMPLETED | OUTPATIENT
Start: 2024-07-10 | End: 2024-07-10

## 2024-07-10 RX ORDER — ACETAMINOPHEN 325 MG/1
650 TABLET ORAL ONCE
OUTPATIENT
Start: 2025-01-08

## 2024-07-10 RX ORDER — FAMOTIDINE 10 MG/ML
20 INJECTION, SOLUTION INTRAVENOUS AS NEEDED
Status: DISCONTINUED | OUTPATIENT
Start: 2024-07-10 | End: 2024-07-10 | Stop reason: HOSPADM

## 2024-07-10 RX ORDER — MEPERIDINE HYDROCHLORIDE 50 MG/ML
25 INJECTION INTRAMUSCULAR; INTRAVENOUS; SUBCUTANEOUS
Status: DISCONTINUED | OUTPATIENT
Start: 2024-07-10 | End: 2024-07-10 | Stop reason: HOSPADM

## 2024-07-10 RX ORDER — DIPHENHYDRAMINE HYDROCHLORIDE 50 MG/ML
50 INJECTION INTRAMUSCULAR; INTRAVENOUS AS NEEDED
OUTPATIENT
Start: 2025-01-08

## 2024-07-10 RX ORDER — DIPHENHYDRAMINE HYDROCHLORIDE 50 MG/ML
25 INJECTION INTRAMUSCULAR; INTRAVENOUS ONCE
Status: COMPLETED | OUTPATIENT
Start: 2024-07-10 | End: 2024-07-10

## 2024-07-10 RX ORDER — DIPHENHYDRAMINE HYDROCHLORIDE 50 MG/ML
25 INJECTION INTRAMUSCULAR; INTRAVENOUS ONCE
OUTPATIENT
Start: 2025-01-08

## 2024-07-10 RX ORDER — ACETAMINOPHEN 325 MG/1
650 TABLET ORAL ONCE
Status: COMPLETED | OUTPATIENT
Start: 2024-07-10 | End: 2024-07-10

## 2024-07-10 RX ORDER — METHYLPREDNISOLONE SODIUM SUCCINATE 125 MG/2ML
100 INJECTION, POWDER, LYOPHILIZED, FOR SOLUTION INTRAMUSCULAR; INTRAVENOUS ONCE
Status: COMPLETED | OUTPATIENT
Start: 2024-07-10 | End: 2024-07-10

## 2024-07-10 RX ORDER — FAMOTIDINE 10 MG/ML
20 INJECTION, SOLUTION INTRAVENOUS AS NEEDED
OUTPATIENT
Start: 2025-01-08

## 2024-07-10 RX ADMIN — ACETAMINOPHEN 650 MG: 325 TABLET, FILM COATED ORAL at 10:01

## 2024-07-10 RX ADMIN — OCRELIZUMAB 600 MG: 300 INJECTION INTRAVENOUS at 10:10

## 2024-07-10 RX ADMIN — METHYLPREDNISOLONE SODIUM SUCCINATE 100 MG: 125 INJECTION, POWDER, FOR SOLUTION INTRAMUSCULAR; INTRAVENOUS at 10:03

## 2024-07-10 RX ADMIN — SODIUM CHLORIDE 250 ML: 9 INJECTION, SOLUTION INTRAVENOUS at 10:01

## 2024-07-10 RX ADMIN — DIPHENHYDRAMINE HYDROCHLORIDE 25 MG: 50 INJECTION, SOLUTION INTRAMUSCULAR; INTRAVENOUS at 10:01

## 2024-07-15 ENCOUNTER — OFFICE VISIT (OUTPATIENT)
Dept: NEUROLOGY | Facility: CLINIC | Age: 34
End: 2024-07-15
Payer: MEDICAID

## 2024-07-15 VITALS
HEART RATE: 80 BPM | DIASTOLIC BLOOD PRESSURE: 70 MMHG | RESPIRATION RATE: 12 BRPM | WEIGHT: 168.8 LBS | BODY MASS INDEX: 24.17 KG/M2 | HEIGHT: 70 IN | SYSTOLIC BLOOD PRESSURE: 110 MMHG

## 2024-07-15 DIAGNOSIS — H91.93 BILATERAL DEAFNESS: ICD-10-CM

## 2024-07-15 DIAGNOSIS — G35 MULTIPLE SCLEROSIS: Primary | ICD-10-CM

## 2024-07-15 DIAGNOSIS — E55.9 VITAMIN D DEFICIENCY: ICD-10-CM

## 2024-08-15 NOTE — PROGRESS NOTES
Consuelo Hall is a 34 y.o. male is here today for follow-up.  Has not gotten his MRI.    History of Present Illness  Has been feeling fairly well.  Ocrevus is the disease modifying therapy being used.       The following portions of the patient's history were reviewed and updated as appropriate: allergies, current medications, past family history, past medical history, past social history, past surgical history and problem list.    Review of Systems   Constitutional: Negative.    Eyes: Negative.    Respiratory: Negative.     Cardiovascular: Negative.    Gastrointestinal: Negative.    Musculoskeletal:  Positive for back pain.   Skin:         Alopecia    Neurological: Negative.    Psychiatric/Behavioral: Negative.           Current Outpatient Medications:     vitamin D (ERGOCALCIFEROL) 1.25 MG (40668 UT) capsule capsule, Take 1 capsule by mouth 1 (One) Time Per Week., Disp: 12 capsule, Rfl: 3     Objective   Physical Exam  Vitals and nursing note reviewed.   HENT:      Right Ear: Decreased hearing (DEAF) noted.      Left Ear: Decreased hearing (DEAF) noted.      Ears:      Comments: Deaf, uses ASL  Eyes:      General: Vision grossly intact. Gaze aligned appropriately.   Cardiovascular:      Rate and Rhythm: Normal rate.   Pulmonary:      Effort: Pulmonary effort is normal.   Neurological:      Mental Status: He is alert and oriented to person, place, and time.      Cranial Nerves: Cranial nerve deficit present.      Sensory: Sensation is intact.      Motor: Motor function is intact.      Coordination: Coordination is intact.      Gait: Gait is intact.      Deep Tendon Reflexes: Reflexes are normal and symmetric.   Psychiatric:         Attention and Perception: Attention normal.         Mood and Affect: Mood normal.         Speech: Speech normal.         Behavior: Behavior normal.         Cognition and Memory: Cognition normal.     Hepatitis B Surface Antibody (07/10/2024 08:38)    CBC & Differential  (07/10/2024 08:38)    Comprehensive Metabolic Panel (07/10/2024 08:38)      Assessment & Plan   Diagnoses and all orders for this visit:    1. Multiple sclerosis (Primary)    2. Bilateral deafness    3. Vitamin D deficiency        Advised to get his MRI and possible.  Consideration of further Ocrevus or modification will be greatly aided by this.  Reviewed labs with him.  Follow-up as scheduled.             Dictated utilizing Dragon dictation.

## 2024-11-15 ENCOUNTER — TELEPHONE (OUTPATIENT)
Dept: NEUROLOGY | Facility: CLINIC | Age: 34
End: 2024-11-15
Payer: MEDICAID

## 2024-11-15 NOTE — TELEPHONE ENCOUNTER
Received a fax from [employer], his insurance was terminated on 8-31-24.  They would like to know if he has insurance or if he needs assistance from the foundation.  Message left requesting a return call.

## 2024-12-11 ENCOUNTER — TELEPHONE (OUTPATIENT)
Dept: NEUROLOGY | Facility: CLINIC | Age: 34
End: 2024-12-11

## 2024-12-11 NOTE — TELEPHONE ENCOUNTER
Contacted [employer], explained that we received a form for patient assistance for his medication.  He needs to fill out a form for assistance with the infusion cost.

## 2024-12-15 ENCOUNTER — OFFICE VISIT (OUTPATIENT)
Age: 34
End: 2024-12-15

## 2024-12-15 VITALS
SYSTOLIC BLOOD PRESSURE: 108 MMHG | HEART RATE: 89 BPM | DIASTOLIC BLOOD PRESSURE: 70 MMHG | BODY MASS INDEX: 23.24 KG/M2 | TEMPERATURE: 98.1 F | RESPIRATION RATE: 20 BRPM | WEIGHT: 162 LBS | OXYGEN SATURATION: 99 %

## 2024-12-15 DIAGNOSIS — S39.012A STRAIN OF MUSCLE, FASCIA AND TENDON OF LOWER BACK, INITIAL ENCOUNTER: Primary | ICD-10-CM

## 2024-12-15 RX ORDER — CYCLOBENZAPRINE HCL 5 MG
5 TABLET ORAL 2 TIMES DAILY PRN
Qty: 10 TABLET | Refills: 0 | Status: SHIPPED | OUTPATIENT
Start: 2024-12-15 | End: 2024-12-25

## 2024-12-15 RX ORDER — KETOROLAC TROMETHAMINE 10 MG/1
10 TABLET, FILM COATED ORAL EVERY 6 HOURS PRN
Qty: 20 TABLET | Refills: 0 | Status: SHIPPED | OUTPATIENT
Start: 2024-12-15 | End: 2025-12-15

## 2024-12-15 RX ORDER — KETOROLAC TROMETHAMINE 30 MG/ML
60 INJECTION, SOLUTION INTRAMUSCULAR; INTRAVENOUS ONCE
Status: COMPLETED | OUTPATIENT
Start: 2024-12-15 | End: 2024-12-15

## 2024-12-15 RX ADMIN — KETOROLAC TROMETHAMINE 60 MG: 30 INJECTION, SOLUTION INTRAMUSCULAR; INTRAVENOUS at 10:56
